# Patient Record
Sex: FEMALE | Race: BLACK OR AFRICAN AMERICAN | NOT HISPANIC OR LATINO | Employment: OTHER | URBAN - METROPOLITAN AREA
[De-identification: names, ages, dates, MRNs, and addresses within clinical notes are randomized per-mention and may not be internally consistent; named-entity substitution may affect disease eponyms.]

---

## 2017-02-24 ENCOUNTER — CLINICAL SUPPORT (OUTPATIENT)
Dept: REHABILITATION | Facility: HOSPITAL | Age: 22
End: 2017-02-24
Attending: NURSE PRACTITIONER
Payer: MEDICAID

## 2017-02-24 DIAGNOSIS — M25.631 WRIST STIFFNESS, RIGHT: ICD-10-CM

## 2017-02-24 DIAGNOSIS — M25.641 STIFFNESS OF RIGHT HAND JOINT: ICD-10-CM

## 2017-02-24 DIAGNOSIS — M25.641 JOINT STIFFNESS OF HAND, RIGHT: ICD-10-CM

## 2017-02-24 DIAGNOSIS — M25.621 ELBOW STIFFNESS, RIGHT: Primary | ICD-10-CM

## 2017-02-24 DIAGNOSIS — M25.621 STIFFNESS OF RIGHT ELBOW JOINT: ICD-10-CM

## 2017-02-24 DIAGNOSIS — M25.631 STIFFNESS OF JOINT OF RIGHT FOREARM: ICD-10-CM

## 2017-02-24 DIAGNOSIS — M25.631 STIFFNESS OF RIGHT WRIST JOINT: ICD-10-CM

## 2017-02-24 PROCEDURE — 97165 OT EVAL LOW COMPLEX 30 MIN: CPT | Mod: PN

## 2017-02-24 NOTE — PLAN OF CARE
TIME RECORD    Date: 02/24/2017    Start Time:  9  Stop Time:  930    PROCEDURES:    TIMED  Procedure Time Min.    Start:  Stop:     Start:  Stop:     Start:  Stop:     Start:  Stop:          UNTIMED  Procedure Time Min.   eval Start:9  Stop:930     Start:  Stop:      Total Timed Minutes:  0  Total Timed Units:  1  Total Untimed Units:  1  Charges Billed/# of units:  1    OUTPATIENT OCCUPATIONAL THERAPY   PATIENT EVALUATION  Onset Date: years ago  Primary Diagnosis: cp with elbow, wrist, and digital flexion deformities corrected via recent  Surgery  Treatment Diagnosis: r elbow, forearm, wrist, and digital stiffness  Past Medical History:   Diagnosis Date    Anticoagulant long-term use     Blood transfusion     Diabetes mellitus     Hydrocephalus     Has 2 shunts    PDA (patent ductus arteriosus) At birth    repaired    Seizures      Precautions: universal  Prior Therapy: pre op tried OT  Medications: Santino Pompa has a current medication list which includes the following prescription(s): adalimumab, baclofen, lactulose, and levetiracetam.  Nutrition:  Normal  History of Present Illness: progressively worsening joint contractures, tried nonop tx, recently had surgery  Prior Level of Function: no functional use r ue  Social History: no concernes  DME: thermoplast splints to reinforce benefit of surgery noted  Functional Deficits Leading to Referral/Nature of Injury: concern for hygiene, permanent contractures, etc.  Patient/caregiver Therapy Goals: prevent joint contracture return    Subjective     Santino Pompa's caregiver states understanding home stretching is what OT should focus on at this point.        Objective     Posture: healed incisions noted from surgery  Range of Motion:    Prom ext/flex 0/140, sup/pro 10/90 wrist df/pf 20/60, rd/ud 20/50, thumb wnl, digits 2 thru 5 all about 2 cm to dpc with composite flex      Edema: some mild pitting edema noted r hand  ADL: dependent with all  required tasks  Treatment: told to wear splints mainly when sleeping and if arm starts to assume pre op posture, issued scar massage, issued stretches for elbow flex/ext, sup, wrist df/pf, if thru sf composite flex    Assessment       Initial Assessment (Pertinent finding, problem list and factors affecting outcome): 1-2 more visits indicated to upgrade HEP as needed to maximize joint flexibility long term  Rehab Potiential: good    Goal: caregiver will be I with HEP    Plan     Certification Period: 2-24-17 to 4-21-17  Recommended Treatment Plan: 3 times per week for 8 weeks: per md rx  Other Recommendations: home stretches should be focus of care based on surgery type, current flexibility of r ue, and prior level of function and status of r ue      Therapist: Deangelo Sanchez, EZEQUIEL/CHT    I CERTIFY THE NEED FOR THESE SERVICES FURNISHED UNDER THIS PLAN OF TREATMENT AND WHILE UNDER MY CARE    Physician's comments: ________________________________________________________________________________________________________________________________________________      Physician's Name: ___________________________________

## 2017-03-24 ENCOUNTER — CLINICAL SUPPORT (OUTPATIENT)
Dept: REHABILITATION | Facility: HOSPITAL | Age: 22
End: 2017-03-24
Attending: NURSE PRACTITIONER
Payer: MEDICAID

## 2017-03-24 DIAGNOSIS — M25.621 ELBOW STIFFNESS, RIGHT: Primary | ICD-10-CM

## 2017-03-24 DIAGNOSIS — M25.641 JOINT STIFFNESS OF HAND, RIGHT: ICD-10-CM

## 2017-03-24 DIAGNOSIS — M25.631 STIFFNESS OF RIGHT WRIST JOINT: ICD-10-CM

## 2017-03-24 DIAGNOSIS — M25.631 WRIST STIFFNESS, RIGHT: ICD-10-CM

## 2017-03-24 DIAGNOSIS — M25.621 STIFFNESS OF RIGHT ELBOW JOINT: ICD-10-CM

## 2017-03-24 DIAGNOSIS — M25.641 STIFFNESS OF RIGHT HAND JOINT: ICD-10-CM

## 2017-03-24 DIAGNOSIS — M25.631 STIFFNESS OF JOINT OF RIGHT FOREARM: ICD-10-CM

## 2017-03-24 PROCEDURE — 97110 THERAPEUTIC EXERCISES: CPT | Mod: PN

## 2017-03-24 NOTE — PROGRESS NOTES
Time in 915  Time out 945      Timed units  2 therex                              Time:915-945        S:understand erika torres OT will continue care in future  Pain: none with stretches noted    O:  Stretches as tolerated-pain free elbow thru hand all planes    Issued xs edema glove to be worn during waking hours due to mod pitting dorsal hand edema    Told pt. Family members orthotic may be leading to supination and wrist df stiffness and that continued use to offset potential stiffness resolved by surgery may no longer be therapeutic            A:stretching, edema control, scar control should be focus of care            P:per erika torres OT

## 2017-03-28 ENCOUNTER — CLINICAL SUPPORT (OUTPATIENT)
Dept: REHABILITATION | Facility: HOSPITAL | Age: 22
End: 2017-03-28
Attending: NURSE PRACTITIONER
Payer: MEDICAID

## 2017-03-28 DIAGNOSIS — M25.621 ELBOW STIFFNESS, RIGHT: Primary | ICD-10-CM

## 2017-03-28 DIAGNOSIS — M25.631 STIFFNESS OF JOINT OF RIGHT FOREARM: ICD-10-CM

## 2017-03-28 DIAGNOSIS — M25.631 WRIST STIFFNESS, RIGHT: ICD-10-CM

## 2017-03-28 PROCEDURE — 97760 ORTHOTIC MGMT&TRAING 1ST ENC: CPT | Mod: PN

## 2017-03-28 PROCEDURE — 97140 MANUAL THERAPY 1/> REGIONS: CPT | Mod: PN

## 2017-03-28 PROCEDURE — 97110 THERAPEUTIC EXERCISES: CPT | Mod: PN

## 2017-03-28 NOTE — PROGRESS NOTES
Occupational Therapy    Date:  03/28/2017  Start Time:  0910  Stop Time:  1010    TIMED  Procedure Time Min.   Manual (1) Start:  Stop: 20   Ortho Fit/Train (2) Start:  Stop: 30   TherEx (1) Start:  Stop: 10   Total Timed Minutes:  60  Total Timed Units:  4  Total Untimed Units:  0  Charges Billed/# of units:  4    Progress/Current Status    Subjective:     Patient ID: Santino Pompa is a 22 y.o. female.  Diagnosis:   1. Elbow stiffness, right     2. Stiffness of joint of right forearm     3. Wrist stiffness, right       Pain: Pt unable to rate pain.  Mom and caregiver report that stretches and scar management seems to be going well. Pt did not give indication of pain during stretches.    Objective:     Scar mobs to both scars in stretch positions. Reviewed with mom and caregiver reasoning of scar mobs and how much pressure for them to apply, direction of force at home. They v/u.  Stretching wrist/hand/digits to extension.  Resting orthosis is cracked at the wrist. Fabricated new resting orthosis with increased wrist extension compared to the old one w/ digits in neutral.  Fit appeared good with edema glove in place.    Assessment:     Pt still w/ full elbow extension and tolerating increasing in extension of resting orthosis. Santino can continue to benefit from skilled OT to further improve her wrist ROM for increased R UE function.    Patient Education/Response:     As above.    Plans and Goals:     Consider KT for scar mgt.    JACK Castillo, CLARICE  3/28/2017

## 2017-03-30 ENCOUNTER — CLINICAL SUPPORT (OUTPATIENT)
Dept: REHABILITATION | Facility: HOSPITAL | Age: 22
End: 2017-03-30
Attending: NURSE PRACTITIONER
Payer: MEDICAID

## 2017-03-30 DIAGNOSIS — M25.631 WRIST STIFFNESS, RIGHT: ICD-10-CM

## 2017-03-30 DIAGNOSIS — M25.631 STIFFNESS OF JOINT OF RIGHT FOREARM: ICD-10-CM

## 2017-03-30 DIAGNOSIS — M25.621 ELBOW STIFFNESS, RIGHT: Primary | ICD-10-CM

## 2017-03-30 PROCEDURE — 97140 MANUAL THERAPY 1/> REGIONS: CPT | Mod: PN

## 2017-03-30 PROCEDURE — 97110 THERAPEUTIC EXERCISES: CPT | Mod: PN

## 2017-03-31 ENCOUNTER — CLINICAL SUPPORT (OUTPATIENT)
Dept: REHABILITATION | Facility: HOSPITAL | Age: 22
End: 2017-03-31
Attending: NURSE PRACTITIONER
Payer: MEDICAID

## 2017-03-31 DIAGNOSIS — M25.621 ELBOW STIFFNESS, RIGHT: Primary | ICD-10-CM

## 2017-03-31 DIAGNOSIS — M25.631 STIFFNESS OF JOINT OF RIGHT FOREARM: ICD-10-CM

## 2017-03-31 DIAGNOSIS — M25.631 WRIST STIFFNESS, RIGHT: ICD-10-CM

## 2017-03-31 PROCEDURE — 97760 ORTHOTIC MGMT&TRAING 1ST ENC: CPT | Mod: PN

## 2017-03-31 PROCEDURE — 97110 THERAPEUTIC EXERCISES: CPT | Mod: PN

## 2017-04-01 NOTE — PROGRESS NOTES
Occupational Therapy    Date: 3/31/2017  Start Time:  1310  Stop Time:  1400    TIMED  Procedure Time Min.   Ortho Fit/Train (1) Start:  Stop: 20   TherEx (2) Start:  Stop: 30   Total Timed Minutes:  50  Total Timed Units:  3  Total Untimed Units:  0  Charges Billed/# of units:  3    Progress/Current Status    Subjective:     Patient ID: Santino Pompa is a 22 y.o. female.  Diagnosis:   1. Elbow stiffness, right     2. Stiffness of joint of right forearm     3. Wrist stiffness, right       Pain: 0 /10 at rest. Pt agreeable to stretching. She denies irritation from KT over scars.    Objective:     Pt presents in edema glove, resting splint and supination strap, pulling thumb MCP into hyperextension. However, even after removal of supination strap, MCP appears hyperextended in splint.  Removed supination strap.  KT appears in good condition so left in place.  Stretching to elbow extension, supination and wrist/hand/digit extension.  Wrist extension w/ digits in neutral=30*.  Attempted to adjust splint to modify thumb position and decrease hyperextension of MCP. However, due to stretching of material at wrist and difficulty with stretching thumb, cut out thumb (which is loose and demo's no tightness, increased extension of wrist, reinforced wrist with 2nd layer and added foam to thenar region.  Did not replace supination strap.    Assessment:     KT appears to be well tolerated over scarring, will continue to monitor - and pt w/ increased ROM in wrist extension since previous visit.  Difficulty with progressive adjustments to wrist splint due to inconsistent stretching of splint material.  Santino can continue to benefit from skilled OT for management of scar tissue & stretching of elbow, forearm and wrist tightness to decrease future medical costs.    Patient Education/Response:     Continue with stretching. Caregiver v/u.    Plans and Goals:     Continue stretching and scar management.    Arlnee Mak, MOT,  LOTR  3/31/2017

## 2017-04-01 NOTE — PROGRESS NOTES
"Occupational Therapy    Date: 3/30/17  Start Time:  1020  Stop Time:  1105    TIMED  Procedure Time Min.   Manual (1) Start:  Stop: 20   TherEx (2) Start:  Stop: 25   Total Timed Minutes:  45  Total Timed Units:  3  Total Untimed Units:  0  Charges Billed/# of units:  3    Progress/Current Status    Subjective:     Patient ID: Santino Pompa is a 22 y.o. female.  Diagnosis:   1. Elbow stiffness, right     2. Stiffness of joint of right forearm     3. Wrist stiffness, right       Pain: Pt unable to rate pain. She does indicate pain w/ end-range stretching.    Objective:     Stretching modulated to keep pt below pain threshold.    Applied KT to hypertrophic elbow and wrist scars. On elbow, main strip applied in "S" pattern to follow scar w/ 25-50% tension. Five kassie-cross strips applied at pitting scar areas. On wrist, main strip applied on scar w/ ~50% tension w/ 3 cross-cross sets applied evenly spaced over scar.     Stretching wrist/hand/digits to extension. Stretching forearm to supination. Stretching elbow to extension. During stretches, also perf'd min gliding of radial head to encourage increased supination. Also attempted gliding of distal ends of radius and ulna with decreased movement noted.    Caregiver brought supination splint (neoprene supination strap) that had been previously purchased. This was placed on pt with edema glove at end of session. Slight improvement in resting position (more supinated) while wearing neoprene supination strap.    Assessment:     Wrist extension w/ digits in neutral to 20*. Pt tolerated initial application of KT for scars well. She can continue to benefit from OT to maximize ROM in the R UE.    Patient Education/Response:     Ed caregiver re: wear/care of KT and resume supination strap for "2 hours at a time"    Plans and Goals:     Adjust resting splint as necessary.    JACK Castillo, FÉLIXR  3/30/17    "

## 2017-04-04 ENCOUNTER — CLINICAL SUPPORT (OUTPATIENT)
Dept: REHABILITATION | Facility: HOSPITAL | Age: 22
End: 2017-04-04
Attending: NURSE PRACTITIONER
Payer: MEDICAID

## 2017-04-04 DIAGNOSIS — Z78.9 IMPAIRED MOBILITY AND ADLS: ICD-10-CM

## 2017-04-04 DIAGNOSIS — M25.631 STIFFNESS OF JOINT OF RIGHT FOREARM: ICD-10-CM

## 2017-04-04 DIAGNOSIS — M25.621 ELBOW STIFFNESS, RIGHT: Primary | ICD-10-CM

## 2017-04-04 DIAGNOSIS — M25.631 WRIST STIFFNESS, RIGHT: ICD-10-CM

## 2017-04-04 DIAGNOSIS — Z74.09 IMPAIRED MOBILITY AND ADLS: ICD-10-CM

## 2017-04-04 PROCEDURE — 97140 MANUAL THERAPY 1/> REGIONS: CPT | Mod: PN

## 2017-04-04 PROCEDURE — 97110 THERAPEUTIC EXERCISES: CPT | Mod: PN

## 2017-04-05 ENCOUNTER — CLINICAL SUPPORT (OUTPATIENT)
Dept: REHABILITATION | Facility: HOSPITAL | Age: 22
End: 2017-04-05
Attending: NURSE PRACTITIONER
Payer: MEDICAID

## 2017-04-05 DIAGNOSIS — M25.621 ELBOW STIFFNESS, RIGHT: Primary | ICD-10-CM

## 2017-04-05 DIAGNOSIS — Z74.09 IMPAIRED MOBILITY AND ADLS: ICD-10-CM

## 2017-04-05 DIAGNOSIS — M25.631 WRIST STIFFNESS, RIGHT: ICD-10-CM

## 2017-04-05 DIAGNOSIS — M25.631 STIFFNESS OF JOINT OF RIGHT FOREARM: ICD-10-CM

## 2017-04-05 DIAGNOSIS — Z78.9 IMPAIRED MOBILITY AND ADLS: ICD-10-CM

## 2017-04-05 PROCEDURE — 97140 MANUAL THERAPY 1/> REGIONS: CPT | Mod: PN

## 2017-04-05 PROCEDURE — 97530 THERAPEUTIC ACTIVITIES: CPT | Mod: PN

## 2017-04-05 PROCEDURE — 97110 THERAPEUTIC EXERCISES: CPT | Mod: PN

## 2017-04-05 NOTE — PROGRESS NOTES
Occupational Therapy    Date:  4/4/2017  Start Time:  1010   Stop Time:  1100    TIMED  Procedure Time Min.   Manual (1) Start:  Stop: 20   TherEx (2) Start:  Stop: 30   Total Timed Minutes:  50  Total Timed Units:  3  Total Untimed Units:  0  Charges Billed/# of units:  3    Progress/Current Status    Subjective:     Patient ID: Santino Pompa is a 22 y.o. female.  Diagnosis:   1. Elbow stiffness, right     2. Stiffness of joint of right forearm     3. Wrist stiffness, right     4. Impaired mobility and ADLs       Pain: 0/10 at rest.  Pt uncomfortable when KT removed from scars. Caregiver notes that splint is migrating. She also notes that Santino is no longer able to crawl - she used to stabilize with the R flexed wrist.    Objective:     OT encouraging pt to hug with BOTH arms, which she was able to do with max encouragement at start and end of therapy.  Stretching to elbow extension, supination, wrist/hand/digit extension.  Gentle mobilization of radial head in various forearm positions. Gliding of distal radius/ulna in various aspects of forearm pronation as tolerated.  Flipped supination strap and thumb splint inside out and repositioned, adding proximal hook fastener with improved fit and no hyperextension of thumb MCP. Demo'd to caregiver how to don and photographed with her phone.  Removed KT and perf'd scar mobs. Decreased adhesions noted compared to when KT was placed.    Assessment:     Improved fit of supination strap and thumb splint after turning inside out. Softening of scars compared to when KT was placed.    Patient Education/Response:     As above.    Plans and Goals:     Get Santino on her belly and se how she is doing with crawling.    Arlene Mak, JACK, LOTR  4/4/2017

## 2017-04-05 NOTE — PROGRESS NOTES
Occupational Therapy    Date:  04/05/2017  Start Time:  0905  Stop Time:  1005    TIMED  Procedure Time Min.   Manual (1) Start:  Stop: 15   TherEx (1)  15   TherAct (2) Start:  Stop: 25   Total Timed Minutes:  55  Total Timed Units:  4  Total Untimed Units:  0  Charges Billed/# of units:  4    Progress/Current Status    Subjective:     Patient ID: Santino Pompa is a 22 y.o. female.  Diagnosis:   1. Elbow stiffness, right     2. Stiffness of joint of right forearm     3. Wrist stiffness, right     4. Impaired mobility and ADLs       Pain: 0 /10  Caregiver continues to note that pt can not support herself with the R UE to crawl as she could before tendon release, limiting her ability to participate in play and leisure activities, which are important to both her and her family.    Objective:     Hugs with 2 arms, cues for incorporating the right.  Re-demo'd to caregiver how to don supination strap. Adjusted dorsal hand strap on resting splint and added 1 layer of foam under entire palm to increase extension of wrist - less migration noted after adjustment of strap.  T/f to mat - assisted by caregiver.  Quadruped attempting to crawl - active elbow extension, but no active movement distally to stabilize. If the R UE is supported, she is able to lean on it and reach with the L UE. Had pediatric PT observe pt in crawling, who recommends a custom orthotic specifically for crawling that will stablize the wrist, but will also have a lift to position pt as she is not able to tolerate 90* wrist extension (typical positioning for crawling).  Total A for diaper change by caregiver.  Stretching elbow to extension, forearm to supination, wrist/hand/digits to extension.  KT for scars with multiple strips of directional pull to decrease adhesions at elbow and wrist, no greater than 50% tension in any strip.    Assessment:     Pt w/ slowly improving wrist extension and tolerating increased foam added to splint to keep wrist  in slightly greater extension. Santino would benefit from a wrist support to allow her to engage in crawling so that she can participate in play and leisure activities as she did prior to her tendon release.    Patient Education/Response:     Continue stretching. Reviewed KT wear/care.    Plans and Goals:     Consider orthosis specifically for crawling to support Santino's wrist so that she can stabilize in prone or quadruped.    Arlene Mak, MOT, LOTR  4/5/2017

## 2017-04-12 ENCOUNTER — CLINICAL SUPPORT (OUTPATIENT)
Dept: REHABILITATION | Facility: HOSPITAL | Age: 22
End: 2017-04-12
Attending: NURSE PRACTITIONER
Payer: MEDICAID

## 2017-04-12 DIAGNOSIS — Z74.09 IMPAIRED MOBILITY AND ADLS: ICD-10-CM

## 2017-04-12 DIAGNOSIS — M25.621 ELBOW STIFFNESS, RIGHT: Primary | ICD-10-CM

## 2017-04-12 DIAGNOSIS — Z78.9 IMPAIRED MOBILITY AND ADLS: ICD-10-CM

## 2017-04-12 DIAGNOSIS — M25.631 WRIST STIFFNESS, RIGHT: ICD-10-CM

## 2017-04-12 DIAGNOSIS — M25.631 STIFFNESS OF JOINT OF RIGHT FOREARM: ICD-10-CM

## 2017-04-12 PROCEDURE — 97530 THERAPEUTIC ACTIVITIES: CPT | Mod: PN

## 2017-04-12 PROCEDURE — 97140 MANUAL THERAPY 1/> REGIONS: CPT | Mod: PN

## 2017-04-12 PROCEDURE — 97110 THERAPEUTIC EXERCISES: CPT | Mod: PN

## 2017-04-12 NOTE — PROGRESS NOTES
"Occupational Therapy  Date:  04/12/2017    Start Time:  1000  Stop Time:  1100    TIMED  Procedure Time Min.   TherAct (1) Start:  Stop: 20   TherEx (2) Start:  Stop: 25   Manual (1) Start:  Stop: 15   Total Timed Minutes:  60  Total Timed Units:  4  Total Untimed Units:  0  Charges Billed/# of units:  4    Progress/Current Status    Subjective:     Patient ID: Santino Pompa is a 22 y.o. female.  Diagnosis:   1. Elbow stiffness, right     2. Stiffness of joint of right forearm     3. Wrist stiffness, right     4. Impaired mobility and ADLs       Pain: 0 /10  Caregiver noted that resting splint is not so much "slipping" or migrating as much as Santino is pulling it off when she gets angry.    Objective:       Santino arrives with splint, edema glove and supination strap, clearly not in correct position. Clarified with caregiver as noted in subjective.  Hugs with max encouragement for use of both arms.  Crawling with resting splint, edema glove and supination strap; crawling with edema glove and supination strap only; crawling with no support; crawling with OTC wrist cock-up brace; crawling with OTC wrist cock-up brace and supination strap. Santino used the R UE the most, demo'd the most weight bearing during use of the resting splint. With no brace, she was unable to stabilize. With the wrist cock-up, she appeared uncomfortable, as her MCP's were hyperextended and she appeared to be avoiding weightbearing in that position.  Stretching elbow to extension, forearm to supination and wrist/hand/digits to extension.  Gentle joint manipulations and soft tissue manipulations to encouraged increased supination and wrist extension - including movement of radial head, gliding of distal ends of radius and ulna and gliding of carpal joints.  Fascial tightness noted at volar wrist. KT to volar wrist for space correction  - about 30% tension over scar and about 30% tension across distal wrist to decrease tension in tissues at " wrist to allow for increased extension. Pt placed in max possible wrist extension during application of KT.  Issued dycem for resting splint and ed caregiver to use tape to apply dycem to heel of palm when pt is crawling to prevent slippage. She v/u.    Assessment:     Pt actually most effective crawling with resting hand splint with wrist positioned in about 30* extension and digits in neutral. Recommended to caregiver that Ambry be allowed to crawl in this splint and gave them some dycem to prevent slippage, for now. Will consider fabrication of a sturdier device that pt can crawl in that will hold up better to regular wear/tear of weightbearing.    Patient Education/Response:     As above.    Plans and Goals:     Consider sturdier orthosis specifically for crawling. Continue stretching.    Arlene Mak, JACK, LOTR  4/12/2017

## 2017-04-19 ENCOUNTER — CLINICAL SUPPORT (OUTPATIENT)
Dept: REHABILITATION | Facility: HOSPITAL | Age: 22
End: 2017-04-19
Attending: NURSE PRACTITIONER
Payer: MEDICAID

## 2017-04-19 DIAGNOSIS — Z78.9 IMPAIRED MOBILITY AND ADLS: ICD-10-CM

## 2017-04-19 DIAGNOSIS — M25.631 STIFFNESS OF JOINT OF RIGHT FOREARM: ICD-10-CM

## 2017-04-19 DIAGNOSIS — M25.621 ELBOW STIFFNESS, RIGHT: Primary | ICD-10-CM

## 2017-04-19 DIAGNOSIS — Z74.09 IMPAIRED MOBILITY AND ADLS: ICD-10-CM

## 2017-04-19 DIAGNOSIS — R68.89 DIFFICULTY PARTICIPATING IN LEISURE ACTIVITIES: ICD-10-CM

## 2017-04-19 DIAGNOSIS — M25.631 WRIST STIFFNESS, RIGHT: ICD-10-CM

## 2017-04-19 PROCEDURE — 97760 ORTHOTIC MGMT&TRAING 1ST ENC: CPT | Mod: PN

## 2017-04-19 PROCEDURE — 97110 THERAPEUTIC EXERCISES: CPT | Mod: PN

## 2017-04-20 ENCOUNTER — CLINICAL SUPPORT (OUTPATIENT)
Dept: REHABILITATION | Facility: HOSPITAL | Age: 22
End: 2017-04-20
Attending: NURSE PRACTITIONER
Payer: MEDICAID

## 2017-04-20 DIAGNOSIS — R68.89 DIFFICULTY PARTICIPATING IN LEISURE ACTIVITIES: ICD-10-CM

## 2017-04-20 DIAGNOSIS — M25.631 STIFFNESS OF JOINT OF RIGHT FOREARM: ICD-10-CM

## 2017-04-20 DIAGNOSIS — Z78.9 IMPAIRED MOBILITY AND ADLS: ICD-10-CM

## 2017-04-20 DIAGNOSIS — Z74.09 IMPAIRED MOBILITY AND ADLS: ICD-10-CM

## 2017-04-20 DIAGNOSIS — M25.621 ELBOW STIFFNESS, RIGHT: Primary | ICD-10-CM

## 2017-04-20 DIAGNOSIS — M25.641 JOINT STIFFNESS OF HAND, RIGHT: ICD-10-CM

## 2017-04-20 DIAGNOSIS — M25.631 WRIST STIFFNESS, RIGHT: ICD-10-CM

## 2017-04-20 PROCEDURE — 97110 THERAPEUTIC EXERCISES: CPT | Mod: PN

## 2017-04-20 PROCEDURE — 97760 ORTHOTIC MGMT&TRAING 1ST ENC: CPT | Mod: PN

## 2017-04-20 NOTE — PROGRESS NOTES
Occupational Therapy    Date: 4/19/2017  Start Time:  1005  Stop Time:  1105    TIMED  Procedure Time Min.   Ortho Fit/Train (2) Start:  Stop: 25   TherEx (2) Start:  Stop: 35   Total Timed Minutes:  60  Total Timed Units:  4  Total Untimed Units:  0  Charges Billed/# of units:  4    OCCUPATIONAL THERAPY UPDATED PLAN OF TREATMENT    Patient name: Santino Pompa  Onset Date:  February 2017  SOC Date:  2/24/2017  Primary Diagnosis:  CP  Treatment Diagnosis:    Encounter Diagnoses   Name Primary?    Elbow stiffness, right Yes    Stiffness of joint of right forearm     Wrist stiffness, right     Impaired mobility and ADLs     Difficulty participating in leisure activities      Certification Period:  4/21/2017 to 6/16/2017  Precautions:  universal  Visits from SOC:  9  Functional Level Prior to SOC:  Santino requires max to total assist for most activities (>90% of activities). After starting OT (around visit 5), her caregiver notified OT that Santino has not been able to crawl since the tendon release because she has no control of the R arm. Prior to her tendon release, crawling on the floor was an enjoyed activity where Santino could play ball and interact with her family in a playful way.    Updated Assessment:  Santino remains max to total A for most tasks. She has active shoulder flexion, abduction, adduction, horizontal abduction, and horizontal adduction in the R UE. When cued, she will incorporate the R UE when hugging another person. She also has active elbow extension. Santino does not appear to have purposeful elbow flexion at this time. Nor does she appear to have purposeful movement in the forearm, wrist or hand.    We have tried crawling with and without various supports. With no support, she is unable to bear weight through the R UE as she is unable to stabilize the wrist and forearm. With an OTC wrist cock-up, she is slightly more independent, but resists bearing weight as her MCP's are placed in a  position of forced hyperextension which appears uncomfortable for her. With her resting hand splint, she she bears more weight, but the design of the splint is not sufficient to hold up to repeated weight bearing and repeated use of the splint in this fashion would be detrimental to it.    PROM: elbow ext=-10, flex=122   Forearm pronation >90, supination=30* from full pronation (which is an improvement, but still a significant deficit in movement)   Wrist extension=45, flexion=60   RD=24, UD=50   Thumb movement is grossly WFL   Digits 2-5 composite flexion all get to within 1 cm of dpc    There is continued mild edema in the R UE despite daily use of an appropriately sized glove.  Scars at the elbow and wrist are well healed though min adhesions remain with some keloid/ raised scarring.    Treatment: We started fabrication of a custom orthosis which Santino can use soley for crawling. It positions her at about 45* wrist extension and is designed to prevent over-pronation of the forearm, with reinforcement at the wrist to help the orthosis maintain it's angle with repeated use. It is also designed to allow contact between her finger tips/ thumb and the floor for increased sensory feedback. It will be well padded and allow for concurrent use of the supination strap and edema glove.    Previous Short Term Goals Status: caregiver will be I with HEP (MET)    New Short Term Goals (4 weeks - 4/21/2017- 5/19/2017)  1) Family/caregiver to be mod I for strengthening HEP (for triceps - and any other muscles as appropriate pending progress).  2) continued independence with HEP for ROM and scar management.    New Long Term Goal Status: (8 weeks - 4/21/2017-6/17/2017)  1) While wearing orthosis for crawling, Santino will be able to bear full weight through the R UE for crawling and reaching with the L UE in quadruped to increase engagement in leisure activities for increased quality of life and engagement in prior roles.  2) Santino's  PROM in supination will improve to at least neutral for improvement movement in the R UE.    Reasons for Recertification of Therapy:  Santino's range of motion has improved since starting OT though she continue to have scar adhesions and significant limitations in supination. Most importatly, she still can not crawl, which affects Santino's quality of life and her ability to participate in previously enjoyed leisure activities. She can benefit from continued occupational therapy for orthotic fabrication for a device that will support her wrist and forearm specifically for crawling, final adjustments of her resting orthoses, continued manual therapy and stretching to further improve her ROM and strengthening to allow Santino to participate in the leisure activities she enjoyed prior to her tendon release.     Recommended Treatment Plan: Therapeutic Exercise, Functional Activities, Patient/family Education, Home Exercise Program, orthotic fit/train     Therapist's Name: JACK Castillo LOTR        Date: 4/19/2017      I CERTIFY THE NEED FOR THESE SERVICES FURNISHED UNDER THIS PLAN OF TREATMENT AND WHILE UNDER MY CARE    Physician's comments: ________________________________________________________________________________________________________________________________________________      Physician's Name (print): ___________________________________    Physician's Signature: _______________________________________________    Date: ________________________

## 2017-04-20 NOTE — PROGRESS NOTES
Occupational Therapy    Date:  04/20/2017  Start Time:  1015  Stop Time:  1100    TIMED  Procedure Time Min.   Ortho Fit/Train (2) Start:  Stop: 35   TherEx (1) Start:  Stop: 10   Total Timed Minutes:  45  Total Timed Units:  3  Total Untimed Units:  0  Charges Billed/# of units:  3    Progress/Current Status    Subjective:     Patient ID: Santino Pompa is a 22 y.o. female.  Diagnosis:   1. Elbow stiffness, right     2. Stiffness of joint of right forearm     3. Wrist stiffness, right     4. Impaired mobility and ADLs     5. Difficulty participating in leisure activities     6. Joint stiffness of hand, right       Pain: 0 /10  Caregiver/mom report that Santino did not try crawling since previous visit.    Objective:     Completed fabrication of custom orthosis for crawling. Added padding at palm and moleskin over inside of orthosis to ensure no irritation to skin.  Added strapping to forearm with d-rings for adequate ability to tighten straps and demo'd to mom and caregiver how to tighten straps without over tightening.  Added strap over hand. Issued dycem and ed mom to [employer] glue one piece to bottom of orthosis to prevent slippage.    Stretching to wrist/hand/digits to extension.    Santino demo'd ability to crawl in orthosis, though with min weight bearing. When she was encouraged to increase WB through the R UE and reach with the L UE, she shifted her weight posteriorly to her thighs.     Assessment:     Orthosis fit appears good. Santino stays in pronation during crawling (as opposed to over pronating). She is able to bear some weight and activate her triceps in quadruped with the orthosis on her hand. She can continue to benefit from skilled OT to improve R UE ROM and function for return to prior level of function and return to leisure activities like crawling with orthotic assist.    Patient Education/Response:     Work on crawling with orthosis. Continue stretching and scar management HEP. Mom and caregiver  v/u.    Plans and Goals:     Continue strengthening triceps and working on crawling.    Arlene Mak, MOT, LOTR  4/20/2017

## 2017-05-09 ENCOUNTER — CLINICAL SUPPORT (OUTPATIENT)
Dept: REHABILITATION | Facility: HOSPITAL | Age: 22
End: 2017-05-09
Payer: MEDICAID

## 2017-05-09 DIAGNOSIS — R68.89 DIFFICULTY PARTICIPATING IN LEISURE ACTIVITIES: ICD-10-CM

## 2017-05-09 DIAGNOSIS — Z78.9 IMPAIRED MOBILITY AND ADLS: ICD-10-CM

## 2017-05-09 DIAGNOSIS — Z74.09 IMPAIRED MOBILITY AND ADLS: ICD-10-CM

## 2017-05-09 DIAGNOSIS — M25.631 STIFFNESS OF JOINT OF RIGHT FOREARM: ICD-10-CM

## 2017-05-09 DIAGNOSIS — M25.621 ELBOW STIFFNESS, RIGHT: Primary | ICD-10-CM

## 2017-05-09 DIAGNOSIS — M25.631 WRIST STIFFNESS, RIGHT: ICD-10-CM

## 2017-05-09 PROCEDURE — 97140 MANUAL THERAPY 1/> REGIONS: CPT | Mod: PN

## 2017-05-09 PROCEDURE — 97110 THERAPEUTIC EXERCISES: CPT | Mod: PN

## 2017-05-09 PROCEDURE — 97530 THERAPEUTIC ACTIVITIES: CPT | Mod: PN

## 2017-05-11 ENCOUNTER — CLINICAL SUPPORT (OUTPATIENT)
Dept: REHABILITATION | Facility: HOSPITAL | Age: 22
End: 2017-05-11
Payer: MEDICAID

## 2017-05-11 DIAGNOSIS — R68.89 DIFFICULTY PARTICIPATING IN LEISURE ACTIVITIES: ICD-10-CM

## 2017-05-11 DIAGNOSIS — Z74.09 IMPAIRED MOBILITY AND ADLS: ICD-10-CM

## 2017-05-11 DIAGNOSIS — Z78.9 IMPAIRED MOBILITY AND ADLS: ICD-10-CM

## 2017-05-11 DIAGNOSIS — M25.631 WRIST STIFFNESS, RIGHT: ICD-10-CM

## 2017-05-11 DIAGNOSIS — M25.631 STIFFNESS OF JOINT OF RIGHT FOREARM: ICD-10-CM

## 2017-05-11 DIAGNOSIS — M25.621 ELBOW STIFFNESS, RIGHT: Primary | ICD-10-CM

## 2017-05-11 PROCEDURE — 97140 MANUAL THERAPY 1/> REGIONS: CPT | Mod: PN

## 2017-05-11 PROCEDURE — 97530 THERAPEUTIC ACTIVITIES: CPT | Mod: PN

## 2017-05-11 PROCEDURE — 97110 THERAPEUTIC EXERCISES: CPT | Mod: PN

## 2017-05-11 NOTE — PROGRESS NOTES
Occupational therapy    Date:  05/11/2017  Start Time:  0910  Stop Time:  1005    TIMED  Procedure Time Min.   TherEx (2) Start:  Stop: 25   TherAct (1) Start:  Stop: 10   Manual (1) Start:  Stop: 20   Total Timed Minutes:  55  Total Timed Units:  4  Total Untimed Units:  0  Charges Billed/# of units:  4    Progress/Current Status    Subjective:     Patient ID: Santino Pompa is a 22 y.o. female.  Diagnosis:   1. Elbow stiffness, right     2. Stiffness of joint of right forearm     3. Wrist stiffness, right     4. Impaired mobility and ADLs     5. Difficulty participating in leisure activities       Pain: 0 /10  Mom expressed concern that Ronnys fingers are pulling back toward extension, especially at the tips.    Objective:     Pt arrives with mom and caregiver today. Edema glove and supination strap in place, splint migrating distally.  Mod A for SPT to the mat. Sitting at EOM, perf'd mobilization of carpals, metacarpals, and forearm to improve wrist extension, supination, and position of thumb CMC in preparation for WB. PROM following mobilization.   PROM of fingers into composite fist - as pt withdrawing from this, combined the movement of her withdrawal to make a rotational movement that allowed for repeated ROM that pt actually engaged in. Ed mom and caregiver that they should attempt complete digit ROM with this technique at home and may be more successful. They v/u.  WB through the R UE w/ carpal massage to improve positioning of wrist in WB position during L UE contralateral reaching engage in game. Pt unable to strategize during play, but did enjoy interaction with the game. Added padding to thenar eminence of splint to reduce over-pronation and pull CMC toward more correct postioning. Repeated ROM of digits. Replaced edema glove, supination strap and resting splint at end of session.    Assessment:     Improved tolerance for WB through the R UE compared to earlier this week. Improved tolerance for  ROM to digit flexion as well with rotational movements where pt is engaged in whole arm movement during digit ROM. Ambry can continue to benefit from skilled OT to increase ROM and WB tolerance throughout the R UE for increased ability to participate in desired leisure activities.    Patient Education/Response:     As noted above.    Plans and Goals:     Attempt WB in quadruped for game or floor puzzle. Continue manual and ROM. Progress splint as tolerated to improve supination and thumb CMC postioning.    Arlene Mak, MOT, LOTR  5/11/2017

## 2017-05-16 ENCOUNTER — CLINICAL SUPPORT (OUTPATIENT)
Dept: REHABILITATION | Facility: HOSPITAL | Age: 22
End: 2017-05-16
Payer: MEDICAID

## 2017-05-16 DIAGNOSIS — Z74.09 IMPAIRED MOBILITY AND ADLS: ICD-10-CM

## 2017-05-16 DIAGNOSIS — M25.641 JOINT STIFFNESS OF HAND, RIGHT: ICD-10-CM

## 2017-05-16 DIAGNOSIS — Z78.9 IMPAIRED MOBILITY AND ADLS: ICD-10-CM

## 2017-05-16 DIAGNOSIS — M25.631 STIFFNESS OF JOINT OF RIGHT FOREARM: ICD-10-CM

## 2017-05-16 DIAGNOSIS — M25.631 WRIST STIFFNESS, RIGHT: ICD-10-CM

## 2017-05-16 DIAGNOSIS — R68.89 DIFFICULTY PARTICIPATING IN LEISURE ACTIVITIES: ICD-10-CM

## 2017-05-16 DIAGNOSIS — M25.621 ELBOW STIFFNESS, RIGHT: Primary | ICD-10-CM

## 2017-05-16 PROCEDURE — 97110 THERAPEUTIC EXERCISES: CPT | Mod: PN

## 2017-05-16 PROCEDURE — 97530 THERAPEUTIC ACTIVITIES: CPT | Mod: PN

## 2017-05-16 PROCEDURE — 97140 MANUAL THERAPY 1/> REGIONS: CPT | Mod: PN

## 2017-05-16 NOTE — PROGRESS NOTES
"Occupational Therapy    Date:  05/16/2017  Start Time: 10:05  Stop Time:  11:03    TIMED  Procedure Time Min.   Manual (1) Start:  Stop: 20   Therapeutic activity (1)  Start:  Stop: 10   TherEx (2) Start:  Stop: 28   Total Timed Minutes:  58  Total Timed Units:  4  Total Untimed Units: 0  Charges Billed/# of units:  4    Progress/Current Status    Subjective:     Patient ID: Santino Pompa is a 22 y.o. female.  Diagnosis:   1. Elbow stiffness, right     2. Stiffness of joint of right forearm     3. Wrist stiffness, right     4. Impaired mobility and ADLs     5. Difficulty participating in leisure activities     6. Joint stiffness of hand, right       Pain: 0 /10  Pt unable to verbalize pain level. Based on FLACC, 0/10 at tx start/ during rest. Up to 4/10 during PROM of hand and forearm. 0/10 at end of tx.  Pt was in good spirits on arrival. Caretaker noted that she had increased the functional use of R UE w/ orthosis on and the orthosis showed signs of wear.     Objective:     Pt arrived w/ R UEresting hand orthosis, edema glove and supination strap. Caregiver brought the R UE mobility assisting orthosis as well. Mod A t/f to mat w/ max encouragement for "up tall" and progressing feet during t/f. Manual tx for mobilization of PIPs, DIPs, MPs, CMCs, thumb, carpals, radius and ulna to improve ROM and decrease edema.  Facilitated mobilizations through functional reaching activities to engage pt's active participation. PROM to digit flexion for intrinsic (+), intrinsic (-), and fist, as well as thumb composite flexion and thumb IP flexion.    R UE WB during L UE contralateral reaching w/ min support to over pronation of forearm in prep for crawling.    Mod A for sit<>quadruped. Quadruped with mobility orthosis for static WB while engaging in puzzle activity - pt required facilitation for maintaining center of gravity over arms and not resting back on hips while reaching with the L UE. Crawling backwards/forwards " w/ assist to maintain pronated position/ prevent over pronation. As above, facilitation of positioning center of gravity to promoted WB through R UE.     Added 1 layer of foam to both resting splint and mobility orthosis to thenar pad region to improve thumb CMC position/ increase supination in resting position.    R UE was returned to the edema glove, supination strap and resting hand splint at the end of the session.    Assessment:     Pt w/ slight improvement in tolerance for WB with mobility orthosis. Ambry can continue to benefit from skilled OT to increase ROM and WB tolerance throughout the R UE for increased ability to participate in desired leisure activities.    Patient Education/Response:     Ed caregiver re: PROM for thumb and fingers, handout given. She v/u.    Plans and Goals:     Have caregiver/mom return demo of PROM to thumb and fingers to ensure correct technique.   Pt needs to continue with positioning and stretching according to HEP.  Pt needs to continue to wear mobility orthosis when crawling at home.   Consider a AFO quality orthosis to support hand at time of d/c.     JACK Castillo, CLARICE  5/16/2017

## 2017-05-18 ENCOUNTER — CLINICAL SUPPORT (OUTPATIENT)
Dept: REHABILITATION | Facility: HOSPITAL | Age: 22
End: 2017-05-18
Payer: MEDICAID

## 2017-05-18 DIAGNOSIS — R68.89 DIFFICULTY PARTICIPATING IN LEISURE ACTIVITIES: ICD-10-CM

## 2017-05-18 DIAGNOSIS — Z74.09 IMPAIRED MOBILITY AND ADLS: ICD-10-CM

## 2017-05-18 DIAGNOSIS — M25.621 ELBOW STIFFNESS, RIGHT: Primary | ICD-10-CM

## 2017-05-18 DIAGNOSIS — M25.631 WRIST STIFFNESS, RIGHT: ICD-10-CM

## 2017-05-18 DIAGNOSIS — Z78.9 IMPAIRED MOBILITY AND ADLS: ICD-10-CM

## 2017-05-18 DIAGNOSIS — M25.641 JOINT STIFFNESS OF HAND, RIGHT: ICD-10-CM

## 2017-05-18 DIAGNOSIS — M25.631 STIFFNESS OF JOINT OF RIGHT FOREARM: ICD-10-CM

## 2017-05-18 PROCEDURE — 97140 MANUAL THERAPY 1/> REGIONS: CPT | Mod: PN

## 2017-05-18 PROCEDURE — 97530 THERAPEUTIC ACTIVITIES: CPT | Mod: PN

## 2017-05-18 PROCEDURE — 97110 THERAPEUTIC EXERCISES: CPT | Mod: PN

## 2017-05-18 NOTE — PROGRESS NOTES
"Occupational Therapy Treatment Note     Date:  05/18/2017    Start Time:  0905  Stop Time:  1005    PROCEDURES:    TIMED  Procedure Time Min.   TherEx (2)  25   Manual (1) Start:  Stop: 15   TherAct (1) Start:  Stop: 20     Total Timed Minutes:  60  Total Timed Units:  4  Total Untimed Units:  0  Charges Billed/# of units:  4      Progress/Current Status    Subjective:     Patient ID: Santino Pompa is a 22 y.o. female.  Diagnosis:   1. Elbow stiffness, right     2. Stiffness of joint of right forearm     3. Wrist stiffness, right     4. Impaired mobility and ADLs     5. Difficulty participating in leisure activities     6. Joint stiffness of hand, right       Pain: 0 /10  Pt seemed at good spirits on arrival.  Caregiver reported increased edema in the R hand when iRcharry woke this morning. When OT asked pt if wrist/forearm mobilization felt painful or weird, pt reported "weird". During wrist supination pt displayed a 3/10 on FLACC scale.      Objective:     Pt had resting hand splint, supination strap, and edema glove.  Caretaker brought mobility orthosis.   Santino had noticeable increase in edema in the R hand at the start of session vs the beginning of prior session. Edema did improve over course of session.  SPT w/c <> edge of mat Mod A   AROM for R sh flex x5 w/ max encouragement  Attempted elbow flex to touch head - she was able to flex shoulder, but unable to bend elbow 5 reps  PROM 2x10 for composite digit flexion, intrinsic (+), intrinsic (-), thumb IP flex, thumb composite flex/ext  Mobilization of wrist and forearm  WB through palm of R UE w/ support while seated during functional activity with L UE crossing midline. During WB with R UE when crossing midline there was a popping at the head of the radius that decreased when supported. This has been noted by family on previous visits and mentioned to the surgeon.  WB through palm of R UE  in quadruped w/ mobility orthosis, support at R forearm and " proximal to elbow, as well as support to hips during functional task. When attempting WB during L UE function where Ambry was required to lift the L UE, she tried to stabilize by leaning on the L elbow. When that was discouraged by OT, she extended hips and laid in prone over OT's thighs.  SPT edge of mat <> w/c Mod A  Pt escorted out by caretaker in w/c with resting hand splint, supination strap, and edema glove in place.     Assessment:     Pt presented with continued over pronation.  Pt reported that R wrist felt weird when mobilizing.  Pt does not appear to have any active elbow flexion in the R UE. Pt had difficulty coordinating quadruped positioning while WB through R UE and reaching with opposite side.  Therapists provided support at forearm and hip w/ max assist. Pt can continue to benefit from skilled OT services to retrain/rehabilitated R UE in order to engage with valued activities.     Patient Education/Response:     Continue HEP    Plans and Goals:     Continue to stretch and mobilize R UE to allow engagement in leisure activities.      Arlene Mak, JACK, LOTR  5/18/2017

## 2017-05-22 ENCOUNTER — CLINICAL SUPPORT (OUTPATIENT)
Dept: REHABILITATION | Facility: HOSPITAL | Age: 22
End: 2017-05-22
Payer: MEDICAID

## 2017-05-22 DIAGNOSIS — Z74.09 IMPAIRED MOBILITY AND ADLS: ICD-10-CM

## 2017-05-22 DIAGNOSIS — M25.621 ELBOW STIFFNESS, RIGHT: Primary | ICD-10-CM

## 2017-05-22 DIAGNOSIS — M25.641 JOINT STIFFNESS OF HAND, RIGHT: ICD-10-CM

## 2017-05-22 DIAGNOSIS — R68.89 DIFFICULTY PARTICIPATING IN LEISURE ACTIVITIES: ICD-10-CM

## 2017-05-22 DIAGNOSIS — Z78.9 IMPAIRED MOBILITY AND ADLS: ICD-10-CM

## 2017-05-22 DIAGNOSIS — M25.631 STIFFNESS OF JOINT OF RIGHT FOREARM: ICD-10-CM

## 2017-05-22 DIAGNOSIS — M25.631 WRIST STIFFNESS, RIGHT: ICD-10-CM

## 2017-05-22 PROCEDURE — 97530 THERAPEUTIC ACTIVITIES: CPT | Mod: PN

## 2017-05-22 PROCEDURE — 97140 MANUAL THERAPY 1/> REGIONS: CPT | Mod: PN

## 2017-05-22 PROCEDURE — 97110 THERAPEUTIC EXERCISES: CPT | Mod: PN

## 2017-05-22 NOTE — PROGRESS NOTES
Occupational Therapy    Date:  05/22/2017  Start Time:  0905  Stop Time:  1000    TIMED  Procedure Time Min.   TherEx (2) Start:  Stop: 25   Manual (1) Start:  Stop: 20   TherAct (1) Start:  Stop: 10   Total Timed Minutes:  55  Total Timed Units:  4  Total Untimed Units:  0  Charges Billed/# of units:  4    Progress/Current Status    Subjective:     Patient ID: Santino Pompa is a 22 y.o. female.  Diagnosis:   1. Elbow stiffness, right     2. Stiffness of joint of right forearm     3. Wrist stiffness, right     4. Impaired mobility and ADLs     5. Difficulty participating in leisure activities     6. Joint stiffness of hand, right       Pain: 0 /10 at rest. Caregiver commented that she thinks Santino did not have a very good weekend. Santino without complaint.    Objective:     Mod A w/c<>mat SPT.  Sitting EOM, manual tx to wrist, forearm, hand and fingers.  PROM to finger flexion, individual (joint and finger) and composite (joint and finger)  Stretching to supination. Stretching to wrist extension.  KT for scar mobilization at elbow w/ cross-directional pulling, ~35% tension. KT for scar mobilization and space correction at wrist with 2 i-strips in t-pattern, ~35% tension.  WB through the R palm for L UE contralateral reaching as tolerated. Attempted WB through R forearm w/ stretching forearm to neutral supination but pt pulling away.  Repeat PROM to digit flexion  Added foam to thenar region of resting orthosis to improve supination in resting position.    Assessment:     Pt w/o mobilization orthosis today, so crawling deferred. Was able to work on WB at EOM though pt remains resistant to WB overall.    Patient Education/Response:     Encouraged pt and caregiver to continue HEP for stretching, ROM and crawling. Caregiver v/u.    Plans and Goals:     Continue mobs, ROM, WB, crawling    JACK Castillo, LOTR  5/22/2017

## 2017-05-25 ENCOUNTER — CLINICAL SUPPORT (OUTPATIENT)
Dept: REHABILITATION | Facility: HOSPITAL | Age: 22
End: 2017-05-25
Payer: MEDICAID

## 2017-05-25 DIAGNOSIS — Z74.09 IMPAIRED MOBILITY AND ADLS: ICD-10-CM

## 2017-05-25 DIAGNOSIS — M25.631 STIFFNESS OF JOINT OF RIGHT FOREARM: ICD-10-CM

## 2017-05-25 DIAGNOSIS — R68.89 DIFFICULTY PARTICIPATING IN LEISURE ACTIVITIES: ICD-10-CM

## 2017-05-25 DIAGNOSIS — M25.641 JOINT STIFFNESS OF HAND, RIGHT: ICD-10-CM

## 2017-05-25 DIAGNOSIS — M25.621 ELBOW STIFFNESS, RIGHT: Primary | ICD-10-CM

## 2017-05-25 DIAGNOSIS — M25.631 WRIST STIFFNESS, RIGHT: ICD-10-CM

## 2017-05-25 DIAGNOSIS — Z78.9 IMPAIRED MOBILITY AND ADLS: ICD-10-CM

## 2017-05-25 PROCEDURE — 97110 THERAPEUTIC EXERCISES: CPT | Mod: PN

## 2017-05-25 PROCEDURE — 97530 THERAPEUTIC ACTIVITIES: CPT | Mod: PN

## 2017-05-25 NOTE — PROGRESS NOTES
"Occupational Therapy Note     Date:  05/25/2017    Start Time:  0912   Stop Time:  1000    PROCEDURES:    TIMED  Procedure Time Min.   TherAct (1) Start:  Stop: 17   Manual (0)  7   TherEx (2) Start:  Stop: 24   Total Timed Minutes:  48  Total Timed Units: 3  Total Untimed Units:  0  Charges Billed/# of units:  3    Progress/Current Status    Subjective:     Patient ID: Santino Pompa is a 22 y.o. female.  Diagnosis:   1. Elbow stiffness, right     2. Stiffness of joint of right forearm     3. Wrist stiffness, right     4. Impaired mobility and ADLs     5. Difficulty participating in leisure activities     6. Joint stiffness of hand, right       Pain: 0 /10  Pt. Tends to with draw with a FLACC pain score of 3/10 during wrist mobilizations, particularly w/ composite finger and wrist flexion.     Objective:     Pt was met in the lobby w/ brother ([place]) and wheeled to the OT gym.  Pt had resting hand splint, supination strap, and edema glove on at start of session.  SPT t/f Max A w/c<>mat.  Sitting EOM, manual tx to wrist, forearm, hand and fingers to decrease edema and increase mobility.  PROM to finger flexion, individual (joint and finger) and composite (joint and finger). As tolerated, combined finger flexion with wrist flexion.  Stretching to supination. Stretching to wrist extension.  Facilitation of "up tall" posture at EOM.  Supported WB through R palm during L UE contralateral reaching.   Mobility assistance splint donned, supported WB through R UE during L UE contralateral reaching to play [employer] on an adjustable platform. She required mod-max support to triceps despite active demonstrating active movement in triceps today and during prior sessions. Pt unable to apply strategy during game, but was able to reach to top of game board for dropping pieces into board with only occasional cues.  Mobility assistance splint was removed edema glove, supination strap, and resting hand splint were " replaced.     Assessment:     Max A t/f today, more difficult  than the previous sessions - pt w/ decreased initiation during t/f.  OT inquired to brother if pt had quality sleep, but he was unable to answer. Santino can continue to benefit from skilled OT services to improve passive mobility in the wrist, hand and forearm and to improve ability to bear weight through the R UE to allow engagement in crawling for return to valued roles and routines.    Patient Education/Response:     Pt's brother was educated on composite PROM wrist/finger flexion.    Plans and Goals:     Continue with stretching, ROM, mobs and crawling.     Francisco Lin, JACKS  5/25/2017    I certify that I was present in the room directing the student in service delivery and guiding them using my skilled judgment. As the co-signing therapist I have reviewed the students documentation and am responsible for the treatment, assessment, and plan.   Arlene Mak, JACK, LOTR  5/25/2017

## 2017-05-30 ENCOUNTER — CLINICAL SUPPORT (OUTPATIENT)
Dept: REHABILITATION | Facility: HOSPITAL | Age: 22
End: 2017-05-30
Payer: MEDICAID

## 2017-05-30 DIAGNOSIS — Z78.9 IMPAIRED MOBILITY AND ADLS: ICD-10-CM

## 2017-05-30 DIAGNOSIS — M25.641 JOINT STIFFNESS OF HAND, RIGHT: ICD-10-CM

## 2017-05-30 DIAGNOSIS — Z74.09 IMPAIRED MOBILITY AND ADLS: ICD-10-CM

## 2017-05-30 DIAGNOSIS — M25.621 ELBOW STIFFNESS, RIGHT: Primary | ICD-10-CM

## 2017-05-30 DIAGNOSIS — M25.631 STIFFNESS OF JOINT OF RIGHT FOREARM: ICD-10-CM

## 2017-05-30 DIAGNOSIS — R68.89 DIFFICULTY PARTICIPATING IN LEISURE ACTIVITIES: ICD-10-CM

## 2017-05-30 DIAGNOSIS — M25.631 WRIST STIFFNESS, RIGHT: ICD-10-CM

## 2017-05-30 PROCEDURE — 97140 MANUAL THERAPY 1/> REGIONS: CPT | Mod: PN

## 2017-05-30 PROCEDURE — 97530 THERAPEUTIC ACTIVITIES: CPT | Mod: PN

## 2017-05-30 PROCEDURE — 97110 THERAPEUTIC EXERCISES: CPT | Mod: PN

## 2017-05-30 NOTE — PROGRESS NOTES
Occupational Therapy    Date:  05/30/2017  Start Time:  1001  Stop Time:  1104     TIMED  Procedure Time Min.   Manual (1) Start:  Stop: 15   TherAct(1)  18   TherEx (2) Start:  Stop: 30       Total Timed Minutes:  63  Total Timed Units: 4  Total Untimed Units:  0  Charges Billed/# of units: 4    Progress/Current Status     Subjective:     Patient ID: Santino Pompa is a 22 y.o. female.  Diagnosis:   1. Elbow stiffness, right     2. Stiffness of joint of right forearm     3. Wrist stiffness, right     4. Impaired mobility and ADLs     5. Difficulty participating in leisure activities     6. Joint stiffness of hand, right       Pain: 0 /10 (FLACC)  Santino still pulls away when during R elbow flexion and composite R wrist flexion movements (FLACC 3-4/10).   Santino seemed to enjoy supine triceps WB, she laughed more than usual after.     Objective:     Mod A SPT w/c<>mat. Aerobics step placed under pt's feet for better postioning at EOM.   Removed resting orthosis, supination strap and edema glove.  PROM throughout individual joints in hand to improve mobility  Mobilization of wrist and forearm  PROM combined wrist and digit flexion  Triceps facilitation w/ rubber-band resistance reaching for target  WB w/ mobility orthosis during L UE contralateral reaching during game, mod support to posterior elbow  Facilitation of R UE triceps extension/ WB through the R UE in supine - pt actively pushing with encouragement, wearing mobility orthosis to continue encouraging positioning of the wrist at 45*.    Assessment:     Santino did a better job of paying attention to instructions during this session than in previous sessions.  She did well with the supine WB, but still had difficult engaging her triceps when WB seated.  Santino can continue to benefit from skilled OT services to retrain/rehabilitate R UE in order to engage with valued activities.    Patient Education/Response:     Continue HEP    Plans and Goals:     Continue  to stretch and mobilize R UE to allow for weight-bearing and engagement in leisure activities.       Francisco Lin, MOTS  5/30/17    I certify that I was present in the room directing the student in service delivery and guiding them using my skilled judgment. As the co-signing therapist I have reviewed the students documentation and am responsible for the treatment, assessment, and plan.   Arlene Mak, JACK, LOTR   5/30/2017

## 2017-06-01 ENCOUNTER — CLINICAL SUPPORT (OUTPATIENT)
Dept: REHABILITATION | Facility: HOSPITAL | Age: 22
End: 2017-06-01
Payer: MEDICAID

## 2017-06-01 DIAGNOSIS — R68.89 DIFFICULTY PARTICIPATING IN LEISURE ACTIVITIES: ICD-10-CM

## 2017-06-01 DIAGNOSIS — M25.631 STIFFNESS OF JOINT OF RIGHT FOREARM: ICD-10-CM

## 2017-06-01 DIAGNOSIS — Z78.9 IMPAIRED MOBILITY AND ADLS: ICD-10-CM

## 2017-06-01 DIAGNOSIS — M25.631 WRIST STIFFNESS, RIGHT: ICD-10-CM

## 2017-06-01 DIAGNOSIS — M25.641 JOINT STIFFNESS OF HAND, RIGHT: ICD-10-CM

## 2017-06-01 DIAGNOSIS — Z74.09 IMPAIRED MOBILITY AND ADLS: ICD-10-CM

## 2017-06-01 DIAGNOSIS — M25.621 ELBOW STIFFNESS, RIGHT: Primary | ICD-10-CM

## 2017-06-01 PROCEDURE — 97530 THERAPEUTIC ACTIVITIES: CPT | Mod: PN

## 2017-06-01 PROCEDURE — 97140 MANUAL THERAPY 1/> REGIONS: CPT | Mod: PN

## 2017-06-01 PROCEDURE — 97110 THERAPEUTIC EXERCISES: CPT | Mod: PN

## 2017-06-01 NOTE — PROGRESS NOTES
"Occupational Therapy    Date:  06/01/2017  Start Time:  0915  Stop Time:  1010    TIMED  Procedure Time Min.   TherEx (2) Start:  Stop: 30   Manual (1) Start:  Stop: 10   TherAct (1) Start:  Stop: 15   Total Timed Minutes:  55  Total Timed Units:  4  Total Untimed Units:  0  Charges Billed/# of units:  4    Progress/Current Status    Subjective:     Patient ID: Santino Pompa is a 22 y.o. female.  Diagnosis:   1. Elbow stiffness, right     2. Stiffness of joint of right forearm     3. Wrist stiffness, right     4. Impaired mobility and ADLs     5. Difficulty participating in leisure activities     6. Joint stiffness of hand, right       Pain: 0 /10  Pt in a good mood this morning, laughing. When asked if she had pain this morning, Santino replied "yes" to all forced choices and laughed.  Caregiver & mom report that Santino is putting "a little" more weight through the R UE when crawling and she has even started crawling up the stairs at home.   Mom and caregiver report continued compliance with initial ROM/stretching HEP given by evaluating OT from February.    Objective:     Mod A SPT w/c<>mat. Aerobics step placed under pt's feet for better postioning at EOM.   Removed resting orthosis, supination strap and edema glove.  PROM throughout individual joints in hand to improve mobility, progressed to combined wrist and digit flexion  Mobilization of wrist and forearm (towards supination) w/ stabilization of radial head, stretch to elbow flexion w/ forearm pronated and positioned towards supination as tolerated.    Triceps facilitation seated EOM with pt pushing against OT's hand w/ max cues, multiple trials, tapping to triceps and assist to maintain neutral pelvis.    WB w/ mobility orthosis during L UE contralateral reaching to "play ball" with mom, mod support to posterior elbow    Added padding to thenar prominence area of mobility orthosis to continue slow progression toward the RD, supination, wrist extension " necessary for comfortable weightbearing through palm    Assessment:     Family reporting increased WB and function at home in crawling with use of mobility orthosis. Improving tolerance for ROM to digit and wrist flexion. Pt would benefit from update to resting splint as current one has her in some ulnar deviation and she would benefit from more neutral positioning as well as readdition of thumb support.    Patient Education/Response:     Ed caregiver and mom re: PROM (flexion) to fingers, fingers and MCPs, fingers, MCPs and wrist to improve ROM/stretch to finger extensors. They v/u.    Plans and Goals:     Continue manual, ROM, WB. Update resting splint next week.    Arlene Mak, JACK, LOTR  6/1/2017

## 2017-06-06 ENCOUNTER — CLINICAL SUPPORT (OUTPATIENT)
Dept: REHABILITATION | Facility: HOSPITAL | Age: 22
End: 2017-06-06
Payer: MEDICAID

## 2017-06-06 DIAGNOSIS — M25.631 STIFFNESS OF JOINT OF RIGHT FOREARM: ICD-10-CM

## 2017-06-06 DIAGNOSIS — M25.631 WRIST STIFFNESS, RIGHT: ICD-10-CM

## 2017-06-06 DIAGNOSIS — Z78.9 IMPAIRED MOBILITY AND ADLS: ICD-10-CM

## 2017-06-06 DIAGNOSIS — R68.89 DIFFICULTY PARTICIPATING IN LEISURE ACTIVITIES: ICD-10-CM

## 2017-06-06 DIAGNOSIS — M25.621 ELBOW STIFFNESS, RIGHT: Primary | ICD-10-CM

## 2017-06-06 DIAGNOSIS — M25.641 JOINT STIFFNESS OF HAND, RIGHT: ICD-10-CM

## 2017-06-06 DIAGNOSIS — Z74.09 IMPAIRED MOBILITY AND ADLS: ICD-10-CM

## 2017-06-06 PROCEDURE — 97140 MANUAL THERAPY 1/> REGIONS: CPT | Mod: PN

## 2017-06-06 PROCEDURE — 97110 THERAPEUTIC EXERCISES: CPT | Mod: PN

## 2017-06-06 NOTE — PROGRESS NOTES
Occupational Therapy    Date:  06/06/2017  Start Time:  1005  Stop Time:  1050    TIMED  Procedure Time Min.   TherEx (2) Start:  Stop: 35   Manual (1) Start:  Stop: 10   Total Timed Minutes:  45  Total Timed Units:  3  Total Untimed Units:  0  Charges Billed/# of units:  3    Progress/Current Status    Subjective:     Patient ID: Santino Pompa is a 22 y.o. female.  Diagnosis:   1. Elbow stiffness, right     2. Stiffness of joint of right forearm     3. Wrist stiffness, right     4. Impaired mobility and ADLs     5. Difficulty participating in leisure activities     6. Joint stiffness of hand, right       Pain: 0 /10  Pt's caregiver reports that pt has been very sleepy this morning, not her usual self. Caregiver does not know how pt slept last night.  Pt unclear about whether she was having pain. She did indicate when directly asked that she was not feeling well.    Objective:     Pt w/ difficulty keeping eyes open throughout treatment. Max A w/c<>mat t/f w/ max cues that she needed to actively participate in transfer.  Sitting EOM w/ SBA-CGA and cues for remaining upright.  OT removed resting splint, edema glove and supination strap.  PROM for individual and composite joint flexion in fingers, hand and wrist.  Stretch to composite wrist/hand/finger extension.  Mobilization to promote supination and elbow flexion.  iniitated fabrication of a new resting hand splint with thumb support. Did not complete this session.  Terminated session early as pt resting on OT's shoulder and falling asleep.    Assessment:     Pt clearly not herself today, not even enjoying music. She tried to snuggle up to OT and stay there as if not feeling well, falling asleep on OT's shoulder. Discussed concerns with caregiver, who was present throughout session. Crawling/ WB deferred d/t pt's status. Santino can continue to benefit from skilled OT to improve ROM and ability to bear weight through the R UE for increased participation in  leisure activities at home.    Patient Education/Response:     Continue HEP.    Plans and Goals:     Complete new resting splint. Continue manual therapy and ROM as well as crawling/ WB with mobility orthosis.    JACK Castillo LOTR  6/6/2017

## 2017-06-08 ENCOUNTER — CLINICAL SUPPORT (OUTPATIENT)
Dept: REHABILITATION | Facility: HOSPITAL | Age: 22
End: 2017-06-08
Payer: MEDICAID

## 2017-06-08 DIAGNOSIS — Z74.09 IMPAIRED MOBILITY AND ADLS: ICD-10-CM

## 2017-06-08 DIAGNOSIS — Z78.9 IMPAIRED MOBILITY AND ADLS: ICD-10-CM

## 2017-06-08 DIAGNOSIS — M25.631 STIFFNESS OF JOINT OF RIGHT FOREARM: Primary | ICD-10-CM

## 2017-06-08 DIAGNOSIS — R68.89 DIFFICULTY PARTICIPATING IN LEISURE ACTIVITIES: ICD-10-CM

## 2017-06-08 DIAGNOSIS — M25.631 WRIST STIFFNESS, RIGHT: ICD-10-CM

## 2017-06-08 PROCEDURE — L3808 WHFO, RIGID W/O JOINTS: HCPCS | Mod: PN

## 2017-06-08 PROCEDURE — 97140 MANUAL THERAPY 1/> REGIONS: CPT | Mod: PN

## 2017-06-08 PROCEDURE — 97110 THERAPEUTIC EXERCISES: CPT | Mod: PN

## 2017-06-08 NOTE — PROGRESS NOTES
"OCCUPATIONAL THERAPY PROGRESS NOTE    Date:  06/08/2017    Start Time:  0900   Stop Time:  1000    TIMED  Procedure Time Min.   TherEx (1)  15   Manual (1) Start:  Stop: 15     UNTIMED  Supplies Time Min.     Start:  Stop: 30     Total Timed Minutes:  60  Total Timed Units:  3   Total Untimed Units:  1  Charges Billed/# of units:  4    Progress/Current Status    Subjective:     Patient ID: Santino Pompa is a 22 y.o. female.  Diagnosis:   1. Stiffness of joint of right forearm     2. Impaired mobility and ADLs     3. Wrist stiffness, right     4. Difficulty participating in leisure activities       Pain: 0 /10  Pt caretaker reported that Santino was up all night with her cousin watching TV prior to last session and that's why she's was tired.  Santino was in great spirits today repeatedly singing and dancing during session. Pt did not wince or pull away during elbow/wrist/finger stretch and mobilization.     Objective:     Pt completed SPT mat < > w/c with Min A  Sitting EOM w/ SBA-CGA and cues for remaining upright.  OT removed resting splint, edema glove and supination strap.  PROM for individual and composite joint flexion in fingers, hand and wrist.  Stretch to composite wrist/hand/finger extension.  WB though R palm w/ support to posterior elbow during L UE contralateral reaching for "high five-ing"   Composite ER and rotator cuff stretch  Gentle mobilization to promote supination and elbow flexion.  Completed fabrication of a new resting hand orthosis with thumb support, neutral deviation, wrist in ~20* extension, foam lining entire orthosis, which can accommodate edema glove and supination strap. Fit appeared good, no areas of pressure or concern. Ed mom re: wear and care.  OT replaced edema glove, supination strap, and splint.    Assessment:     Improved tolerance for ROM to composite flexion and forearm mobs compared to previous session. Santino can continue to benefit in skilled OT to address " functional mobility and participation in valued roles and routines. Anticipate she is nearing her prior level of function for crawling with the assist of the mobility orthosis. Expect 2 more visits.    Patient Education/Response:     Ed re: wear/care of resting orthosis. Handout given. Mom v/u.    Plans and Goals:     Inspect splint for wear issues; continue to stretching/mobilization of R UE and mobility.    Arlene Mak, JACK, LOTR  6/8/2017

## 2017-06-13 ENCOUNTER — CLINICAL SUPPORT (OUTPATIENT)
Dept: REHABILITATION | Facility: HOSPITAL | Age: 22
End: 2017-06-13
Payer: MEDICAID

## 2017-06-13 DIAGNOSIS — M25.621 ELBOW STIFFNESS, RIGHT: ICD-10-CM

## 2017-06-13 DIAGNOSIS — Z78.9 IMPAIRED MOBILITY AND ADLS: ICD-10-CM

## 2017-06-13 DIAGNOSIS — M25.631 STIFFNESS OF JOINT OF RIGHT FOREARM: Primary | ICD-10-CM

## 2017-06-13 DIAGNOSIS — M25.631 WRIST STIFFNESS, RIGHT: ICD-10-CM

## 2017-06-13 DIAGNOSIS — R68.89 DIFFICULTY PARTICIPATING IN LEISURE ACTIVITIES: ICD-10-CM

## 2017-06-13 DIAGNOSIS — M25.641 JOINT STIFFNESS OF HAND, RIGHT: ICD-10-CM

## 2017-06-13 DIAGNOSIS — Z74.09 IMPAIRED MOBILITY AND ADLS: ICD-10-CM

## 2017-06-13 PROCEDURE — 97140 MANUAL THERAPY 1/> REGIONS: CPT | Mod: PN

## 2017-06-13 PROCEDURE — 97530 THERAPEUTIC ACTIVITIES: CPT | Mod: PN

## 2017-06-13 PROCEDURE — 97110 THERAPEUTIC EXERCISES: CPT | Mod: PN

## 2017-06-15 ENCOUNTER — CLINICAL SUPPORT (OUTPATIENT)
Dept: REHABILITATION | Facility: HOSPITAL | Age: 22
End: 2017-06-15
Payer: MEDICAID

## 2017-06-15 DIAGNOSIS — Z74.09 IMPAIRED MOBILITY AND ADLS: ICD-10-CM

## 2017-06-15 DIAGNOSIS — M25.641 STIFFNESS OF RIGHT HAND JOINT: ICD-10-CM

## 2017-06-15 DIAGNOSIS — Z78.9 IMPAIRED MOBILITY AND ADLS: ICD-10-CM

## 2017-06-15 DIAGNOSIS — R68.89 DIFFICULTY PARTICIPATING IN LEISURE ACTIVITIES: ICD-10-CM

## 2017-06-15 DIAGNOSIS — M25.631 STIFFNESS OF JOINT OF RIGHT FOREARM: Primary | ICD-10-CM

## 2017-06-15 DIAGNOSIS — M25.621 ELBOW STIFFNESS, RIGHT: ICD-10-CM

## 2017-06-15 DIAGNOSIS — M25.631 WRIST STIFFNESS, RIGHT: ICD-10-CM

## 2017-06-15 PROCEDURE — 97110 THERAPEUTIC EXERCISES: CPT | Mod: PN

## 2017-06-15 PROCEDURE — 97140 MANUAL THERAPY 1/> REGIONS: CPT | Mod: PN

## 2017-06-30 NOTE — PROGRESS NOTES
Occupational therapy - Treatment and Discharge Summary    Date:  6/15/2017  Start Time:  0915  Stop Time:  1000    TIMED  Procedure Time Min.   TherEx (2) Start:  Stop: 30   Manual (1) Start:  Stop: 15   Total Timed Minutes:  45  Total Timed Units:  3  Total Untimed Units:  0  Charges Billed/# of units:  3    Progress/Current Status    Subjective:     Patient ID: Santino Pompa is a 22 y.o. female.  Diagnosis:   1. Stiffness of joint of right forearm     2. Impaired mobility and ADLs     3. Wrist stiffness, right     4. Difficulty participating in leisure activities     5. Elbow stiffness, right     6. Stiffness of right hand joint       Pain: no indications of pain at rest. Pt does express discomfort with end-range stretching. Caregiver indicates that her cousins slept in her room again last night, so she didn't sleep well.    Objective:     Santino arrived with caregiver, wearing edema glove, supination strap and resting orthosis. They did not bring the mobility orthosis.  SPT w/ mod A w/c<>mat.  Removed resting orthosis, edema glove and supination strap. No areas of pressure or redness noted.   PROM to IP flexion, MP/IP flexion, MP/IP/wrist flexion. Mobilization to improve supination.  Stretch to composite finger/hand/wrist extension. WB through the R palm w/ facilitation.   Reviewed HEP stretches with caregiver and encouraged her to continue having Santino maximize functional participation in all ADLs. Caregiver v/u.    Assessment:     Santino appears to be functioning near her baseline. She is appropriate for d/c from skilled OT at this point with the expectation that her family and caregiver will follow through with her stretching HEP, make use of her resting orthosis, encourage her active participation in ADLs and facilitate her active engagement in crawling with the use of her mobility orthosis. This was reviewed with the caregiver and she v/u.    New Short Term Goals (4 weeks - 4/21/2017- 5/19/2017)  1)  Family/caregiver to be mod I for strengthening HEP (for triceps - and any other muscles as appropriate pending progress). (NOT MET, ability to participate in formal strengthening HEP limited by cognition)  2) continued independence with HEP for ROM and scar management. (MET)     New Long Term Goal Status: (8 weeks - 4/21/2017-6/17/2017)  1) While wearing orthosis for crawling, Santino will be able to bear full weight through the R UE for crawling and reaching with the L UE in quadruped to increase engagement in leisure activities for increased quality of life and engagement in prior roles. (NOT MET - Santino is not bearing full weight though the R UE, but she is putting enough weight through the R UE to allow for repositioning the L UE in order to advance her crawl).  2) Santino's PROM in supination will improve to at least neutral for improvement movement in the R UE. (NOT MET)    Patient Education/Response:     As noted above.    Plans and Goals:     D/C. Please see attached d/c summary.    JACK Castillo, FÉLIXR  6/15/2017

## 2017-06-30 NOTE — PLAN OF CARE
REHAB SERVICES OUTPATIENT DISCHARGE SUMMARY  Occupational Therapy      Name:  Santino Pompa  Date:  6/15/2017  Date of Evaluation:  2/24/2017  Physician:  Lance Tyson MD  Total # Of Visits:  21  Cancelled:  0  No Shows:  0  Diagnosis:    1. Stiffness of joint of right forearm     2. Impaired mobility and ADLs     3. Wrist stiffness, right     4. Difficulty participating in leisure activities     5. Elbow stiffness, right     6. Stiffness of right hand joint         Physical/Functional Status:  Please see attached treatment note.    The patient is to be discharged from our Therapy service for the following reason(s):  Pt has partially met goals and appears to be functioning close to baseline. She is appropriate for d/c from skilled OT at this point with the expectation that her family and caregiver will follow through with her stretching HEP, make use of her resting orthosis, encourage her active participation in ADLs and facilitate her active engagement in crawling with the use of her mobility orthosis    Degree of Goal Achievement:  Patient has partially met goals. See attached treatment note.    Patient Education:  As above. Caregiver v/u.    Discharge Plan:  As above.    JACK Castillo LOTR  6/15/2017

## 2018-10-16 ENCOUNTER — HOSPITAL ENCOUNTER (EMERGENCY)
Facility: HOSPITAL | Age: 23
Discharge: HOME | End: 2018-10-17
Attending: EMERGENCY MEDICINE
Payer: MEDICAID

## 2018-10-16 VITALS
BODY MASS INDEX: 30.27 KG/M2 | OXYGEN SATURATION: 97 % | TEMPERATURE: 99 F | HEART RATE: 103 BPM | DIASTOLIC BLOOD PRESSURE: 74 MMHG | SYSTOLIC BLOOD PRESSURE: 111 MMHG | WEIGHT: 155 LBS | RESPIRATION RATE: 16 BRPM

## 2018-10-16 DIAGNOSIS — R21 GROIN RASH: Primary | ICD-10-CM

## 2018-10-16 PROCEDURE — 99284 EMERGENCY DEPT VISIT MOD MDM: CPT

## 2018-10-17 RX ORDER — MUPIROCIN CALCIUM 20 MG/G
CREAM TOPICAL 3 TIMES DAILY
Qty: 30 G | Refills: 1 | Status: SHIPPED | OUTPATIENT
Start: 2018-10-17 | End: 2018-10-27

## 2018-10-17 RX ORDER — NYSTATIN 100000 [USP'U]/G
POWDER TOPICAL 2 TIMES DAILY
Qty: 60 G | Refills: 0 | Status: SHIPPED | OUTPATIENT
Start: 2018-10-17 | End: 2018-10-17 | Stop reason: SDUPTHER

## 2018-10-17 RX ORDER — NYSTATIN 100000 [USP'U]/G
POWDER TOPICAL 2 TIMES DAILY
Qty: 60 G | Refills: 0 | Status: SHIPPED | OUTPATIENT
Start: 2018-10-17 | End: 2019-10-06 | Stop reason: ALTCHOICE

## 2018-10-17 RX ORDER — MUPIROCIN CALCIUM 20 MG/G
CREAM TOPICAL 3 TIMES DAILY
Qty: 30 G | Refills: 1 | Status: SHIPPED | OUTPATIENT
Start: 2018-10-17 | End: 2018-10-17 | Stop reason: SDUPTHER

## 2018-10-17 NOTE — DISCHARGE INSTRUCTIONS
Continue to change diaper frequently.  Use powder and cream as prescribed.  Call and schedule a follow up appointment with her primary care provider.  Return to the ER for any new or worsening symptoms.

## 2018-10-17 NOTE — ED PROVIDER NOTES
Encounter Date: 10/16/2018       History     Chief Complaint   Patient presents with    Diaper Rash     Patient is a 23 year old female who presents with rash to the groin for one day. Patient has PMH significant for DM, seizures, hydrocephalus and PDA s/p repair. Patient is incontinent and in diapers. Mother reports she changes her frequently. She reports she has been putting nystatin cream and Desitin cream to the area. She reports associated redness and swelling to the area. Mother denied any change in her urinary symptoms. She denied any fever or chills. Mother denied recurrent rashes to the groin.           Review of patient's allergies indicates:   Allergen Reactions    Latex, natural rubber Swelling    Demerol [meperidine]      Past Medical History:   Diagnosis Date    Anticoagulant long-term use     Blood transfusion     Diabetes mellitus     Hydrocephalus     Has 2 shunts    PDA (patent ductus arteriosus) At birth    repaired    Seizures      Past Surgical History:   Procedure Laterality Date    BRAIN SURGERY      CARDIAC SURGERY      EGD (ESOPHAGOGASTRODUODENOSCOPY) N/A 9/26/2012    [facility]formed by [facility] at [facility]    EYE SURGERY       Family History   Problem Relation Age of Onset    Hypertension Maternal Grandmother     COPD Maternal Grandfather     Hypertension Maternal Grandfather     Hypertension Paternal Grandmother     Kidney disease Paternal Grandfather     Hypertension Father      Social History     Tobacco Use    Smoking status: Never Smoker    Smokeless tobacco: Never Used   Substance Use Topics    Alcohol use: No    Drug use: No     Review of Systems   Constitutional: Negative for activity change, appetite change, chills and fever.   HENT: Negative for congestion, rhinorrhea and sore throat.    Eyes: Negative for redness and visual disturbance.   Respiratory: Negative for cough, chest tightness and shortness of breath.    Cardiovascular: Negative for chest  pain.   Gastrointestinal: Negative for abdominal pain, diarrhea, nausea and vomiting.   Genitourinary: Negative for dysuria and frequency.   Musculoskeletal: Negative for back pain, neck pain and neck stiffness.   Skin: Positive for rash.   Neurological: Negative for dizziness, syncope, numbness and headaches.       Physical Exam     Initial Vitals [10/16/18 2305]   BP Pulse Resp Temp SpO2   111/74 103 16 98.8 °F (37.1 °C) 97 %      MAP       --         Physical Exam    Constitutional: She is cooperative.  Non-toxic appearance. She does not have a sickly appearance.   HENT:   Head: Atraumatic.   Right Ear: External ear normal.   Left Ear: External ear normal.   Nose: Nose normal.   Mouth/Throat: Oropharynx is clear and moist.   Eyes: Conjunctivae and lids are normal. Pupils are equal, round, and reactive to light.   Neck: Normal range of motion and full passive range of motion without pain. Neck supple.   Cardiovascular: Normal rate, regular rhythm and normal heart sounds. Exam reveals no gallop and no friction rub.    No murmur heard.  Pulmonary/Chest: Breath sounds normal. She has no wheezes. She has no rhonchi. She has no rales.   Abdominal: Soft. Normal appearance. There is no tenderness. There is no rigidity, no rebound and no guarding.   Skin: Skin is warm, dry and intact. Rash noted.   Erythema and irritation noted to the groin. No satellite lesions. No petechia or purpura. No induration or fluctuance.          ED Course   Procedures  Labs Reviewed - No data to display       Imaging Results    None          Medical Decision Making:   History:   I obtained history from: someone other than patient.  Old Medical Records: I decided to obtain old medical records.       APC / Resident Notes:   Urgent evaluation of a 23 year old female that is chronically ill who presents with rash to groin. Patient is incontinent but changed frequently by mother. Patient is smiling. Afebrile and with stable vital signs. She has  erythema noted to the groin with no skin breakdown. Will do a nystatin powder to help keep the area dry and Bactroban ointment. Explained to mom the importance of keeping her dry. Return precautions given. Based on my clinical evaluation, I do not appreciate any immediate, emergent, or life threatening condition or etiology that warrants additional workup today and feel that the patient can be discharged with close follow up care.  Patient is to follow up with their primary care provider. Case was discussed with Dr. Andrews who is in agreement with the plan of care. All questions answered.                    Clinical Impression:   The encounter diagnosis was Groin rash.                             Brunilda Barnes PA-C  10/17/18 0053

## 2018-10-17 NOTE — ED NOTES
Pt presents with redness in perineal area surrounding vagina with edema to labia. Mom states symptoms began today.

## 2019-05-06 ENCOUNTER — HOSPITAL ENCOUNTER (EMERGENCY)
Facility: HOSPITAL | Age: 24
Discharge: HOME | End: 2019-05-06
Attending: EMERGENCY MEDICINE
Payer: MEDICAID

## 2019-05-06 VITALS
RESPIRATION RATE: 18 BRPM | OXYGEN SATURATION: 98 % | BODY MASS INDEX: 30.43 KG/M2 | WEIGHT: 155 LBS | HEIGHT: 60 IN | DIASTOLIC BLOOD PRESSURE: 78 MMHG | HEART RATE: 93 BPM | SYSTOLIC BLOOD PRESSURE: 139 MMHG | TEMPERATURE: 99 F

## 2019-05-06 DIAGNOSIS — R11.10 NON-INTRACTABLE VOMITING, PRESENCE OF NAUSEA NOT SPECIFIED, UNSPECIFIED VOMITING TYPE: Primary | ICD-10-CM

## 2019-05-06 DIAGNOSIS — R10.84 DIFFUSE ABDOMINAL PAIN: ICD-10-CM

## 2019-05-06 LAB
ALBUMIN SERPL BCP-MCNC: 3.5 G/DL (ref 3.5–5.2)
ALP SERPL-CCNC: 127 U/L (ref 55–135)
ALT SERPL W/O P-5'-P-CCNC: 17 U/L (ref 10–44)
ANION GAP SERPL CALC-SCNC: 8 MMOL/L (ref 8–16)
AST SERPL-CCNC: 21 U/L (ref 10–40)
BACTERIA #/AREA URNS HPF: ABNORMAL /HPF
BASOPHILS # BLD AUTO: 0 K/UL (ref 0–0.2)
BASOPHILS NFR BLD: 0.2 % (ref 0–1.9)
BILIRUB SERPL-MCNC: 0.2 MG/DL (ref 0.1–1)
BILIRUB UR QL STRIP: ABNORMAL
BUN SERPL-MCNC: 7 MG/DL (ref 6–20)
CALCIUM SERPL-MCNC: 9.6 MG/DL (ref 8.7–10.5)
CHLORIDE SERPL-SCNC: 107 MMOL/L (ref 95–110)
CLARITY UR: ABNORMAL
CO2 SERPL-SCNC: 25 MMOL/L (ref 23–29)
COLOR UR: ABNORMAL
CREAT SERPL-MCNC: 0.7 MG/DL (ref 0.5–1.4)
DIFFERENTIAL METHOD: ABNORMAL
EOSINOPHIL # BLD AUTO: 0.1 K/UL (ref 0–0.5)
EOSINOPHIL NFR BLD: 2 % (ref 0–8)
ERYTHROCYTE [DISTWIDTH] IN BLOOD BY AUTOMATED COUNT: 18.5 % (ref 11.5–14.5)
EST. GFR  (AFRICAN AMERICAN): >60 ML/MIN/1.73 M^2
EST. GFR  (NON AFRICAN AMERICAN): >60 ML/MIN/1.73 M^2
GLUCOSE SERPL-MCNC: 92 MG/DL (ref 70–110)
GLUCOSE UR QL STRIP: ABNORMAL
HCT VFR BLD AUTO: 33.8 % (ref 37–48.5)
HGB BLD-MCNC: 10.3 G/DL (ref 12–16)
HGB UR QL STRIP: ABNORMAL
KETONES UR QL STRIP: ABNORMAL
LEUKOCYTE ESTERASE UR QL STRIP: ABNORMAL
LYMPHOCYTES # BLD AUTO: 1.7 K/UL (ref 1–4.8)
LYMPHOCYTES NFR BLD: 22.9 % (ref 18–48)
MCH RBC QN AUTO: 21.1 PG (ref 27–31)
MCHC RBC AUTO-ENTMCNC: 30.4 G/DL (ref 32–36)
MCV RBC AUTO: 69 FL (ref 82–98)
MICROSCOPIC COMMENT: ABNORMAL
MONOCYTES # BLD AUTO: 0.7 K/UL (ref 0.3–1)
MONOCYTES NFR BLD: 9 % (ref 4–15)
NEUTROPHILS # BLD AUTO: 4.8 K/UL (ref 1.8–7.7)
NEUTROPHILS NFR BLD: 65.9 % (ref 38–73)
NITRITE UR QL STRIP: ABNORMAL
PH UR STRIP: ABNORMAL [PH] (ref 5–8)
PLATELET # BLD AUTO: 300 K/UL (ref 150–350)
PMV BLD AUTO: 9 FL (ref 9.2–12.9)
POTASSIUM SERPL-SCNC: 4.2 MMOL/L (ref 3.5–5.1)
PROT SERPL-MCNC: 7.7 G/DL (ref 6–8.4)
PROT UR QL STRIP: ABNORMAL
RBC # BLD AUTO: 4.88 M/UL (ref 4–5.4)
RBC #/AREA URNS HPF: 8 /HPF (ref 0–4)
SODIUM SERPL-SCNC: 140 MMOL/L (ref 136–145)
SP GR UR STRIP: ABNORMAL (ref 1–1.03)
SQUAMOUS #/AREA URNS HPF: 10 /HPF
URN SPEC COLLECT METH UR: ABNORMAL
UROBILINOGEN UR STRIP-ACNC: ABNORMAL EU/DL
WBC # BLD AUTO: 7.3 K/UL (ref 3.9–12.7)
WBC #/AREA URNS HPF: 12 /HPF (ref 0–5)

## 2019-05-06 PROCEDURE — 87088 URINE BACTERIA CULTURE: CPT

## 2019-05-06 PROCEDURE — 87086 URINE CULTURE/COLONY COUNT: CPT

## 2019-05-06 PROCEDURE — 87186 SC STD MICRODIL/AGAR DIL: CPT

## 2019-05-06 PROCEDURE — 85025 COMPLETE CBC W/AUTO DIFF WBC: CPT

## 2019-05-06 PROCEDURE — 63600175 PHARM REV CODE 636 W HCPCS: Performed by: EMERGENCY MEDICINE

## 2019-05-06 PROCEDURE — 87077 CULTURE AEROBIC IDENTIFY: CPT | Mod: 59

## 2019-05-06 PROCEDURE — 99284 EMERGENCY DEPT VISIT MOD MDM: CPT

## 2019-05-06 PROCEDURE — 36415 COLL VENOUS BLD VENIPUNCTURE: CPT

## 2019-05-06 PROCEDURE — 80053 COMPREHEN METABOLIC PANEL: CPT

## 2019-05-06 PROCEDURE — 25000003 PHARM REV CODE 250: Performed by: EMERGENCY MEDICINE

## 2019-05-06 PROCEDURE — 81000 URINALYSIS NONAUTO W/SCOPE: CPT

## 2019-05-06 RX ORDER — CEFTRIAXONE 1 G/1
1 INJECTION, POWDER, FOR SOLUTION INTRAMUSCULAR; INTRAVENOUS
Status: COMPLETED | OUTPATIENT
Start: 2019-05-06 | End: 2019-05-06

## 2019-05-06 RX ORDER — ONDANSETRON 4 MG/1
8 TABLET, ORALLY DISINTEGRATING ORAL
Status: COMPLETED | OUTPATIENT
Start: 2019-05-06 | End: 2019-05-06

## 2019-05-06 RX ORDER — CLOTRIMAZOLE 1 %
CREAM (GRAM) TOPICAL
Qty: 15 G | Refills: 0 | Status: SHIPPED | OUTPATIENT
Start: 2019-05-06 | End: 2019-10-06 | Stop reason: ALTCHOICE

## 2019-05-06 RX ORDER — ONDANSETRON HYDROCHLORIDE 8 MG/1
8 TABLET, FILM COATED ORAL EVERY 12 HOURS PRN
Qty: 6 TABLET | Refills: 2 | Status: SHIPPED | OUTPATIENT
Start: 2019-05-06 | End: 2019-10-06 | Stop reason: ALTCHOICE

## 2019-05-06 RX ADMIN — CEFTRIAXONE SODIUM 1 G: 1 INJECTION, POWDER, FOR SOLUTION INTRAMUSCULAR; INTRAVENOUS at 12:05

## 2019-05-06 RX ADMIN — ONDANSETRON 8 MG: 4 TABLET, ORALLY DISINTEGRATING ORAL at 11:05

## 2019-05-06 NOTE — ED PROVIDER NOTES
"Encounter Date: 5/6/2019    SCRIBE #1 NOTE: Joselyn LIM, pat scribing for, and in the presence of, Dr. Alistair Heath.       History     Chief Complaint   Patient presents with    Vomiting     x 3 days        Time seen by provider: 9:36 AM on 05/06/2019    Santino Pompa is a 24 y.o. female who presents to the ED for evaluation of N/V for the past x3 days. Per mother, the patient is still eating and drinking normally. She is having normal BM's. The patient has a "history of impaction" and UTI's. She also has a surgical history of  shunts. The patient denies recent fevers. The patient has no other medical concerns or complaints at this moment. PMHx includes PDA, DM, seizure, hydrocephalus, and anticoagulant long-term use. SHx includes brain surgery and cardiac surgery. Demerol allergy noted.     The history is provided by a parent.     Review of patient's allergies indicates:   Allergen Reactions    Latex, natural rubber Swelling    Demerol [meperidine]      Past Medical History:   Diagnosis Date    Anticoagulant long-term use     Blood transfusion     Diabetes mellitus     Hydrocephalus     Has 2 shunts    PDA (patent ductus arteriosus) At birth    repaired    Seizures      Past Surgical History:   Procedure Laterality Date    BRAIN SURGERY      CARDIAC SURGERY      EGD (ESOPHAGOGASTRODUODENOSCOPY) N/A 9/26/2012    [facility]formed [facility] at [facility]    EYE SURGERY       Family History   Problem Relation Age of Onset    Hypertension Maternal Grandmother     COPD Maternal Grandfather     Hypertension Maternal Grandfather     Hypertension Paternal Grandmother     Kidney disease Paternal Grandfather     Hypertension Father      Social History     Tobacco Use    Smoking status: Never Smoker    Smokeless tobacco: Never Used   Substance Use Topics    Alcohol use: No    Drug use: No     Review of Systems   Constitutional: Negative for chills and fever.   Respiratory: Negative " for cough.    Cardiovascular: Negative for leg swelling.   Gastrointestinal: Positive for nausea and vomiting. Negative for blood in stool, constipation and diarrhea.   Genitourinary: Negative for difficulty urinating and hematuria.   Musculoskeletal: Negative for joint swelling.   Skin: Negative for rash.   Neurological: Negative for seizures.   Hematological: Negative for adenopathy. Bruises/bleeds easily.   Psychiatric/Behavioral: The patient is not nervous/anxious.        Physical Exam     Initial Vitals [05/06/19 0908]   BP Pulse Resp Temp SpO2   126/70 102 18 98.8 °F (37.1 °C) 99 %      MAP       --         Physical Exam    Nursing note and vitals reviewed.  Constitutional: She appears well-developed and well-nourished.   HENT:   Head: Normocephalic and atraumatic.   Eyes: Conjunctivae are normal.   Neck: Normal range of motion. Neck supple.   Cardiovascular: Normal rate, regular rhythm and normal heart sounds. Exam reveals no gallop and no friction rub.    No murmur heard.  Pulmonary/Chest: Effort normal and breath sounds normal. No respiratory distress. She has no wheezes. She has no rhonchi. She has no rales.   Equal, bilateral breath sounds noted without wheezing.    Abdominal: Soft. She exhibits no distension. There is no tenderness. There is no rebound and no guarding.   No palpable abdominal tenderness noted.     Musculoskeletal: Normal range of motion.   Neurological: She is alert and oriented to person, place, and time.   Skin: Skin is warm and dry. No erythema.   Psychiatric: She has a normal mood and affect.         ED Course   Procedures  Labs Reviewed   CBC W/ AUTO DIFFERENTIAL - Abnormal; Notable for the following components:       Result Value    Hemoglobin 10.3 (*)     Hematocrit 33.8 (*)     Mean Corpuscular Volume 69 (*)     Mean Corpuscular Hemoglobin 21.1 (*)     Mean Corpuscular Hemoglobin Conc 30.4 (*)     RDW 18.5 (*)     MPV 9.0 (*)     All other components within normal limits    URINALYSIS, REFLEX TO URINE CULTURE - Abnormal; Notable for the following components:    Color, UA Red (*)     Appearance, UA Hazy (*)     All other components within normal limits    Narrative:     Preferred Collection Type->Urine, Clean Catch   URINALYSIS MICROSCOPIC - Abnormal; Notable for the following components:    RBC, UA 8 (*)     WBC, UA 12 (*)     Bacteria Many (*)     All other components within normal limits    Narrative:     Preferred Collection Type->Urine, Clean Catch   CULTURE, URINE   COMPREHENSIVE METABOLIC PANEL          Imaging Results          CT Abdomen Pelvis  Without Contrast (Final result)  Result time 05/06/19 10:29:07    Final result by Issac Souza MD (05/06/19 10:29:07)                 Impression:      No acute abdominopelvic process.    Discontinuous left ventricular peritoneal shunt catheter, likely abandoned.    Large rectal stool ball.      Electronically signed by: Issac Souza MD  Date:    05/06/2019  Time:    10:29             Narrative:    EXAMINATION:  CT ABDOMEN PELVIS WITHOUT CONTRAST    CLINICAL HISTORY:  Bowel obstruction, low-grade; Generalized abdominal pain    TECHNIQUE:  Low dose axial images, sagittal and coronal reformations were obtained from the lung bases to the pubic symphysis.  Oral contrast was not administered.    COMPARISON:  None    FINDINGS:  A left-sided ventricular peritoneal shunt catheter is present and is likely abandoned, with an 8 cm discontiguous segment of the left anterior chest, and only a short intra-abdominal segment.    The small bowel is normal in caliber.  The appendix is normal.  The colon is unremarkable.  A large rectal stool ball is present.    The liver, spleen, pancreas, adrenal glands, and kidneys are unremarkable.    There is no free air or free fluid.  The aorta is normal caliber.  No acute bony abnormality.                                 Medical Decision Making:   History:   Old Medical Records: I decided to obtain  old medical records.  ED Management:  24-year-old female presents with nausea and vomiting.  She has a history of a  shunt but has had no evidence of shunt occlusion with no lethargy or confusion.  CT of the abdomen and pelvis is normal with no evidence of bowel obstruction.  She is discharged with oral Zofran.       APC / Resident Notes:   I, Dr. Alistair Heath III, personally performed the services described in this documentation. All medical record entries made by the scribe were at my direction and in my presence.  I have reviewed the chart and agree that the record reflects my personal performance and is accurate and complete       Scribe Attestation:   Scribe #1: I performed the above scribed service and the documentation accurately describes the services I performed. I attest to the accuracy of the note.               Clinical Impression:       ICD-10-CM ICD-9-CM   1. Non-intractable vomiting, presence of nausea not specified, unspecified vomiting type R11.10 787.03   2. Diffuse abdominal pain R10.84 789.00         Disposition:   Disposition: Discharged  Condition: Stable                        Alistair Heath III, MD  05/06/19 [id number]

## 2019-05-08 LAB
BACTERIA UR CULT: NORMAL
BACTERIA UR CULT: NORMAL

## 2019-10-06 ENCOUNTER — HOSPITAL ENCOUNTER (EMERGENCY)
Facility: HOSPITAL | Age: 24
Discharge: HOME | End: 2019-10-06
Attending: EMERGENCY MEDICINE
Payer: MEDICAID

## 2019-10-06 VITALS
HEART RATE: 74 BPM | BODY MASS INDEX: 28.47 KG/M2 | DIASTOLIC BLOOD PRESSURE: 81 MMHG | TEMPERATURE: 99 F | RESPIRATION RATE: 20 BRPM | OXYGEN SATURATION: 99 % | SYSTOLIC BLOOD PRESSURE: 131 MMHG | HEIGHT: 60 IN | WEIGHT: 145 LBS

## 2019-10-06 DIAGNOSIS — G40.909 RECURRENT SEIZURES: Primary | ICD-10-CM

## 2019-10-06 LAB
ALBUMIN SERPL BCP-MCNC: 3.5 G/DL (ref 3.5–5.2)
ALP SERPL-CCNC: 115 U/L (ref 55–135)
ALT SERPL W/O P-5'-P-CCNC: 21 U/L (ref 10–44)
ANION GAP SERPL CALC-SCNC: 9 MMOL/L (ref 8–16)
AST SERPL-CCNC: 18 U/L (ref 10–40)
B-HCG UR QL: NEGATIVE
BASOPHILS # BLD AUTO: 0.01 K/UL (ref 0–0.2)
BASOPHILS NFR BLD: 0.1 % (ref 0–1.9)
BILIRUB SERPL-MCNC: 0.3 MG/DL (ref 0.1–1)
BILIRUB UR QL STRIP: NEGATIVE
BUN SERPL-MCNC: 9 MG/DL (ref 6–20)
CALCIUM SERPL-MCNC: 9.6 MG/DL (ref 8.7–10.5)
CHLORIDE SERPL-SCNC: 103 MMOL/L (ref 95–110)
CLARITY UR: CLEAR
CO2 SERPL-SCNC: 26 MMOL/L (ref 23–29)
COLOR UR: YELLOW
CREAT SERPL-MCNC: 0.7 MG/DL (ref 0.5–1.4)
CTP QC/QA: YES
DIFFERENTIAL METHOD: ABNORMAL
EOSINOPHIL # BLD AUTO: 0.1 K/UL (ref 0–0.5)
EOSINOPHIL NFR BLD: 0.7 % (ref 0–8)
ERYTHROCYTE [DISTWIDTH] IN BLOOD BY AUTOMATED COUNT: 17.8 % (ref 11.5–14.5)
EST. GFR  (AFRICAN AMERICAN): >60 ML/MIN/1.73 M^2
EST. GFR  (NON AFRICAN AMERICAN): >60 ML/MIN/1.73 M^2
GLUCOSE SERPL-MCNC: 102 MG/DL (ref 70–110)
GLUCOSE UR QL STRIP: NEGATIVE
HCT VFR BLD AUTO: 37.1 % (ref 37–48.5)
HGB BLD-MCNC: 10.9 G/DL (ref 12–16)
HGB UR QL STRIP: NEGATIVE
IMM GRANULOCYTES # BLD AUTO: 0.04 K/UL (ref 0–0.04)
KETONES UR QL STRIP: NEGATIVE
LEUKOCYTE ESTERASE UR QL STRIP: NEGATIVE
LYMPHOCYTES # BLD AUTO: 1.6 K/UL (ref 1–4.8)
LYMPHOCYTES NFR BLD: 18.9 % (ref 18–48)
MAGNESIUM SERPL-MCNC: 1.9 MG/DL (ref 1.6–2.6)
MCH RBC QN AUTO: 22.4 PG (ref 27–31)
MCHC RBC AUTO-ENTMCNC: 29.4 G/DL (ref 32–36)
MCV RBC AUTO: 76 FL (ref 82–98)
MONOCYTES # BLD AUTO: 0.4 K/UL (ref 0.3–1)
MONOCYTES NFR BLD: 4.9 % (ref 4–15)
NEUTROPHILS # BLD AUTO: 6.2 K/UL (ref 1.8–7.7)
NEUTROPHILS NFR BLD: 74.9 % (ref 38–73)
NITRITE UR QL STRIP: NEGATIVE
NRBC BLD-RTO: 0 /100 WBC
PH UR STRIP: 8 [PH] (ref 5–8)
PHOSPHATE SERPL-MCNC: 3.2 MG/DL (ref 2.7–4.5)
PLATELET # BLD AUTO: 331 K/UL (ref 150–350)
PMV BLD AUTO: 9.6 FL (ref 9.2–12.9)
POTASSIUM SERPL-SCNC: 4 MMOL/L (ref 3.5–5.1)
PROT SERPL-MCNC: 7.7 G/DL (ref 6–8.4)
PROT UR QL STRIP: NEGATIVE
RBC # BLD AUTO: 4.86 M/UL (ref 4–5.4)
SODIUM SERPL-SCNC: 138 MMOL/L (ref 136–145)
SP GR UR STRIP: 1.02 (ref 1–1.03)
URN SPEC COLLECT METH UR: NORMAL
UROBILINOGEN UR STRIP-ACNC: NEGATIVE EU/DL
WBC # BLD AUTO: 8.32 K/UL (ref 3.9–12.7)

## 2019-10-06 PROCEDURE — 63600175 PHARM REV CODE 636 W HCPCS: Performed by: EMERGENCY MEDICINE

## 2019-10-06 PROCEDURE — 99284 EMERGENCY DEPT VISIT MOD MDM: CPT | Mod: 25

## 2019-10-06 PROCEDURE — 83735 ASSAY OF MAGNESIUM: CPT

## 2019-10-06 PROCEDURE — 81003 URINALYSIS AUTO W/O SCOPE: CPT

## 2019-10-06 PROCEDURE — 85025 COMPLETE CBC W/AUTO DIFF WBC: CPT

## 2019-10-06 PROCEDURE — 80053 COMPREHEN METABOLIC PANEL: CPT

## 2019-10-06 PROCEDURE — 84100 ASSAY OF PHOSPHORUS: CPT

## 2019-10-06 PROCEDURE — 81025 URINE PREGNANCY TEST: CPT | Performed by: EMERGENCY MEDICINE

## 2019-10-06 PROCEDURE — 63600175 PHARM REV CODE 636 W HCPCS

## 2019-10-06 PROCEDURE — 96375 TX/PRO/DX INJ NEW DRUG ADDON: CPT

## 2019-10-06 PROCEDURE — 36415 COLL VENOUS BLD VENIPUNCTURE: CPT

## 2019-10-06 PROCEDURE — 96365 THER/PROPH/DIAG IV INF INIT: CPT

## 2019-10-06 PROCEDURE — 80177 DRUG SCRN QUAN LEVETIRACETAM: CPT

## 2019-10-06 RX ORDER — LEVETIRACETAM 100 MG/ML
1000 SOLUTION ORAL 2 TIMES DAILY
Qty: 600 ML | Refills: 11 | Status: ON HOLD | OUTPATIENT
Start: 2019-10-06 | End: 2022-05-19 | Stop reason: HOSPADM

## 2019-10-06 RX ORDER — ONDANSETRON 4 MG/1
4 TABLET, ORALLY DISINTEGRATING ORAL EVERY 6 HOURS PRN
Qty: 12 TABLET | Refills: 0 | Status: SHIPPED | OUTPATIENT
Start: 2019-10-06

## 2019-10-06 RX ORDER — LORAZEPAM 2 MG/ML
INJECTION INTRAMUSCULAR
Status: COMPLETED
Start: 2019-10-06 | End: 2019-10-06

## 2019-10-06 RX ORDER — LEVETIRACETAM 100 MG/ML
1000 SOLUTION ORAL 2 TIMES DAILY
Qty: 600 ML | Refills: 11 | Status: SHIPPED | OUTPATIENT
Start: 2019-10-06 | End: 2019-10-06 | Stop reason: SDUPTHER

## 2019-10-06 RX ADMIN — LORAZEPAM: 2 INJECTION, SOLUTION INTRAMUSCULAR; INTRAVENOUS at 12:10

## 2019-10-06 RX ADMIN — LEVETIRACETAM 1000 MG: 10 INJECTION INTRAVENOUS at 04:10

## 2019-10-06 NOTE — ED NOTES
Pt laying in bed in no distress. VSS. Pts family at bedside for support awaiting test results and updated plan of care.

## 2019-10-06 NOTE — ED NOTES
Pt moved from WC to stretcher. Pt actively seizing. Ativan pulled. IV attempted x 2. Second nurse at bedside for IV attempt.

## 2019-10-08 LAB — LEVETIRACETAM SERPL-MCNC: 15.6 UG/ML (ref 3–60)

## 2019-11-21 DIAGNOSIS — M62.838 MUSCLE SPASTICITY: Primary | ICD-10-CM

## 2019-12-11 ENCOUNTER — CLINICAL SUPPORT (OUTPATIENT)
Dept: REHABILITATION | Facility: HOSPITAL | Age: 24
End: 2019-12-11
Attending: PSYCHIATRY & NEUROLOGY
Payer: MEDICAID

## 2019-12-11 DIAGNOSIS — M25.631 STIFFNESS OF RIGHT WRIST JOINT: Primary | ICD-10-CM

## 2019-12-11 DIAGNOSIS — R26.89 DECREASED FUNCTIONAL MOBILITY: ICD-10-CM

## 2019-12-11 DIAGNOSIS — M62.838 MUSCLE SPASTICITY: ICD-10-CM

## 2019-12-11 DIAGNOSIS — R26.89 DECREASED FUNCTIONAL MOBILITY: Primary | ICD-10-CM

## 2019-12-11 PROCEDURE — 97165 OT EVAL LOW COMPLEX 30 MIN: CPT | Mod: PN

## 2019-12-11 PROCEDURE — 97161 PT EVAL LOW COMPLEX 20 MIN: CPT | Mod: PN

## 2019-12-11 NOTE — PLAN OF CARE
[facility]  Physical Therapy Neurological Rehabilitation Initial Evaluation    Name: Santino Pompa  Clinic Number: [id number]    Therapy Diagnosis:   Encounter Diagnoses   Name Primary?    Muscle spasticity     Decreased functional mobility Yes     Physician: Dieudonne Jones MD    Physician Orders: PT Eval and Treat   Medical Diagnosis from Referral: Muscle spasticity   Evaluation Date: 12/11/2019  Authorization Period Expiration: 12/31/2019  Plan of Care Expiration: 2/14/2020  Visit # / Visits authorized: 1/ 1    Time In: 0918  Time Out: 1000  Total Billable Time: 40 minutes    Precautions: Fall and skin (pressure and friction)    Subjective   Date of onset: 2 weeks ago.    History of current condition - Santino mom reports: that patient has been in her usual state of health with impaired mobility but received botox injections to B hamstrings about 2 weeks ago and MD wanted pt to receive PT following those injections.  She also received injections in her R arm at that time and she has orders for OT (scheduled for OT evaluation immediately following PT today).  According to medical record, patient had 2 seizures in October which were the first ones she had had in over a year.  Resulted in adjustment in medication.  Has been doing well since that time.       Medical History:   Past Medical History:   Diagnosis Date    Anticoagulant long-term use     Blood transfusion     Hydrocephalus     Has 2 shunts    PDA (patent ductus arteriosus) At birth    repaired    Seizures        Surgical History:   Santino Pompa  has a past surgical history that includes Brain surgery; Eye surgery; and Cardiac surgery. Tendon release R wrist.  Prior orthopedic surgeries several years ago.      Medications:   Santino has a current medication list which includes the following prescription(s): adalimumab, baclofen, lactulose, levetiracetam, and ondansetron.    Allergies:   Review of patient's  "allergies indicates:   Allergen Reactions    Latex, natural rubber Swelling    Demerol [meperidine]         Imaging, none:     Prior Therapy: Yes.  Last visit 8/12/2019 at [facility] ([place])    Social History:  lives with their family, has help at home to care for her.    Falls: No   DME: Manual wheelchair, Hand splint, and stander   Home Environment: Accessible   Exercise Routine / History: N/A   Family Present at time of Eval: Mom and assistant   Occupation: N/A  Prior Level of Function: Requires assistance for self care, transfers, nonambulatory.  Has been using stander at home but "not much due to crazy hours"  Current Level of Function: Dependent in self care activities.  Assists somewhat with stand pivot transfers.  Wheelchair dependent for mobility.      Pain:  Unable to verbalize.  Mom reports that patient is unable to "tell us" about that.  "We can tell if she flinches"    Pts/family goals: To prevent further deformity, to work with therapy to get appropriate AFOs (current pair is at least 8 years old)     Objective     Posture: Sitting: Pelvic obliquity noted with R side of pelvis higher than L.  R shoulder  Protracted, Flaring of L mid-lower ribs, weight shifted toward L.  Standing: requires assistance, weight shifted posteriorly.    Range of Motion/Strength:   Hip  Right   Left  Pain/Dysfunction with Movement    AROM PROM MMT AROM PROM MMT    Flexion  from 20*-50*  100*  2-/5  0* to 70*  90*  2+/5     Extension  N/T  -5*  2/5  N/T  0*  2/5  based on functional movement   Abduction  N/T  10*  1/5  N/T  25*  2-/5    Adduction  N/T  N/T  N/T  N/T  N/T  N/T    Internal rotation  N/T  50*  1/5  N/T  50*  1/5    External rotation  N/T  40*  2-/5  N/T  55*   2-/5       Knee  Right   Left  Pain/Dysfunction with Movement    AROM PROM MMT AROM PROM MMT    Flexion  N/T  130*  2-/5  N/T  135*  2-/5    Extension  N/T  -40*  1/5  N/T  -35*  1/5      Ankle  Right   Left  " Pain/Dysfunction with Movement    AROM PROM MMT AROM PROM MMT    Plantarflexion  N/T  25*  0/5  N/T  15*  0/5    Dorsiflexion  N/T  -5*  0/5  N/T  0*  0/5    Inversion          Eversion          Unable to fully assess active ROM due to patient's limited availability to follow instructions and perform AROM    Flexibility: Significant limitations in hip flexors, hip rotators, hamstrings, calves.    Gait: Nonambulatory  Bed Mobility: Able to roll on plinth to right and to left with minimal to moderate assistance.  Mod to max assistance for rolling to prone and returning to supine.  Max assistance for scooting   Transfers: Assistance - max assistance required for sit <> stand and stand pivot transfers.  Patients assists with forward weight sihift, extending hips and knees to achieve erect postition, and move feet with assistance for weight shift and verbal cues to advance each foot.    Special Tests: Balance:  Static sitting: able to maintain sitting balance on level surface without balance challenges.  Dynamic: unable to maintain balance without external support.  Standing:  Requires assistance to achieve upright standing position but is unable to achieve position without assistance and is unable to maintain upright position without external support.  Unable to assess dynamic standing balance.    Other: N/A      CMS Impairment/Limitation/Restriction for FOTO (N/A) Survey    Patient unable to complete due to cognitive deficits         TREATMENT   No treatment provided this date as authorization was for evaluation only.      Home Exercises and Patient Education Provided    Education provided:   - N/A this date.    Written Home Exercises Provided: Evaluation only this date.  Santino 's mom demonstrated good  understanding of the education provided.     See EMR under N/A for exercises provided N/A.    Assessment   Santino is a 24 y.o. female referred to outpatient Physical Therapy with a medical diagnosis of muscle spasticity.  Pt presents with impaired posture, impaired balance, impaired ROM of trunk and LE, impaired strength, decreased LE flexibility    Pt prognosis is limited.    Pt may benefit from skilled outpatient Physical Therapy to address the deficits stated above and in the chart below, provide pt/family education, and to minimize pt's level of dependence.     Plan of care discussed with caregiver: Yes  Pt's spiritual, cultural and educational needs considered and patient is agreeable to the plan of care and goals as stated below:     Anticipated Barriers for therapy: limited cognition for active participation    Medical Necessity is demonstrated by the following  History  Co-morbidities and personal factors that may impact the plan of care Co-morbidities:   education level, level of undertstanding of current condition and hydrocephalus, seizures    Personal Factors:   education level     high   Examination  Body Structures and Functions, activity limitations and participation restrictions that may impact the plan of care Body Regions:   lower extremities  trunk    Body Systems:    gross symmetry  ROM  strength  balance  transfers    Participation Restrictions:   Limited cognition for active participation    Activity limitations:   Learning and applying knowledge  watching  listening  learning to read  learning to write  learning to calculate  solving problems    General Tasks and Commands  undertaking a single task  undertaking multiple tasks    Communication  communicating with/receiving spoken language  communicating with/receiving non-verbal language    Mobility  lifting and carrying objects  fine hand use (grasping/picking up)  walking  moving around using equipment (WC)  using transportation (bus, train, plane, car)  driving (bike, car, motorcycle)    Self care  washing oneself (bathing, drying, washing hands)  caring for body parts (brushing teeth, shaving, grooming)  toileting  dressing  drinking  looking after one's  health    Domestic Life  shopping  cooking  doing house work (cleaning house, washing dishes, laundry)  assisting others    Interactions/Relationships  basic interpersonal interactions  complex interpersonal interactions    Life Areas  informal education  school education  basic economic transactions    Community and Social Life  community life  recreation and leisure         high   Clinical Presentation evolving clinical presentation with changing clinical characteristics moderate   Decision Making/ Complexity Score: low     Goals:  Short Term Goals: 4 weeks    1) Facilitate improved sitting posture with improved leveling of pelvis   2) Facilitate improved trunk ROM   3) Facilitate improved LE flexibility    Long Term Goals: 8 weeks    1) Patient will sit with level pelvis   2) Improve hip ROM to facilitate decreased difficulty for caregiver to assist with cleaning   3) Facilitate improved LE flexibility to assist with transfers   4) Family/caregiver will be independent in stretching activities to facilitate carryover.      Plan   Plan of care Certification: 12/11/2019 to 2/14/2020.    Outpatient Physical Therapy 2 times weekly for 8 weeks to include the following interventions: Manual Therapy, Neuromuscular Re-ed, Patient Education, Therapeutic Activites and Therapeutic Exercise.     Chelsea Beltran, PT

## 2019-12-16 NOTE — PROGRESS NOTES
Occupational Therapy   Evaluation    Santino Pompa   MRN: [id number]     OT eval complete. Please see attached POC for details. Thank you for consult!    JACK Castillo LOTR  12/11/2019

## 2019-12-16 NOTE — PLAN OF CARE
"  [facility] Occupational Therapy  Initial Neurological Evaluation     Date: 12/11/2019  Patient: Santino Pompa  Chart Number: [id number]    Therapy Diagnosis:   Encounter Diagnoses   Name Primary?    Muscle spasticity     Stiffness of right wrist joint Yes    Decreased functional mobility      Physician: Dieudonne Jones MD    Physician Orders: Eval and Treat  Medical Diagnosis: muscle spasticity (due to CP)  Evaluation Date: 12/11/2019  Plan of Care Expiration Period: 2/5/2020  Insurance Authorization period Expiration: 1/31/2019  Date of Return to MD: TBA  Visit # / Visits Authorized: 1 / 20    Time In: 1010  Time Out: 1055  Total Billable (one on one) Time: 45 minutes    Precautions: Standard and Fall; decreased verbal communication    Subjective     History of Current Condition: Santino has cerebral palsy with severe functional deficits. She was seen in 2017 following a right wrist flexor tendon release. At that time, we were able to improve her wrist extension PROM to about 45* and with a custom orthosis, she was able to crawl, which is her primary way of mobilization in the home with maximum independence. She returns to therapy today because of increasing tightness in the right wrist. She recently received botox to her R leg and thumb.    Involved Side: right  Dominant Side: Left  Date of Onset: 11/7/2019  Previous Therapy: Most recently, pt was seen in [place] [facility] this summer. Pt was last seen in this clinic in 2017 as noted above.     Patient's Goals for Therapy: Pt unable to state goals. Mom reports goals as "get proper splints; get her to maneuver."    Pain:  Pain Related Behaviors Observed: no     Functional Limitations/Social History:    Prior Level of Function: Santino requiring max to total A for BADLs and mod-max A for stand-pivot transfers. With the use of the custom wrist orthosis that was fabricated by this clinic in 2017, she was able to crawl in her home, allowing " "her to play ball and playfully interact with her family. As of April 2017, she had active R UE shoulder flexion, abduction/ adduction, horizontal abduction/adduction. She had active elbow extension, but no purposeful elbow flexion. She also did not have purposeful movement in the forearm, wrist or hand.    Current Level of Function: Mom continues to report similar function in BADLs and t/f's. She is still crawling, but has less incorporation of the R UE and is slipping out of the crawling orthosis fabricated by this OT in 2017. Her thumb is adducting and it's difficult to open her hand. Her crawling wrist orthosis is wearing out and "she is not bending the R arm anymore."    Home/Living environment: No concerns. Santino lives with her parents.  DME: manual w/c, hand-splints, stander     Leisure: spending time with family, music (especially Delfino Miner), crawling and playing ball    Past Medical History/Physical Systems Review:     Past Medical History:  Santino Pompa  has a past medical history of Anticoagulant long-term use, Blood transfusion, Hydrocephalus, PDA (patent ductus arteriosus), and Seizures; cerebral palsy    Past Surgical History:  Santino Pompa  has a past surgical history that includes Brain surgery; Eye surgery; and Cardiac surgery.    Current Medications:  Santino has a current medication list which includes the following prescription(s): adalimumab, baclofen, lactulose, levetiracetam, and ondansetron.    Allergies:  Review of patient's allergies indicates:   Allergen Reactions    Latex, natural rubber Swelling    Demerol [meperidine]       Objective     Cognitive Exam:  Santino was alert and actively participated in her eval today. She followed single step instructions. Communication is impaired.     Physical Exam:    Santino presented in an old edema glove, thumb splint and supination strap.    R sh flexion=75a, 105p  R elbow flex=0a, 100p  R elbow ext=WFL a  She rests in pronation. " "And passive supination is ~30* shy of neutral.  R wrist ext=30*p  R wrist flex=60*p  Her hand is cupped and thumb rests under the index finger.  Digits have full PROM except for min stiffness in the middle finger.    Tone:  R biceps appears flaccid with no active movement noted  Min increase in tone in R triceps  Increased tone in wrist flexors, thumb adduction  - muscle length of wrist flexors is decreased  Modified Genia: 2 More marked increase in muscle tone through most of the ROM, but affected part(s) easily moved    Sensation:   Diminished light touch. Deep pressure appears intact.     Balance:   Supervision seated at EOM for static sitting balance.  Impaired dynamic sitting balance and standing balance.    Endurance Deficit: moderate                    Functional Status      Functional Mobility:  Mod A SPT EOM to w/c.  OT doffed orthoses to give Ambry the chance to crawl. She was able to crawl on the mat, but did not incorporate the R UE. Family did not bring crawling orthosis. They say she is slipping out of it (it was fabricated to position her at 45* wrist ext.)     ADL's:  Total A to don zip-up sweatshirt, though she was able to follow directions to thread the raise the R UE so mom could thread that arm and then was also able to thread L UE into the sweatshirt with min A.    UE Orthoses:  As noted, they did not bring crawling orthosis, but report that it is "wearing out" and she is slipping out of it.  Per mom, she is not wearing resting hand splint at home.  She has a neoprene/fiberglass hand-based thumb spica splint to keep the thumb in radial abduction but mom doesn't think it's doing the job.  She has an old and worn out edema glove.  She has a supination strap that appears in quite good condition considering that mom says they use it consistently and it's ~3 years old.    Treatment       Home Exercises and Patient Education Provided    Education provided:   -role of OT, goals for OT, " scheduling/cancellations, insurance limitations with patient.  -OT issued new edema glove for the R UE, x-tra small.  -OT reviewed with mom the stretches they need to be doing, including thumb and wrist. She declined handouts.  -OT requested mom to bring all orthoses to next visit.  -OT assessed thumb orthosis and will look for alternatives, though this one is soft, yet structured. She may benefit from a hand-based custom thumb spica.    Written Home Exercises Provided: no- mom declined. They have handouts from previous therapy sessions.    Assessment     Santino Pompa is a 24 y.o. female referred to outpatient occupational therapy and presents with a medical diagnosis of muscle spasticity resulting from cerebral palsy, resulting in decreased flexibility and decreased range of motion, limiting her ability to crawl in her home and increasing risk of future contracture and skin concerns. She demonstrates limitations as described in the chart below. Following medical record review it is determined that pt will benefit from occupational therapy services in order to maximize pain free and/or functional use of R UE.     Pt prognosis is Fair due to chronic nature of underlying condition (CP) and long-standing nature of ROM deficits, though they are currently exacerbated.    Pt will benefit from skilled outpatient Occupational Therapy to address the deficits stated above and in the chart below, provide pt/family education, and to maximize pt's level of independence.     Plan of care discussed with patient: Yes (and mom)  Pt's spiritual, cultural and educational needs considered and patient is agreeable to the plan of care and goals as stated below:     Anticipated Barriers for therapy: communication; history of inconsistent follow-through with HEP    Medical Necessity is demonstrated by the following  Profile and History Assessment of Occupational Performance Level of Clinical Decision Making Complexity Score    Occupational Profile:   Santino Pompa is a 24 y.o. female who lives with her parents.   Satnino Pompa has difficulty with all activities, but especially with crawling and using the R UE for supporting herself in quadruped,   affecting her daily functional abilities. Her mom's main goal for therapy is to improve her splints and increase her ability to crawl.     Comorbidities:   CP, history of hydrocephalus and brain sx, decreased communication    Medical and Therapy History Review:   Brief               Performance Deficits    Physical:  Joint Mobility  Joint Stability  Muscle Power/Strength  Muscle Endurance  Edema  Control of Voluntary Movement  Gross Motor Coordination  Fine Motor Coordination  Proprioception Functions  Tactile Functions  Muscle Tone  Postural Control    Cognitive:  Attention  Initiation  Communication    Psychosocial:    Social Interaction  Routines     Clinical Decision Making:  low    Assessment Process:  Problem-Focused Assessments    Modification/Need for Assistance:  Minimal-Moderate Modifications/Assistance    Intervention Selection:  Limited Treatment Options       low  Based on PMHX, co morbidities , data from assessments and functional level of assistance required with task and clinical presentation directly impacting function.       The following goals were discussed with the patient and patient is in agreement with them as to be addressed in the treatment plan.     Goals:  Short Term Goals: 4 weeks  (12/11/2019-1/8/2020)  1) Santino's family will be mod I w/ initial HEP.  2) Santino will tolerate progressive adjustments to new/modified orthoses (as appropriate) to improve thumb positioning, as well as to improve wrist support for crawling.  3) Wrist extension PROM will improve to 40* or better.    Long Term Goals: 8 weeks (12/11/2019-2/5/2020)  1) Santino's family will be mod I w/ d/c HEP.  2) Santino's family will be mod I with orthotic wear and care.  3) Santino will be able  to crawl the length of the mat x3 with crawling orthosis remaining in place and not slipping off.  4) Santino will demo 45-50* passive wrist extension to increase indep w/ crawling.    Plan   Certification Period/Plan of care expiration: 12/11/2019 to 2/5/2020.    Outpatient Occupational Therapy 2 times weekly for 8 weeks to include the following interventions: Manual Therapy, Moist Heat/ Ice, Neuromuscular Re-ed, Orthotic Management and Training, Therapeutic Taping, Patient Education, Self Care, Therapeutic Activites and Therapeutic Exercise.    **OT anticipates that the crawling orthosis may need to be modified. However, for long-term use, Santino would benefit from one made from an orthotist as their plastics are stronger and will last longer. OT encouraged mom to call her insurance company to find the preferred providers for orthotics so we can begin the process of having an orthotist make an orthosis that Santino will be able to use for crawling for the forseeable future.    Arlene Mak, OT      I certify the need for these services furnished under this plan of treatment and while under my care.  ____________________________________ Physician/Referring Practitioner   Date of Signature

## 2020-01-23 ENCOUNTER — CLINICAL SUPPORT (OUTPATIENT)
Dept: REHABILITATION | Facility: HOSPITAL | Age: 25
End: 2020-01-23
Payer: MEDICAID

## 2020-01-23 DIAGNOSIS — M62.838 MUSCLE SPASTICITY: Primary | ICD-10-CM

## 2020-01-23 DIAGNOSIS — M25.631 STIFFNESS OF RIGHT WRIST JOINT: ICD-10-CM

## 2020-01-23 PROCEDURE — 97530 THERAPEUTIC ACTIVITIES: CPT | Mod: PN

## 2020-01-23 NOTE — PROGRESS NOTES
"  Occupational Therapy Daily Treatment Note & Status Update     Date: 1/23/2020  Name: Santino Pompa  Clinic Number: [id number]    Therapy Diagnosis:   Encounter Diagnoses   Name Primary?    Muscle spasticity Yes    Stiffness of right wrist joint      Physician: Dieudonne Jones MD    Physician Orders: Eval and Treat  Medical Diagnosis: muscle spasticity (due to CP)  Evaluation Date: 12/11/2019  Plan of Care Expiration Period: 2/5/2020  Insurance Authorization period Expiration: 1/31/2019  Date of Return to MD: TBA    Visit # / Visits Authorized: 2 / 20    Time In:0905  Time Out: 1000  Total Billable Time: 55 minutes    Precautions:  Standard and Fall      Subjective     Caregiver reports she has been stretching [place] on Tuesdays and Fridays when she is at the house. She can not speak to whether family is stretching [place] on the other days.      Response to previous treatment:no concerns  Functional change: Nothing noted by caregiver    Pain: 0/10 at rest, based on FLACC, as well as Santino's denial of pain. She did express min discomfort during stretching of R wrist (~3/10 on FLACC) and stretch was modified to reduce discomfort.    Objective     Santino participated in dynamic functional therapeutic activities to improve functional performance for 55 minutes, including:  -doffing winter jacket w/ mod A  -t/f w/c<>mat w/ mod A, difficulty keeping R LE activated for progression of L LE  -Gentle mobilization of metacarpals, carpals, radius and ulna with stretching of the surrounding soft tissues to decrease edema and improve PROM, potentially allowing for increases in active movement.   -gentle stretching to digits for IP flexion, MCP flexion, composite flexion  -gentle stretching of wrist to flexion for about 30s.  -gentle stretching of elbow to flexion, 5x10s w/ [place] encouraged to "lean into" OT's hand, encouraging active engagement in the stretching.  -the remainder of the tx was focused on stretching the " wrist to extension, WB through the R wrist to stretch to extension and active movement of the R shoulder.  -attempted R sh flexion, but Santino automatically moved into abudction  -R UE horizontal add/abd to reach for w/c contralaterally and for OT's knee ipsilaterally, 2x3 sets with max encouragement for full participation to increase control of R shoulder, which should improve control during crawling.  -during R UE WB, Santino reached forward and contralaterally with the L UE to move small objects, 2x6. She completed the 1st set with good engagement, but required max cues on the 2nd set.    Home Exercises and Education Provided     Education provided:   - encourage Santino to lean into stretches, allowing increased active participation on her part for stretching. Caregiver v/u.  - Progress towards goals     Written Home Exercises Provided: Patient instructed to cont prior HEP.  Exercises were reviewed and Santino was able to demonstrate them prior to the end of the session.  Santino's caregiver demonstrated good  understanding of the HEP provided.      Assessment     Pt would continue to benefit from skilled OT. She actively engaged in therapy today, including doffing/donning her jacket. However, in blocked practice of AROM, she did not demonstrate as much movement in the R shoulder as she did during dressing. Wrist extension remains around 30*, limiting potential for incorporation of the R UE during crawling. No goals met as this is Santino's first visit since initial eval.    Santino is progressing well towards her goals and there are no updates to goals at this time. Pt prognosis is Fair.     Pt will continue to benefit from skilled outpatient occupational therapy to address the deficits listed in the problem list on initial evaluation provide pt/family education and to maximize pt's level of independence in the home and community environment.     Anticipated barriers to occupational therapy: communication; history of  inconsistent follow-through with HEP    Pt's spiritual, cultural and educational needs considered and pt agreeable to plan of care and goals.    Goals:  Short Term Goals: 4 weeks  (12/11/2019-1/8/2020)  1) Santino's family will be mod I w/ initial HEP. (progressing 1/23/2020)  2) Santino will tolerate progressive adjustments to new/modified orthoses (as appropriate) to improve thumb positioning, as well as to improve wrist support for crawling. (progressing 1/23/2020)  3) Wrist extension PROM will improve to 40* or better. (progressing 1/23/2020)     Long Term Goals: 8 weeks (12/11/2019-2/5/2020)  1) Santino's family will be mod I w/ d/c HEP. (progressing 1/23/2020)  2) Santino's family will be mod I with orthotic wear and care. (progressing 1/23/2020)  3) Santino will be able to crawl the length of the mat x3 with crawling orthosis remaining in place and not slipping off. (progressing 1/23/2020)  4) Santino will demo 45-50* passive wrist extension to increase indep w/ crawling. (progressing 1/23/2020)    Plan     Continue stretching and WB.     Arlene Mak, OT

## 2020-01-29 ENCOUNTER — CLINICAL SUPPORT (OUTPATIENT)
Dept: REHABILITATION | Facility: HOSPITAL | Age: 25
End: 2020-01-29
Payer: MEDICAID

## 2020-01-29 DIAGNOSIS — R26.89 DECREASED FUNCTIONAL MOBILITY: ICD-10-CM

## 2020-01-29 DIAGNOSIS — M62.838 MUSCLE SPASTICITY: Primary | ICD-10-CM

## 2020-01-29 DIAGNOSIS — M25.631 STIFFNESS OF RIGHT WRIST JOINT: ICD-10-CM

## 2020-01-29 DIAGNOSIS — M62.838 MUSCLE SPASTICITY: ICD-10-CM

## 2020-01-29 PROCEDURE — 97530 THERAPEUTIC ACTIVITIES: CPT | Mod: PN

## 2020-01-29 PROCEDURE — 97110 THERAPEUTIC EXERCISES: CPT | Mod: PN

## 2020-01-29 NOTE — PROGRESS NOTES
"  Occupational Therapy Daily Treatment Note     Date: 1/29/2020  Name: Santino Pompa  Clinic Number: [id number]    Therapy Diagnosis:   Encounter Diagnoses   Name Primary?    Muscle spasticity Yes    Stiffness of right wrist joint      Physician: Dieudonne Jones MD    Physician Orders: Eval and Treat  Medical Diagnosis: muscle spasticity (due to CP)  Evaluation Date: 12/11/2019  Plan of Care Expiration Period: 2/5/2020  Insurance Authorization period Expiration: 12/31/2020  Date of Return to MD: Back to MD in early February; mom states they will get recommendations for orthotist at next MD appt.    Visit # / Visits Authorized: 3/ 20    Time In:0905  Time Out: 1000  Total Billable Time: 55 minutes    Precautions:  Standard and Fall      Subjective     Mom is present with Santino today. No concerns or functional change to report.       Response to previous treatment: no concerns  Functional change: Nothing noted by mom     Pain: 0/10 at rest, based on FLACC, as well as Santino's denial of pain. She did no c/o pain during wrist stretching or WB today.     Objective     Santino participated in dynamic functional therapeutic activities to improve functional performance for 55 minutes, including:    -Stand-pivot t/f w/c<>mat w/ max A, difficulty keeping R LE activated for progression of L LE. 2nd person assist provided for t/f at end of session.  -Gentle mobilization of metacarpals, carpals, radius and ulna with stretching of the surrounding soft tissues to decrease edema and improve PROM, potentially allowing for increases in active movement.   -gentle stretching to digits for IP flexion, MCP flexion, composite flexion  -gentle stretching of elbow to flexion, 5x10s w/ Santino encouraged to "lean into" OT's hand, encouraging active engagement in the stretching.  -stretching wrist to extension; WB through R wrist to stretch to extension with L UE contralateral reaching. Santino also "danced" to 2 songs during WB, allowing " "for increased passive movement through the wrist without c/o pain.  -AAROM R UE ipsilateral and contralateral reaching, 2x3 for each w/ max cues for encouraging active movement.  -Santino initiated hug w/ OT and required cues to incorporate the R UE.  -kneeling x~30s w/ mod A for balance  -prone w/ max assist for weight-shift to the R to increase WB through R forearm, allowing for offloading the L UE to engage in ball rolling. However, she seemed to prefer increased WB through the L elbow.  -quadruped w/ mod A for weight-shift to the R, hand on down-angle wedge to increase comfort, max A to support R elbow as she was not engaging triceps for WB through the R UE. L UE again engaging in ball roll. However, rather than relying on the R UE w/ support, she tended to shift her weight back and left, where she is used to being comfortable.  -prone on 20* wedge allowing for AROM of ashley UE from the shoulders. However, she required max A for movement of the R UE. L UE was able to engage in ball rolling and tossing. PROM in the R shoulder with OT supporting her arm in ~120* sh flexion while she was "dancing" in prone on wedge.  -min-max A for mobility on mat to move from sitting EOM->prone->kneeling->prone on wedge->sitting EOM w/ increased time.  After wrist stretching PROM wrist ext=50    Home Exercises and Education Provided     Education provided:   - please bring orthosis for crawling to next session. Mom v/u.   -continue stretches at home. Mom v/u.  - Progress towards goals     Written Home Exercises Provided: Patient instructed to cont prior HEP.  Exercises were reviewed and Santino was able to demonstrate them prior to the end of the session.  Santino's caregiver demonstrated good  understanding of the HEP provided.      Assessment     Pt would continue to benefit from skilled OT. Great participation throughout our therapy session today. Santino also demo'd increased PROM toward wrist extension today, which is required for " active engagement of the R UE during crawling. Having a 2nd person assist today allowed for increased interaction and engagement with Santino as OT was able to support pt in WB while assistant provided engagement during reaching activities. Santino appeared to respond well to this.     Santino is progressing well towards her goals and there are no updates to goals at this time. Pt prognosis is Fair.     Pt will continue to benefit from skilled outpatient occupational therapy to address the deficits listed in the problem list on initial evaluation provide pt/family education and to maximize pt's level of independence in the home and community environment.     Anticipated barriers to occupational therapy: communication; history of inconsistent follow-through with HEP    Pt's spiritual, cultural and educational needs considered and pt agreeable to plan of care and goals.    Goals:  Short Term Goals: 4 weeks  (12/11/2019-1/8/2020)  1) Santino's family will be mod I w/ initial HEP. (progressing 1/29/2020)  2) Santino will tolerate progressive adjustments to new/modified orthoses (as appropriate) to improve thumb positioning, as well as to improve wrist support for crawling. (progressing 1/29/2020)  3) Wrist extension PROM will improve to 40* or better. (MET 1/29/2019)      Long Term Goals: 8 weeks (12/11/2019-2/5/2020)  1) Santino's family will be mod I w/ d/c HEP. (progressing 1/29/2020)  2) Santino's family will be mod I with orthotic wear and care. (progressing 1/29/2020)  3) Santino will be able to crawl the length of the mat x3 with crawling orthosis remaining in place and not slipping off. (progressing 1/29/2020)  4) Santino will demo 45-50* passive wrist extension to increase indep w/ crawling. (progressing 1/29/2020)    Plan     Continue stretching and WB.     Arlene Mak, OT

## 2020-01-31 ENCOUNTER — CLINICAL SUPPORT (OUTPATIENT)
Dept: REHABILITATION | Facility: HOSPITAL | Age: 25
End: 2020-01-31
Payer: MEDICAID

## 2020-01-31 DIAGNOSIS — M62.838 MUSCLE SPASTICITY: Primary | ICD-10-CM

## 2020-01-31 DIAGNOSIS — M62.838 MUSCLE SPASTICITY: ICD-10-CM

## 2020-01-31 DIAGNOSIS — M25.631 STIFFNESS OF RIGHT WRIST JOINT: ICD-10-CM

## 2020-01-31 DIAGNOSIS — R26.89 DECREASED FUNCTIONAL MOBILITY: ICD-10-CM

## 2020-01-31 PROCEDURE — 97110 THERAPEUTIC EXERCISES: CPT | Mod: PN,CQ

## 2020-01-31 PROCEDURE — 97530 THERAPEUTIC ACTIVITIES: CPT | Mod: PN

## 2020-01-31 NOTE — PROGRESS NOTES
"  Occupational Therapy Daily Treatment Note     Date: 1/31/2020  Name: Santino Pompa  Clinic Number: [id number]    Therapy Diagnosis:   Encounter Diagnoses   Name Primary?    Muscle spasticity Yes    Stiffness of right wrist joint      Physician: Dieudonne Jones MD    Physician Orders: Eval and Treat  Medical Diagnosis: muscle spasticity (due to CP)  Evaluation Date: 12/11/2019  Plan of Care Expiration Period: 2/5/2020  Insurance Authorization period Expiration: 12/31/2020  Date of Return to MD: Back to MD in early February; mom states they will get recommendations for orthotist at next MD appt.    Visit # / Visits Authorized: 4/20    Time In: 0902  Time Out: 1000  Total Billable Time: 55 minutes    Precautions:  Standard and Fall      Subjective     Brother (Alberto) is present with Santino today. No concerns or functional change to report.       Response to previous treatment: no concerns  Functional change: Nothing noted by brother     Pain: 0/10 at rest, based on FLACC, as well as Santino's denial of pain. She did no c/o pain during wrist stretching or WB today.     Objective     Santino participated in dynamic functional therapeutic activities to improve functional performance for 55 minutes, including:    -Stand-pivot t/f w/c->mat w/ max A, difficulty keeping R LE activated for progression of L LE.   -Gentle mobilization of metacarpals, carpals, radius and ulna with stretching of the surrounding soft tissues to decrease edema and improve PROM, potentially allowing for increases in active movement.   -gentle stretching to digits for IP flexion, MCP flexion, composite flexion  -gentle stretching of elbow to flexion, 5x10s w/ Santino encouraged to "lean into" OT's hand, encouraging active engagement in the stretching.  -stretching wrist to extension; WB through R wrist to stretch to extension with L UE contralateral reaching. Santino also "danced" to 2 songs during WB, allowing for increased passive movement through " "the wrist without c/o pain.  -AAROM R UE ipsilateral and forward reaching, 2x3 for each w/ max cues and min A for encouraging active movement.  -attempted overhead AROM ashley UE to "dance" to another song at EOM, but she required max A lift arms  -sit->prone w/ min A. In prone w/ air-cast to R UE, we completed stretching of shoulder to increased external rotation, but were only able to achieve just past neutral.  -prone->quadruped w/ max A, air-cast to R UE to assist w/ ability to bear weight in quadruped. Min A to keep shifted to the right to encourage increased WB through the R UE. Positioned R hand on down-sloping wedge to increase comfort due to decreased PROM in wrist extension. While in quadruped, Santino rolled the ball back and forth with a second person, requiring increased time to see and respond to the movement of the ball and assist as already noted to shift weight to the right during L UE reaching, allowing increased WB through the R UE.  -quadruped->sit w/ CGA-min A; scooting from middle to EOM w/ CGA-min A  -pt at EOM at end of session, awaiting PT w/ therapy tech and brother in room.    Home Exercises and Education Provided     Education provided:   - please bring orthosis for crawling to next session. Mom v/u.   -continue stretches at home. Mom v/u.  - Progress towards goals     Written Home Exercises Provided: Patient/ family instructed to cont prior HEP.    Assessment     Pt would continue to benefit from skilled OT.     Santino demo'd increased ability to bear weight through the R UE with air cast keeping the arm relatively straight. She is also tolerating WB through the R UE well with wedge under palm to accommodate for decreased PROM in wrist extension. She tolerates stretching and WB better when distracted by music that she enjoys.     Santino is progressing well towards her goals and there are no updates to goals at this time. Pt prognosis is Fair.     Pt will continue to benefit from skilled " outpatient occupational therapy to address the deficits listed in the problem list on initial evaluation provide pt/family education and to maximize pt's level of independence in the home and community environment.     Anticipated barriers to occupational therapy: communication; history of inconsistent follow-through with HEP    Pt's spiritual, cultural and educational needs considered and pt agreeable to plan of care and goals.    Goals:  Short Term Goals: 4 weeks  (12/11/2019-1/8/2020)  1) Santino's family will be mod I w/ initial HEP. (progressing 1/31/2020)  2) Santino will tolerate progressive adjustments to new/modified orthoses (as appropriate) to improve thumb positioning, as well as to improve wrist support for crawling. (progressing 1/31/2020)  3) Wrist extension PROM will improve to 40* or better. (MET 1/29/2019)      Long Term Goals: 8 weeks (12/11/2019-2/5/2020)  1) Santino's family will be mod I w/ d/c HEP. (progressing 1/31/2020)  2) Santino's family will be mod I with orthotic wear and care. (progressing 1/31/2020)  3) Santino will be able to crawl the length of the mat x3 with crawling orthosis remaining in place and not slipping off. (progressing 1/31/2020)  4) Santino will demo 45-50* passive wrist extension to increase indep w/ crawling. (progressing 1/31/2020)    Plan     Continue stretching, WB., quadruped.    Arlene Mak, OT

## 2020-01-31 NOTE — PROGRESS NOTES
Physical Therapy Daily Treatment Note     Name: Santino Coxtcher  Clinic Number: [id number]    Therapy Diagnosis:   Encounter Diagnoses   Name Primary?    Muscle spasticity     Decreased functional mobility      Physician: Dieudonne Jones MD    Visit Date: 1/31/2020    Physician Orders: PT Eval and Treat   Medical Diagnosis from Referral: Muscle spasticity             Evaluation Date: 12/11/2019  Authorization Period Expiration: 12/31/2020  Plan of Care Expiration: 2/14/2020  Visit # / Visits authorized: 3/20    Time In: 1000  Time Out: 1055  Total Billable Time: 55 minutes    Precautions: Fall    Subjective     Pt reports: awake and alert post tx session with OT  She N/A compliant with home exercise program.  Response to previous treatment: No change  Functional change: none     Pain: 0/10  Location: Unable to determine due to communication challenges.      Objective     Santino received therapeutic exercises to develop strength and flexibility for 55 minutes including:   *Patient is already sitting EOM post OT tx session awaiting PTA for PT visit*  Passive hip flexor stretch @ EOM 3x30s R/L  Passive hip internal rotator stretch 3x30s R/L  Passive hamstring stretch 3x30s R/L  Bridge with bolster 3x10 (VC's to slow down)  Short arc quad 2x10 ea with max A  Lower trunk rotation 2x10 ea with D assist  Sit to stand x 5 max A for transfer, as well as for maintaining stand position ~ 1 min each time VC's to straighten hips/knees     Stand pivot transfer w/c >mat with max assist (patient does not achieve/maintain hip/knee extension during transfer).  Supine <> sit transfer max A for trunk control and LE management.      Home Exercises Provided and Patient Education Provided     Education provided:   - Demonstrated passive stretching to young male care giver that was present.      Written Home Exercises Provided: Not this date..  Exercises were reviewed with  Santino's mom.  Santino's mom verbalized fair  understanding  of the education provided.     See EMR under N/A for exercises provided N/A.    Assessment   Patient in a good mood with standing activities. Prior to and after stretching patient remains with hip and knee flexion in standing.  In supine while stretching right hamstring patients eyes both shifted her gaze to the right, she became silent (previously she was babbling and laughing) for short periods of time during the stretch, and it was JUST during R HS stretch.    Santino is not progressing well towards her goals.   Pt prognosis is Fair.     Pt will continue to benefit from skilled outpatient physical therapy to address the deficits listed in the problem list box on initial evaluation, provide pt/family education and to maximize pt's level of independence in the home and community environment.     Pt's spiritual, cultural and educational needs considered and pt agreeable to plan of care and goals.     Anticipated barriers to physical therapy: difficulty following instructions.      Goals:   Short Term Goals: Not met (2nd visit following evaluation)               1) Facilitate improved sitting posture with improved leveling of pelvis              2) Facilitate improved trunk ROM              3) Facilitate improved LE flexibility     Long Term Goals: Not met (2nd visit following evaluation)               1) Patient will sit with level pelvis              2) Improve hip ROM to facilitate decreased difficulty for caregiver to assist with cleaning              3) Facilitate improved LE flexibility to assist with transfers              4) Family/caregiver will be independent in stretching activities to facilitate carryover.      Plan     Progress stretching, strengthening as patient tolerates.  Progress to functional mobility/balance activities as patient flexibility allows.     Myah Meyer, PTA

## 2020-01-31 NOTE — PROGRESS NOTES
Physical Therapy Daily Treatment Note     Name: Santino Pompa  Clinic Number: [id number]    Therapy Diagnosis:   Encounter Diagnoses   Name Primary?    Muscle spasticity     Decreased functional mobility      Physician: Dieudonne Jones MD    Visit Date: 1/29/2020    Physician Orders: PT Eval and Treat   Medical Diagnosis from Referral: Muscle spasticity             Evaluation Date: 12/11/2019  Authorization Period Expiration: 12/31/2020  Plan of Care Expiration: 2/14/2020  Visit # / Visits authorized: 2/20    Time In: 0830  Time Out: 0900  Total Billable Time: 26 minutes    Precautions: Fall    Subjective     Pt reports: crying upon entering treatment area  She N/A compliant with home exercise program.  Response to previous treatment: eval only last visit  Functional change: none     Pain: 0/10  Location: Unable to determine due to communication challenges.      Objective     Santino received therapeutic exercises to develop strength and flexibility for 26 minutes including:   Passive hip flexor stretch 3x30s ea   Passive hip internal rotator stretch 3x30s B   Passive hamstring stretch 3x30s ea   Bridge with bolster 3x10   Short arc quad 2x10 ea  Stand pivot transfer w/c<>mat with max assist (patient does not achieve/maintain hip/knee extension during transfer).  Supine <> sit transfer max A for trunk control and LE management.      Home Exercises Provided and Patient Education Provided     Education provided:   - Demonstrated passive stretching to mom.      Written Home Exercises Provided: Not this date..  Exercises were reviewed with  Santino's mom.  Santino's mom verbalized fair  understanding of the education provided.     See EMR under N/A for exercises provided N/A.    Assessment     Fair tolerance for therapy activities.  Required encouragement to achieve participation in therapy session.  Significant flexibility deficits present this date.    Santino is not progressing well towards her goals.   Pt  prognosis is Fair.     Pt will continue to benefit from skilled outpatient physical therapy to address the deficits listed in the problem list box on initial evaluation, provide pt/family education and to maximize pt's level of independence in the home and community environment.     Pt's spiritual, cultural and educational needs considered and pt agreeable to plan of care and goals.     Anticipated barriers to physical therapy: difficulty following instructions.      Goals:   Short Term Goals: Not met (initial session following evaluation)               1) Facilitate improved sitting posture with improved leveling of pelvis              2) Facilitate improved trunk ROM              3) Facilitate improved LE flexibility     Long Term Goals: Not met (initial visit following evaluation)               1) Patient will sit with level pelvis              2) Improve hip ROM to facilitate decreased difficulty for caregiver to assist with cleaning              3) Facilitate improved LE flexibility to assist with transfers              4) Family/caregiver will be independent in stretching activities to facilitate carryover.      Plan     Progress stretching, strengthening as patient tolerates.  Progress to functional mobility/balance activities as patient flexibility allows.     Chelsea Beltran, PT

## 2020-02-03 ENCOUNTER — CLINICAL SUPPORT (OUTPATIENT)
Dept: REHABILITATION | Facility: HOSPITAL | Age: 25
End: 2020-02-03
Attending: PSYCHIATRY & NEUROLOGY
Payer: MEDICAID

## 2020-02-03 DIAGNOSIS — M62.838 MUSCLE SPASTICITY: Primary | ICD-10-CM

## 2020-02-03 DIAGNOSIS — M62.838 MUSCLE SPASTICITY: ICD-10-CM

## 2020-02-03 DIAGNOSIS — R26.89 DECREASED FUNCTIONAL MOBILITY: ICD-10-CM

## 2020-02-03 DIAGNOSIS — M25.631 STIFFNESS OF RIGHT WRIST JOINT: ICD-10-CM

## 2020-02-03 PROCEDURE — 97110 THERAPEUTIC EXERCISES: CPT | Mod: PN,CQ

## 2020-02-03 PROCEDURE — 97530 THERAPEUTIC ACTIVITIES: CPT | Mod: PN

## 2020-02-03 NOTE — PROGRESS NOTES
Physical Therapy Daily Treatment Note     Name: Santino Coxtcher  Clinic Number: [id number]    Therapy Diagnosis:   Encounter Diagnoses   Name Primary?    Muscle spasticity     Decreased functional mobility      Physician: Dieudonne Jones MD    Visit Date: 2/3/2020    Physician Orders: PT Eval and Treat   Medical Diagnosis from Referral: Muscle spasticity             Evaluation Date: 12/11/2019  Authorization Period Expiration: 12/31/2020  Plan of Care Expiration: 2/14/2020  Visit # / Visits authorized: 3/20    Time In: 1000  Time Out: 1055  Total Billable Time: 55 minutes    Precautions: Fall    Subjective     Pt reports: patient didn't report anything other than demonstrating thru actions her excitement over [employer] during therapy, which highly motivated her.  She N/A compliant with home exercise program.  Response to previous treatment: No change  Functional change: none     Pain: 0/10  Location: Unable to determine due to communication challenges.      Objective     Santino received therapeutic exercises to develop strength and flexibility for 55 minutes including:   *Patient is already sitting EOM post OT tx session awaiting PTA for PT visit with LE's on step stool*  Passive hip flexor stretch @ EOM 3x30s R/L  Passive hip internal rotator stretch in supine 3x30s R/L  Passive hamstring stretch 3x30s R/L  Bridge x 20 (VC's to slow down)  Lower trunk rotation long stretch, 3/30s R/L  Sit to stand x 5 max A for transfer, as well as for maintaining stand position ~ 1 min each time VC's to straighten hips/knees.  Wedge placed behind patient to encourage upright pelvic tilt with proper posture and A to hold shoulders back and VC's to hold head up. Having patient reach from L<>R at different heights, patient challenged and required mod/max A of additional person     Stand pivot transfer w/c >mat with max assist of 2  (patient does not achieve/maintain hip/knee extension during transfer).  Supine <>  sit transfer max A of 2 for trunk control and LE management.      Home Exercises Provided and Patient Education Provided     Education provided:   - Demonstrated passive stretching to young male care giver that was present.      Written Home Exercises Provided: Not this date..  Exercises were reviewed with  Santino's mom.  Santino's mom verbalized fair  understanding of the education provided.     See EMR under N/A for exercises provided N/A.    Assessment   Patient remained staring L/R when stretching hamstrings. At EOM working on WB on B LE/UE's with brace on L UE. Patient very happy today, dancing while standing and watching self in mirror, hugging therapists.  Difficulty maintaining upright pelvic posture while performing EOM activites    Santino is progressing fairlry towards her goals.   Pt prognosis is Fair.     Pt will continue to benefit from skilled outpatient physical therapy to address the deficits listed in the problem list box on initial evaluation, provide pt/family education and to maximize pt's level of independence in the home and community environment.     Pt's spiritual, cultural and educational needs considered and pt agreeable to plan of care and goals.     Anticipated barriers to physical therapy: difficulty following instructions.      Goals:   Short Term Goals: Not met (2nd visit following evaluation)               1) Facilitate improved sitting posture with improved leveling of pelvis (Cont progressing)              2) Facilitate improved trunk ROM (Cont progressing)              3) Facilitate improved LE flexibility( Cont progressing)     Long Term Goals: Not met (2nd visit following evaluation)               1) Patient will sit with level pelvis (Cont progressing)              2) Improve hip ROM to facilitate decreased difficulty for caregiver to assist with cleaning (Cont progressing)              3) Facilitate improved LE flexibility to assist with transfers (Cont progressing)              4)  Family/caregiver will be independent in stretching activities to facilitate carryover.   (Cont progressing)    Plan     Progress stretching, strengthening as patient tolerates.  Progress to functional mobility/balance activities as patient flexibility allows.     Myah Meyer, PTA

## 2020-02-03 NOTE — PROGRESS NOTES
Occupational Therapy Daily Treatment Note     Date: 2/3/2020  Name: Santino Pompa  Clinic Number: [id number]    Therapy Diagnosis:   Encounter Diagnoses   Name Primary?    Muscle spasticity Yes    Stiffness of right wrist joint      Physician: Dieudonne Jones MD    Physician Orders: Eval and Treat  Medical Diagnosis: muscle spasticity (due to CP)  Evaluation Date: 12/11/2019  Plan of Care Expiration Period: 2/5/2020  Insurance Authorization period Expiration: 12/31/2020  Date of Return to MD: Back to MD in early February; mom states they will get recommendations for orthotist at next MD appt.    Visit # / Visits Authorized: 5/20    Time In: 0915  Time Out: 1000  Total Billable Time: 45 minutes    Precautions:  Standard and Fall      Subjective     Brother (Alberto) is present again with Santino today. No concerns or functional change to report. Santino is able to say that the last time she used her crawling orthosis was last night.    Response to previous treatment: no concerns  Functional change: Nothing noted by brother     Pain: 0/10 at rest, based on FLACC, as well as Santino's denial of pain. She did no c/o pain during wrist stretching or WB today.     Objective     Santino participated in dynamic functional therapeutic activities to improve functional performance for 45 minutes, including:    -Santino had her crawling orthosis with her. It has cracks (at least 4) in the radial side of the palm  -Stand-pivot t/f w/c->mat w/ mod A x2   -OT removed Santino's edema glove and supination strap.  -Gentle mobilization of metacarpals, carpals, radius and ulna with stretching of the surrounding soft tissues to decrease edema and improve PROM, potentially allowing for increases in active movement.   -gentle stretching to digits for IP flexion, MCP flexion, composite flexion  -stretching wrist to extension; WB through R wrist to stretch to extension with L UE contralateral reaching while playing ball with a 2nd person.  "  -airsplint placed on R UE to help control elbow, keeping it in extension during crawling.  -sit->quadruped mod A. With air-cast and crawling orthosis, Santino crawled forward and backward w/ min A for positioning the R UE. She tends to internally rotate and pronate her arm, increasing the amount of pressure placed through the radial edge of the orthosis. She also participated in rolling the ball w/ the L UE while WB through the R UE.  -Kneeling up tall w/ assist of 2 x15s for quad stretching.  -quadruped->sit EOM w/ CGA  -at EOM, attempted to have Santino hold both arms in the air to "dance" to music. However, she had significant difficulty with controlling her core and was unable to remain upright without assist if both hands were in the air.   -Hugging (Santino likes to hug) with OT and with therapy tech throughout session with max encouragement and min A to use 2 arms, increasing active functional use of the R UE/ active movement in the shoulder.  -pt at EOM at end of session, awaiting PT w/ therapy tech and brother in room.    Home Exercises and Education Provided     Education provided:   - need to keep orthosis to provide patch. I don't recommend a new one at this time, as she needs one fabricated by an orthotist, with a heavier grade plastic than what I can provide. Alberto indicated that mom was OK with this.  -continue stretches at home.   - Progress towards goals     Written Home Exercises Provided: Patient/ family instructed to cont prior HEP.    Assessment     Pt would continue to benefit from skilled OT. Santino tolerated the air-splint with the crawling orthosis well and was able to increase her WB through the R UE with this set-up. However, she does require assist for repositioning the R UE during crawling and it tends to land in a pronated position, increasing the stress on the radial edge of the orthosis and leading to the observed cracks.    Santino is progressing well towards her goals and there are no " updates to goals at this time. Pt prognosis is Fair.     Pt will continue to benefit from skilled outpatient occupational therapy to address the deficits listed in the problem list on initial evaluation provide pt/family education and to maximize pt's level of independence in the home and community environment.     Anticipated barriers to occupational therapy: communication; history of inconsistent follow-through with HEP    Pt's spiritual, cultural and educational needs considered and pt agreeable to plan of care and goals.    Goals:  Short Term Goals: 4 weeks  (12/11/2019-1/8/2020)  1) Santino's family will be mod I w/ initial HEP. (progressing 2/3/2020)  2) Santino will tolerate progressive adjustments to new/modified orthoses (as appropriate) to improve thumb positioning, as well as to improve wrist support for crawling. (progressing 2/3/2020)  3) Wrist extension PROM will improve to 40* or better. (MET 1/29/2019)      Long Term Goals: 8 weeks (12/11/2019-2/5/2020)  1) Santino's family will be mod I w/ d/c HEP. (progressing 2/3/2020)  2) Santino's family will be mod I with orthotic wear and care. (progressing 2/3/2020)  3) Santino will be able to crawl the length of the mat x3 with crawling orthosis remaining in place and not slipping off. (progressing 2/3/2020)  4) Richarry will demo 45-50* passive wrist extension to increase indep w/ crawling. (progressing 2/3/2020)    Plan     Continue stretching, WB., quadruped. Patch to orthosis.    Arlene Mak, OT

## 2020-02-05 ENCOUNTER — TELEPHONE (OUTPATIENT)
Dept: ORTHOPEDICS | Facility: CLINIC | Age: 25
End: 2020-02-05

## 2020-02-05 ENCOUNTER — CLINICAL SUPPORT (OUTPATIENT)
Dept: REHABILITATION | Facility: HOSPITAL | Age: 25
End: 2020-02-05
Attending: PSYCHIATRY & NEUROLOGY
Payer: MEDICAID

## 2020-02-05 DIAGNOSIS — R26.89 DECREASED FUNCTIONAL MOBILITY: ICD-10-CM

## 2020-02-05 DIAGNOSIS — M62.838 MUSCLE SPASTICITY: ICD-10-CM

## 2020-02-05 DIAGNOSIS — M25.631 STIFFNESS OF RIGHT WRIST JOINT: ICD-10-CM

## 2020-02-05 DIAGNOSIS — M62.838 MUSCLE SPASTICITY: Primary | ICD-10-CM

## 2020-02-05 PROCEDURE — 97112 NEUROMUSCULAR REEDUCATION: CPT | Mod: PN

## 2020-02-05 PROCEDURE — 97110 THERAPEUTIC EXERCISES: CPT | Mod: PN

## 2020-02-05 PROCEDURE — 97530 THERAPEUTIC ACTIVITIES: CPT | Mod: PN

## 2020-02-05 NOTE — TELEPHONE ENCOUNTER
"----- Message from Chioma Gutierrez MA sent at 2/5/2020  3:34 PM CST -----  Contact: mother, sandra dietrich   Mother looking for new Dr to follow pt.   Needs new "AFO" and hand braces, thumb everardo   Call    "

## 2020-02-05 NOTE — TELEPHONE ENCOUNTER
Returned call to patient's mother at requested call back phone number  with no answer and left voicemail for patient's mother to call back office at phone number [phone] to schedule an appointment with Dr. Estrella.

## 2020-02-06 ENCOUNTER — TELEPHONE (OUTPATIENT)
Dept: ORTHOPEDICS | Facility: CLINIC | Age: 25
End: 2020-02-06

## 2020-02-06 NOTE — TELEPHONE ENCOUNTER
Returned call to patient's mother at phone number [phone] and left voicemail for patient's mother to call back office at phone number [phone].

## 2020-02-06 NOTE — TELEPHONE ENCOUNTER
----- Message from Romy Lema sent at 2/5/2020  5:02 PM CST -----  Contact: pt  Pt care giver called to see if possible pt could be seen sooner than April     Pt can be reached [phone]

## 2020-02-06 NOTE — PLAN OF CARE
"  Outpatient Therapy Updated Plan of Care     Visit Date: 2/5/2020    Name: Santino Pompa  Clinic Number: [id number]    Therapy Diagnosis:   Encounter Diagnoses   Name Primary?    Muscle spasticity Yes    Stiffness of right wrist joint      Physician: Dieudonne Jones MD    Physician Orders: Eval and Treat  Medical Diagnosis: muscle spasticity (due to CP)  Evaluation Date: 12/11/2019  Plan of Care Expiration Period: 2/5/2020  Insurance Authorization period Expiration: 12/31/2020  Date of Return to MD: Back to MD in early February; mom stated they will get recommendations for orthotist at next MD appt.     Visit # / Visits Authorized: 6/20  Precautions: Standard, Fall, seizure  Functional Level Prior to Evaluation:  At start of care, mom reported decreased incorporation of the R UE during crawling. Santino was still using the crawling orthosis fabricated by this OT in 2017, but that Santino's hand was slipping out of the crawling orthosis and there were cracks in it. Mom reported increased thumb adduction and difficulty in opening Santino's R hand. She also noted that Santino "is not bending the R arm anymore."     Subjective     Update: Neither mom, caregiver, nor brother report any functional change at home. They (and Santino) do report consistent use of the crawling orthosis at home.    Objective     Update:   AROM R sh abd=75 (almost all attempts at raising the R UE result in abduction)  AROM R sh flex=83 (when she is instructed to reach "across", she also laterally flexes the trunk to the left to achieve shoulder flexion)  PROM R sh flex=107  PROM R elbow tirgjme=852  AROM R elbow flexion=0  PROM R wrist ext=35  PROM R wrist flex=65    Crawling orthosis has been repaired and sent home with Santino. A good alternate to her current thumb orthosis does not appear readily available over the counter and a full thermoplastic orthosis would be too limiting and would likely cause skin integrity concerns. However, at next " session, we will measure the thumb and hand and see if a smaller size of her current orthosis would fit better and therefore provide improved support.     Santino is able to crawl with the crawling orthosis and air-splint to R elbow w/ min A for repositioning the R arm. She is also able to crawl with only the crawling orthosis by shifting her weight back and left and stabilizing through the R UE, though she does put increased weight through the radial border of the palm due to over-pronation.    Assessment     Update: At this point, Santino has demonstrated progress toward all goals. She had met STG #3 on 1/29/20, but today demo's decreased PROM in wrist extension. Regardless, she demonstrates improved AROM and PROM in the R shoulder, as well as improved PROM in the R wrist. She has demonstrated the ability to crawl the length of the mat x1 with the crawling orthosis remaining in place (after repairs). The family is using the orthosis at home and it is felt that Santino can continue to benfeit from skilled OT to increase PROM wrist extension and active R shoulder control to increase her independence with crawling at home, increasing her active interaction with her environment.    Previous Goals Status:    Short Term Goals: 4 weeks  (12/11/2019-1/8/2020)  1) Santino's family will be mod I w/ initial HEP. (progressing 2/5/2020)  2) Santino will tolerate progressive adjustments to new/modified orthoses (as appropriate) to improve thumb positioning, as well as to improve wrist support for crawling. (progressing 2/5/2020)  3) Wrist extension PROM will improve to 40* or better. (progressing 2/5/2020)      Long Term Goals: 8 weeks (12/11/2019-2/5/2020)  1) Santino's family will be mod I w/ d/c HEP. (progressing 2/5/2020)  2) Santino's family will be mod I with orthotic wear and care. (progressing 2/5/2020)  3) Santino will be able to crawl the length of the mat x3 with crawling orthosis remaining in place and not slipping off.  (progressing 2/5/2020)  4) Santino will demo 45-50* passive wrist extension to increase indep w/ crawling. (progressing 2/5/2020)    Long Term Goal Status:   continue per initial plan of care.    Reasons for Recertification of Therapy:   Santino demonstrates progress toward all goals although she has only had 6 therapy appointments due to delay in starting treatment as we were awaiting approval from her insurance company. Recommend continued therapy so we can work toward goals, ensure good fit of crawling orthosis, find a suitable alternative to her current thumb orthosis and increase her ability to bear weight through the R UE for increased indep w/ crawling, allowing for increased interaction with her home environment.    Plan     Updated Certification Period: 2/5/2020 to 4/1/2020  Recommended Treatment Plan: 2 times per week for 8 weeks: Manual Therapy, Moist Heat/ Ice, Neuromuscular Re-ed, Orthotic Management and Training, Patient Education, Self Care, Therapeutic Activites and Therapeutic Exercise    Arlene Mak, OT  2/5/2020      I CERTIFY THE NEED FOR THESE SERVICES FURNISHED UNDER THIS PLAN OF TREATMENT AND WHILE UNDER MY CARE    Physician's comments:        Physician's Signature: ___________________________________________________

## 2020-02-07 NOTE — PROGRESS NOTES
"  Physical Therapy Daily Treatment Note     Name: Santino Pompa  Clinic Number: [id number]    Therapy Diagnosis:   Encounter Diagnoses   Name Primary?    Muscle spasticity     Decreased functional mobility      Physician: Dieudonne Jones MD    Visit Date: 2/5/2020    Physician Orders: PT Eval and Treat   Medical Diagnosis from Referral: Muscle spasticity             Evaluation Date: 12/11/2019  Authorization Period Expiration: 12/31/2020  Plan of Care Expiration: 2/14/2020  Visit # / Visits authorized: 5/20    Time In: 1010  Time Out: 1050  Total Billable Time: 38 minutes    Precautions: Fall    Subjective     Pt reports: "Hey".  Patient laughing and giggling throughout session.    She was not compliant with home exercise program.  Response to previous treatment: No reported difficulties  Functional change: N/A this date.     Pain:  Facial grimacing noted during stretching but patient unable to verbalize pain level due to communication difficulties.      Objective     Santino received therapeutic exercises to develop strength and flexibility for 28 minutes including:   Supine hip flexor stretch 3x30s ea with manual assistance for positioning and stretch    Manual hamstring stretch 3x30s ea    Manual stretch into hip external rotation 3x30s ea    Manual butterfly stretch 3x30s B   Bridging over bolster 2x10 with cues for controlled movement   Short arc quads 2x10 with tactile cues to distal quads to facilitate appropriate movement.      Santino participated in neuromuscular re-education activities to improve: Balance and Posture for 10 minutes. The following activities were included:   Sitting balance on edge of mat x 3 min   Rolling supine <> prone x 2 with mod - max assist   Quadruped x 1' with mod - max assist   Tall kneeling x 4 min with mod to max assist to assume position and mod assist to maintain     Home Exercises Provided and Patient Education Provided     Education provided:   - Demonstrated to " brother positioning for stretching activities.      Written Home Exercises Provided: Not this date. .  Exercises were reviewed and Santino was not able to demonstrate them prior to the end of the session.  Santino demonstrated poor understanding of the education provided.     See EMR under N/A for exercises provided N/A.    Assessment     Patient demonstrated better tolerance for stretching activities today.  She assisted with bed mobility and transfers.    Santino is progressing fairly well towards her goals.   Pt prognosis is Fair.     Pt will continue to benefit from skilled outpatient physical therapy to address the deficits listed in the problem list box on initial evaluation, provide pt/family education and to maximize pt's level of independence in the home and community environment.     Pt's spiritual, cultural and educational needs considered and pt agreeable to plan of care and goals.     Anticipated barriers to physical therapy: difficulty following instructions    Goals: 2/5/2020  Short Term Goals:              1) Facilitate improved sitting posture with improved leveling of pelvis  Initiated              2) Facilitate improved trunk ROM  Not met              3) Facilitate improved LE flexibility  Progressing     Long Term Goals:               1) Patient will sit with level pelvis  Initiated              2) Improve hip ROM to facilitate decreased difficulty for caregiver to assist with cleaning  Progressing              3) Facilitate improved LE flexibility to assist with transfers  Progressing              4) Family/caregiver will be independent in stretching activities to facilitate carryover.  Not met    Plan     Progress stretching and strengthening as patient tolerates.  Progress to increased balance and transfer training as endurance improves    Chelsea Beltran, PT

## 2020-02-11 ENCOUNTER — CLINICAL SUPPORT (OUTPATIENT)
Dept: REHABILITATION | Facility: HOSPITAL | Age: 25
End: 2020-02-11
Attending: PSYCHIATRY & NEUROLOGY
Payer: MEDICAID

## 2020-02-11 DIAGNOSIS — M62.838 MUSCLE SPASTICITY: ICD-10-CM

## 2020-02-11 DIAGNOSIS — R26.89 DECREASED FUNCTIONAL MOBILITY: ICD-10-CM

## 2020-02-11 PROCEDURE — 97110 THERAPEUTIC EXERCISES: CPT | Mod: PN,CQ

## 2020-02-11 NOTE — PROGRESS NOTES
Physical Therapy Daily Treatment Note     Name: Santino Coxtcher  Clinic Number: [id number]    Therapy Diagnosis:   Encounter Diagnoses   Name Primary?    Muscle spasticity     Decreased functional mobility      Physician: Dieudonne Jones MD    Visit Date: 2/11/2020    Physician Orders: PT Eval and Treat   Medical Diagnosis from Referral: Muscle spasticity             Evaluation Date: 12/11/2019  Authorization Period Expiration: 12/31/2020  Plan of Care Expiration: 2/14/2020  Visit # / Visits authorized: 6/20    Time In: 0900  Time Out: 0958  Total Billable Time: 58 minutes    Precautions: Fall    Subjective     Pt reports: patient didn't report anything other than demonstrating thru actions her excitement over [employer] during therapy, which highly motivated her.  She N/A compliant with home exercise program.  Response to previous treatment: No change  Functional change: none     Pain: 0/10  Location: Unable to determine due to communication challenges.      Objective     Santino received therapeutic exercises to develop strength and flexibility for 58 minutes including:    Passive hip flexor stretch in supine 3x30s R/L  Passive hip internal rotator stretch in supine 3x30s R/L  Passive hamstring stretch in supine 3x30s R/L  Passive single knee to chest 3 x 30s R/L  Bridge x 20 (VC's to slow down)  Lower trunk rotation long stretch, 3/30s R/L  Sit to stand x 5 max A for transfer, as well as for maintaining stand position ~ 1 min each time VC's to straighten hips/knees.  Wedge placed behind patient to encourage upright pelvic tilt x 5' with proper posture and max A to hold shoulders back and VC's to hold head up. Having patient reach from L<>R at different heights, patient challenged and required mod/max A of additional person     Stand pivot transfer w/c >mat with max assist of 2  (patient does not achieve/maintain hip/knee extension during transfer).  Supine <> sit transfer max A of 2 for trunk  control and LE management.      Home Exercises Provided and Patient Education Provided     Education provided:   - Demonstrated passive stretching to young male care giver that was present.      Written Home Exercises Provided: Not this date..  Exercises were reviewed with  Santino's mom.  Santino's mom verbalized fair  understanding of the education provided.     See EMR under N/A for exercises provided N/A.    Assessment   Patient remained with difficulty sitting in anterior pelvic tilt with wedges behind her, as she tends to sacral sit. When stretched patient tends to veer off looking to the R and becoming quiet.  2 episodes of extensor tone with SKTC stretch, but after given time to relax, patient was able to tolerate cont'd stretch with decreased tone reaction    Santino is progressing fairlry towards her goals.   Pt prognosis is Fair.     Pt will continue to benefit from skilled outpatient physical therapy to address the deficits listed in the problem list box on initial evaluation, provide pt/family education and to maximize pt's level of independence in the home and community environment.     Pt's spiritual, cultural and educational needs considered and pt agreeable to plan of care and goals.     Anticipated barriers to physical therapy: difficulty following instructions.      Goals:   Short Term Goals: Not met (2nd visit following evaluation)               1) Facilitate improved sitting posture with improved leveling of pelvis (Cont progressing)              2) Facilitate improved trunk ROM (Cont progressing)              3) Facilitate improved LE flexibility( Cont progressing)     Long Term Goals: Not met (2nd visit following evaluation)               1) Patient will sit with level pelvis (Cont progressing)              2) Improve hip ROM to facilitate decreased difficulty for caregiver to assist with cleaning (Cont progressing)              3) Facilitate improved LE flexibility to assist with transfers (Cont  progressing)              4) Family/caregiver will be independent in stretching activities to facilitate carryover.   (Cont progressing)    Plan     Progress stretching, strengthening as patient tolerates.  Progress to functional mobility/balance activities as patient flexibility allows.     Myah Meyer, PTA

## 2020-02-13 DIAGNOSIS — M79.641 RIGHT HAND PAIN: Primary | ICD-10-CM

## 2020-02-14 ENCOUNTER — OFFICE VISIT (OUTPATIENT)
Dept: ORTHOPEDICS | Facility: CLINIC | Age: 25
End: 2020-02-14
Payer: MEDICAID

## 2020-02-14 ENCOUNTER — CLINICAL SUPPORT (OUTPATIENT)
Dept: REHABILITATION | Facility: HOSPITAL | Age: 25
End: 2020-02-14
Attending: PSYCHIATRY & NEUROLOGY
Payer: MEDICAID

## 2020-02-14 ENCOUNTER — HOSPITAL ENCOUNTER (OUTPATIENT)
Dept: RADIOLOGY | Facility: HOSPITAL | Age: 25
Discharge: HOME | End: 2020-02-14
Attending: ORTHOPAEDIC SURGERY
Payer: MEDICAID

## 2020-02-14 ENCOUNTER — TELEPHONE (OUTPATIENT)
Dept: ORTHOPEDICS | Facility: CLINIC | Age: 25
End: 2020-02-14

## 2020-02-14 VITALS — HEART RATE: 76 BPM | HEIGHT: 60 IN | BODY MASS INDEX: 28.48 KG/M2 | WEIGHT: 145.06 LBS | RESPIRATION RATE: 16 BRPM

## 2020-02-14 DIAGNOSIS — R26.89 DECREASED FUNCTIONAL MOBILITY: ICD-10-CM

## 2020-02-14 DIAGNOSIS — M25.631 STIFF WRIST, RIGHT: Primary | ICD-10-CM

## 2020-02-14 DIAGNOSIS — G80.2 SPASTIC HEMIPLEGIC CEREBRAL PALSY: ICD-10-CM

## 2020-02-14 DIAGNOSIS — M62.838 MUSCLE SPASTICITY: ICD-10-CM

## 2020-02-14 DIAGNOSIS — M25.621 STIFF ELBOW, RIGHT: ICD-10-CM

## 2020-02-14 DIAGNOSIS — M79.641 RIGHT HAND PAIN: ICD-10-CM

## 2020-02-14 PROBLEM — M25.632: Status: ACTIVE | Noted: 2020-02-14

## 2020-02-14 PROCEDURE — 99203 OFFICE O/P NEW LOW 30 MIN: CPT | Mod: S$PBB,,, | Performed by: ORTHOPAEDIC SURGERY

## 2020-02-14 PROCEDURE — 99213 OFFICE O/P EST LOW 20 MIN: CPT | Mod: PBBFAC,25,PN | Performed by: ORTHOPAEDIC SURGERY

## 2020-02-14 PROCEDURE — 73120 X-RAY EXAM OF HAND: CPT | Mod: TC,PN,RT

## 2020-02-14 PROCEDURE — 73120 X-RAY EXAM OF HAND: CPT | Mod: 26,RT,, | Performed by: RADIOLOGY

## 2020-02-14 PROCEDURE — 97110 THERAPEUTIC EXERCISES: CPT | Mod: PN,CQ

## 2020-02-14 PROCEDURE — 73120 XR HAND 2 VIEW RIGHT: ICD-10-PCS | Mod: 26,RT,, | Performed by: RADIOLOGY

## 2020-02-14 PROCEDURE — 99999 PR PBB SHADOW E&M-EST. PATIENT-LVL III: CPT | Mod: PBBFAC,,, | Performed by: ORTHOPAEDIC SURGERY

## 2020-02-14 PROCEDURE — 99203 PR OFFICE/OUTPT VISIT, NEW, LEVL III, 30-44 MIN: ICD-10-PCS | Mod: S$PBB,,, | Performed by: ORTHOPAEDIC SURGERY

## 2020-02-14 PROCEDURE — 99999 PR PBB SHADOW E&M-EST. PATIENT-LVL III: ICD-10-PCS | Mod: PBBFAC,,, | Performed by: ORTHOPAEDIC SURGERY

## 2020-02-14 NOTE — PROGRESS NOTES
Physical Therapy Daily Treatment Note     Name: Santino Coxtcher  Clinic Number: [id number]    Therapy Diagnosis:   Encounter Diagnoses   Name Primary?    Muscle spasticity     Decreased functional mobility      Physician: Dieudonne Jones MD    Visit Date: 2/14/2020    Physician Orders: PT Eval and Treat   Medical Diagnosis from Referral: Muscle spasticity             Evaluation Date: 12/11/2019  Authorization Period Expiration: 12/31/2020  Plan of Care Expiration: 2/14/2020  Visit # / Visits authorized: 7/20    Time In: 0910  Time Out: 0945  Total Billable Time: 35 minutes    Precautions: Fall    Subjective     Pt reports: No complaints, pleasant upon arrival, smiling and dancing to [employer]  She N/A compliant with home exercise program.  Response to previous treatment: No change  Functional change: none     Pain: 0/10  Location: Unable to determine due to communication challenges.      Objective     Santino received therapeutic exercises to develop strength and flexibility for 58 minutes including:    Passive hip flexor stretch in supine 3x30s R/L  Passive hip internal rotator stretch in supine 3x30s R/L  Passive hamstring stretch in supine 3x30s R/L  Passive single knee to chest 3 x 30s R/L  Bridge x 10 (VC's to slow down)  Lower trunk rotation long stretch, 3/30s R/L  Sit to stand x 5 max A for transfer, as well as for maintaining stand position ~ 1 min each time VC's to straighten hips/knees.  Wedge placed behind patient to encourage upright pelvic tilt x 5' with proper posture and max A to hold shoulders back and VC's to hold head up. Having patient reach from L<>R at different heights, patient challenged and required mod/max A of additional person     Stand pivot transfer w/c >mat with max assist of 2  (patient does not achieve/maintain hip/knee extension during transfer).  Supine <> sit transfer max A of 2 for trunk control and LE management.      Home Exercises Provided and Patient  Education Provided     Education provided:   - Demonstrated passive stretching to young male care giver that was present.      Written Home Exercises Provided: Not this date..      Assessment   Patient remained with difficulty sitting in anterior pelvic tilt with wedges behind her, as she tends to sacral sit. Patient required max A to maintain upright sitting.  Reaching for objects and crossing midline with upper body support and place without A.    Ambry is progressing fairlry towards her goals.   Pt prognosis is Fair.     Pt will continue to benefit from skilled outpatient physical therapy to address the deficits listed in the problem list box on initial evaluation, provide pt/family education and to maximize pt's level of independence in the home and community environment.     Pt's spiritual, cultural and educational needs considered and pt agreeable to plan of care and goals.     Anticipated barriers to physical therapy: difficulty following instructions.      Goals:   Short Term Goals: Not met (2nd visit following evaluation)               1) Facilitate improved sitting posture with improved leveling of pelvis (Cont progressing)              2) Facilitate improved trunk ROM (Cont progressing)              3) Facilitate improved LE flexibility( Cont progressing)     Long Term Goals: Not met (2nd visit following evaluation)               1) Patient will sit with level pelvis (Cont progressing)              2) Improve hip ROM to facilitate decreased difficulty for caregiver to assist with cleaning (Cont progressing)              3) Facilitate improved LE flexibility to assist with transfers (Cont progressing)              4) Family/caregiver will be independent in stretching activities to facilitate carryover.   (Cont progressing)    Plan     Patients treatment transferred to Chelsea Beltran for POC update.     Myah Meyer, LISSETT

## 2020-02-14 NOTE — TELEPHONE ENCOUNTER
Verbal referral completed with Naya with Dr. Jackson's office at [facility]. Faxed Naya office note from today, xray report from today, referral, and demographics. Naya will contact patient to schedule an appointment with patient's mother.         [facility] Phone: [phone]  [facility] Fax: [phone]

## 2020-02-14 NOTE — PROGRESS NOTES
Subjective:      Patient ID: Santino Pompa is a 25 y.o. female.    Chief Complaint: Pain of the Right Hand    Referring Provider: Aaareferral Self  No address on file    HPI:  Ms. Pompa is a 25-year-old left-hand dominant lady who presented today for evaluation of right upper extremity contractures/stiffness.  The patient has profound cerebral palsy and also hydrocephalus of which she has a right-sided hemiplegia.  She had tendon transfers completed on her right upper extremity in 2016 and has noticed that over the last 2 years is been getting progressive stiffness and contractures of her right upper extremity again.  She has been doing physical therapy with help.  She gets Botox injections approximately every 3 months through her neurologist which improves her symptoms until the Botox wears off.  She has been wearing braces but the braces are worn out. At this point she is having issues with positioning her arms and space due to the progressive stiffness.    Past Medical History:   Diagnosis Date    Anticoagulant long-term use     Blood transfusion     Hydrocephalus shunted twice    * Hemiplegia cerebral palsy right side    PDA (patent ductus arteriosus) At birth   * Visually impaired     *  * Seizures  Crohn's  Developmental delay      Past Surgical History:   Procedure Laterality Date    BRAIN SURGERY shunt placement twice      CARDIAC SURGERY patent ductus closure       *  *  *  * EYE SURGERY I realignment  COLONOSCOPY  TENDON LENGTHENING BILATERAL HEELS  TENDON LENGTHENING RIGHT UPPER EXTREMITY  HAMSTRING RECESS BILATERAL LOWER EXTREMITY         Review of patient's allergies indicates:   Allergen Reactions    Latex, natural rubber Swelling    Demerol [meperidine]        Social History     Occupational History    Disabled   Tobacco Use    Smoking status: Never Smoker    Smokeless tobacco: Never Used   Substance and Sexual Activity    Alcohol use: No    Drug use: No    Sexual  activity: Never      Family History   Problem Relation Age of Onset    Hypertension Maternal Grandmother     COPD Maternal Grandfather     Hypertension Maternal Grandfather     Hypertension Paternal Grandmother     Kidney disease Paternal Grandfather     Hypertension Father        Previous Hospitalizations:  Shunts, tendon lengthening    ROS:   Review of Systems   Constitution: Negative for chills and fever.   HENT: Negative for congestion.    Eyes: Positive for visual disturbance.   Cardiovascular: Negative for chest pain.   Respiratory: Negative for cough.    Endocrine: Negative for polydipsia.   Hematologic/Lymphatic: Negative for adenopathy.   Skin: Negative for flushing and itching.   Musculoskeletal: Positive for muscle cramps and myalgias. Negative for gout.   Gastrointestinal: Negative for constipation, diarrhea and heartburn.   Genitourinary: Negative for nocturia.   Neurological: Positive for seizures. Negative for headaches.   Psychiatric/Behavioral: Negative for depression and substance abuse. The patient is not nervous/anxious.    Allergic/Immunologic: Negative for environmental allergies.           Objective:      Physical Exam:   General:  Unable to answer about person place or time..  No acute distress  HEENT: Normocephalic, PEARLA EOMI.  Poor dentition  Neck: Supple, No JVD  Chest: Symetric, equal excursion on inspiration  Abdomen: Soft NTND  Vascular:  Pulses intact and equal bilaterally.  Capillary refill less than 3 seconds and equal bilaterally  Neurologic:  Pinprick and soft touch intact and equal bilaterally  Integment:  No ecchymosis, no errythema  Extremity:  Wrist:  Pronation/supination left wrist 75/75 degrees, right wrist 45/45 degrees. Dorsiflexion/volar flexion left wrist 55/55 degrees, right wrist 15/15 degrees. Nontender with palpation.  Relatively no swelling. Relatively nontender with motion.  Finger stiff right hand.                      Elbow:  Extension/flexion right elbow  0/5 degrees. Extension/flexion left elbow 0/90 degrees. Radial/ulna stressing equal bilaterally with endpoint.  Nontender with palpation both elbows.  Nontender with attempts at motion both elbows.  Radiography:  Personally reviewed x-rays of the right hand completed on 02/14/2020 showed no fracture dislocation.      Assessment:       Impression:      1. Arthrofibrosis right elbow.   2. Arthrofibrosis right wrist.   3. Spastic hemiplegic cerebral palsy          Plan:       1.  Discussed physical examination and radiographic findings with the patient. Santino understands that she has spastic hemiplegic cerebral palsy and is developing arthrofibrosis of her elbows and wrists.  Discussed with the patient's mother that this patient's issues exceed the capabilities of this office and she will need to be seen by a more specialized surgeon.  2.  Refer to upper extremity surgery.  3.  Any pain can be treated with over-the-counter medications dosed per box instructions.  4.  Continue with physical therapy.  5.  Continue with current braces.  6.  [facility] portal was discussed with the patient and information given.  The patient was encouraged to use the portal for future encounters.  7.  Follow up p.r.n..

## 2020-02-19 NOTE — PLAN OF CARE
Outpatient Therapy Updated Plan of Care     Visit Date: 2/14/2020    Name: Santino Pompa  Clinic Number: [id number]    Therapy Diagnosis:   Encounter Diagnoses   Name Primary?    Muscle spasticity     Decreased functional mobility      Physician: Dieudonne Jones MD    Physician Orders: PT Eval and Treat   Medical Diagnosis from Referral: Muscle spasticity             Evaluation Date: 12/11/2019  Current Certification Period:  12/11/2019 to 2/14/2020.  Authorization Period Expiration: 12/31/2020  Plan of Care Expiration: 2/14/2020  Visit # / Visits authorized: 7/20    Precautions: Fall and skin (pressure and friction)  Functional Level Prior to Evaluation: Dependent in self care activities.  Assists somewhat with stand pivot transfers.  Wheelchair dependent for mobility.      Subjective     Update: Patient intermittently indicates discomfort with stretching/positioning utilizing facial expressions, groans but does not verbalize specific pain location, intensity, etc.     Objective     Update:   Posture: Sitting: Pelvic obliquity persists with R side of pelvis higher than L.  R shoulder  somewhat protracted, flaring of L mid-lower ribs, weight shifted toward L.  Standing: requires assistance, flexion at hips and knees.     Range of Motion/Strength:   Hip   Right     Left   Pain/Dysfunction with Movement     AROM PROM MMT AROM PROM MMT     Flexion  from   5*-50*  100*  2/5  0* to 70*  90*  2+/5      Extension  N/T  --2*  2/5  N/T  5*  2/5  based on functional movement   Abduction  N/T  20*  1/5  N/T  25*  2-/5     Adduction  N/T  N/T  N/T  N/T  N/T  N/T     Internal rotation  N/T  50*  1/5  N/T  50*  1/5     External rotation  N/T  40*  2-/5  N/T  55*   2-/5        Knee   Right     Left   Pain/Dysfunction with Movement     AROM PROM MMT AROM PROM MMT     Flexion  N/T  130*  2-/5  N/T  135*  2-/5     Extension  N/T  -36*  1/5  N/T  -31*  1/5        Ankle   Right     Left   Pain/Dysfunction with Movement      AROM PROM MMT AROM PROM MMT     Plantarflexion  N/T  25*  0/5  N/T  15*  0/5     Dorsiflexion  N/T  -5*  0/5  N/T  0*  0/5     Inversion                 Eversion                 Unable to fully assess active ROM due to patient's limited ability to follow instructions and perform AROM     Flexibility: Hamstring length R 128*, L 130*.  Significant limitations persist in hip flexors, hip rotators, and calves.    Gait: Nonambulatory:  Patient is able to take 2 steps forward with assistance to maintain upright position and cues to shift weight.   Bed Mobility: Able to roll on plinth to right and to left with minimal to moderate assistance.  Mod assistance for rolling to prone and moderate to max assistance to returni to supine.  Moderate assistance for scooting laterally. Moderate to maximal assistance for scooting up.    Transfers: Assistance - max assistance required for sit <> stand and stand pivot transfers.  Patients assists with forward weight sihift, extending hips and knees to achieve erect postition, and move feet.  Verbal cues and minimal assistance for weight shift needed.  Verbal cues  to advance each foot.    Special Tests: Balance:  Static sitting: able to maintain sitting balance on level surface without balance challenges.  Dynamic: able to maintain balance with minimal to moderate external support while reaching laterally.  Standing:  Requires assistance to achieve upright standing position but is unable to achieve position without assistance. Is unable to maintain upright position without external support.  Unable to maintain dynamic standing balance without external support.      Assessment     Update: Patient is making slow progress in PT with improved LE flexibility, improved balance in sitting and improved bed mobility; however, she continues to demonstrate significant limitation in LE ROM, LE strength, and functional mobility.  She should benefit from continued skilled PT services to address  remaining deficits.      Previous Short Term Goals Status:      1) Facilitate improved sitting posture with improved leveling of pelvis  Minimal progress              2) Facilitate improved trunk ROM  Not met              3) Facilitate improved LE flexibility Progressing      New Short Term Goals Status:  2/14/2020   1) Facilitate improved sitting posture with improved leveling of pelvis  Continue              2) Facilitate improved trunk ROM  Continue              3) Facilitate improved LE flexibility  Continue    Long Term Goal Status:   continue per initial plan of care.              1) Patient will sit with level pelvis  Minimal progress              2) Improve hip ROM to facilitate decreased difficulty for caregiver to assist with cleaning  Progressing              3) Facilitate improved LE flexibility to assist with transfers  Progressing              4) Family/caregiver will be independent in stretching activities to facilitate carryover.  Not yet addressed    Reasons for Recertification of Therapy:   Patient is making fair progress in PT with improvement in LE ROM LE flexibility, LE strength and bed mobility.  However, she continues to demonstrate limitations in each area which contributes to impairment in functional mobility and the need for caregiver support.  She should benefit from continues skilled PT services to address deficits and thus improve function.      Plan     Updated Certification Period: 2/14/2020 to 4/24/2020  Recommended Treatment Plan: 2 times per week for 8 weeks: Manual Therapy, Neuromuscular Re-ed, Patient Education, Therapeutic Activites and Therapeutic Exercise Incorporate trunk ROM/stretching activities      Chelsea Beltran, PT  2/14/2020      I CERTIFY THE NEED FOR THESE SERVICES FURNISHED UNDER THIS PLAN OF TREATMENT AND WHILE UNDER MY CARE    Physician's comments:        Physician's Signature: ___________________________________________________

## 2020-03-05 ENCOUNTER — CLINICAL SUPPORT (OUTPATIENT)
Dept: REHABILITATION | Facility: HOSPITAL | Age: 25
End: 2020-03-05
Attending: PSYCHIATRY & NEUROLOGY
Payer: MEDICAID

## 2020-03-05 DIAGNOSIS — M62.838 MUSCLE SPASTICITY: ICD-10-CM

## 2020-03-05 DIAGNOSIS — M25.631 STIFFNESS OF RIGHT WRIST JOINT: ICD-10-CM

## 2020-03-05 DIAGNOSIS — R26.89 DECREASED FUNCTIONAL MOBILITY: ICD-10-CM

## 2020-03-05 DIAGNOSIS — R26.89 DECREASED FUNCTIONAL MOBILITY: Primary | ICD-10-CM

## 2020-03-05 PROCEDURE — 97530 THERAPEUTIC ACTIVITIES: CPT | Mod: PN

## 2020-03-05 PROCEDURE — 97110 THERAPEUTIC EXERCISES: CPT | Mod: PN,CQ

## 2020-03-05 NOTE — PROGRESS NOTES
Occupational Therapy Daily Treatment Note     Date: 3/5/2020  Name: Santino Pompa  Clinic Number: [id number]    Therapy Diagnosis:   Encounter Diagnoses   Name Primary?    Decreased functional mobility Yes    Stiffness of right wrist joint      Physician: Dieudonne Jones MD    Physician Orders: Eval and Treat  Medical Diagnosis: muscle spasticity (due to CP)  Evaluation Date: 12/11/2019  Plan of Care Expiration Period: 2/5/2020  Insurance Authorization period Expiration: 12/31/2020  Date of Return to MD: Back to MD in early February; mom stated they will get recommendations for orthotist at next MD appt.    Visit # / Visits Authorized: 7/20    Time In: 0915  Time Out: 1000  Total Billable Time: 45 minutes    Precautions:  Standard and Fall      Subjective     Brother (Alberto) is present again with Santino today. No concerns or functional change to report.   Response to previous treatment: no concerns. Alberto can not say whether new patches in crawling orthosis have held up.  Functional change: Nothing noted by brother     Pain: 0/10 at rest, based on FLACC, as well as Santino's denial of pain.     Objective     Santino participated in dynamic functional therapeutic activities to improve functional performance for 45 minutes, including:    -Stand-pivot t/f w/c->mat w/ max A x1   -OT removed Santino's edema glove and supination strap.  -Gentle mobilization of metacarpals, carpals, radius and ulna with stretching of the surrounding soft tissues to decrease edema and improve PROM, potentially allowing for increases in active movement.   -gentle stretching to digits for IP flexion, MCP flexion, composite flexion  -stretching wrist to extension; WB through R wrist to stretch to extension with L UE contralateral reaching to move items.  -gentle stretching to supination, elbow flexion.    -sit->quadruped mod A. In quadruped, she rolled the ball w/ assist for R UE WB to keep elbow in extension/ give support to prevent it  collapsing into flexion and to increase wrist extension.  -quadruped->sit at EOM w/ CGA and encouragement.  -OT replaced supination strap, edema glove and thumb orthosis. Pt at EOM w/ PTA at end of session.    Home Exercises and Education Provided     Education provided:   -continue stretches at home.   - Progress towards goals     Written Home Exercises Provided: Patient/ family instructed to cont prior HEP.    Assessment     Pt would continue to benefit from skilled OT. Santino tolerated treatment well today, including crawling although she did not have the air-splint on her elbow. Nonetheless, she did require assist for stabilization of the R UE during WB.     Santino is progressing slowly towards her goals and there are no updates to goals at this time. Pt prognosis is Fair.     Pt will continue to benefit from skilled outpatient occupational therapy to address the deficits listed in the problem list on initial evaluation provide pt/family education and to maximize pt's level of independence in the home and community environment.     Anticipated barriers to occupational therapy: communication; history of inconsistent follow-through with HEP    Pt's spiritual, cultural and educational needs considered and pt agreeable to plan of care and goals.    Goals:  Short Term Goals: 4 weeks  (12/11/2019-1/8/2020)  1) Santino's family will be mod I w/ initial HEP. (progressing 3/5/2020)  2) Santino will tolerate progressive adjustments to new/modified orthoses (as appropriate) to improve thumb positioning, as well as to improve wrist support for crawling. (progressing 3/5/2020)  3) Wrist extension PROM will improve to 40* or better. (MET 1/29/2019)      Long Term Goals: 8 weeks (12/11/2019-2/5/2020)  1) Santino's family will be mod I w/ d/c HEP. (progressing 3/5/2020)  2) Santino's family will be mod I with orthotic wear and care. (progressing 3/5/2020)  3) Santino will be able to crawl the length of the mat x3 with crawling orthosis  remaining in place and not slipping off. (progressing 3/5/2020)  4) Ambry will demo 45-50* passive wrist extension to increase indep w/ crawling. (progressing 3/5/2020)    Plan     Continue stretching, WB., quadruped.     Arlene Mak, OT

## 2020-03-05 NOTE — PROGRESS NOTES
Physical Therapy Daily Treatment Note     Name: Santino Coxtcher  Clinic Number: [id number]    Therapy Diagnosis:   No diagnosis found.  Physician: Dieudonne Jones MD    Visit Date: 3/5/2020    Physician Orders: PT Eval and Treat   Medical Diagnosis from Referral: Muscle spasticity             Evaluation Date: 12/11/2019  Authorization Period Expiration: 12/31/2020  Plan of Care Expiration: 2/14/2020  Visit # / Visits authorized: 7/20    Time In: 1000  Time Out: 1100  Total Billable Time: 60 minutes    Precautions: Fall    Subjective     Pt reports: No complaints, pleasant upon arrival, smiling and dancing to [employer] N/A compliant with home exercise program.  Response to previous treatment: No change  Functional change: none     Pain: 0/10  Location: Unable to determine due to communication challenges.      Objective     Santino received therapeutic exercises to develop strength and flexibility for 60 minutes including:    Manual hamstring stretch in supine 3x30s R/L  Passive hip flexor stretch in prone 2x1' R/L (small wedge placed under anterior thigh)  Passive hip internal rotator stretch in supine 3x30s R/L  Manual hamstring stretch in supine 3x30s R/L  Bridge x 10 (Bolster under LE's; TC's for proper mm activation)  Prone x5' (To promote weightbearing through UE's and to help with extension in LE's)  Qped x5' ( Weight shifts to promote weightbearing in UE's and to work on dynamic balance. Pt mod A to obtain position. Later pt was able to assume qped position with CGA)  Tall Kneeling x5' (With chair in front of pt for UE placement to promote core activation to improve static tall kneeling balance and weightbearing through LE's. Mod A to assume position;Min A to maintain)  Sustaining static seated position x2' (VC and TC for upright posture)  Crawling: Multiple small steps with TC's for UE advancement and Min A for advancement of R LE  Seated Scooting x5 L<>R with step underneath LE's to  position to LE's @90 degrees with therapist advancing LE's  SAQ x20 B  Stand pivot transfer w/c >mat with max/dependent of 1  (patient does not achieve/maintain hip/knee extension during transfer).  Supine <> sit transfer max A of 2 for trunk control and LE management.      Home Exercises Provided and Patient Education Provided     Education provided:   - Demonstrated passive stretching to pt's caregiver/brother that was present.      Written Home Exercises Provided: Not this date..      Assessment   Pt with stronger tone in R HS and eases with a sustained stretch. Pt continues to sacral sit with rounded shoulder posture but corrects posture when therapist uses VC's. Patient required mod A to maintain upright sitting. Pt able to perform side lying to sit with min A.     Santino is progressing fairlry towards her goals.   Pt prognosis is Fair.     Pt will continue to benefit from skilled outpatient physical therapy to address the deficits listed in the problem list box on initial evaluation, provide pt/family education and to maximize pt's level of independence in the home and community environment.     Pt's spiritual, cultural and educational needs considered and pt agreeable to plan of care and goals.     Anticipated barriers to physical therapy: difficulty following instructions.      Goals:   Short Term Goals: Not met (2nd visit following evaluation)               1) Facilitate improved sitting posture with improved leveling of pelvis (Cont progressing)              2) Facilitate improved trunk ROM (Cont progressing)              3) Facilitate improved LE flexibility( Cont progressing)     Long Term Goals: Not met (2nd visit following evaluation)               1) Patient will sit with level pelvis (Cont progressing)              2) Improve hip ROM to facilitate decreased difficulty for caregiver to assist with cleaning (Cont progressing)              3) Facilitate improved LE flexibility to assist with transfers  (Cont progressing)              4) Family/caregiver will be independent in stretching activities to facilitate carryover.   (Cont progressing)    Plan     Continue to treat and progress per POC and pt tolerance to exercise.     AIDEE Nguyen     I certify that I was present in the room directing the student in service delivery and guiding them using my skilled judgment. As the co-signing therapist I have reviewed the students documentation and am responsible for the treatment, assessment, and plan.     Emma Villalba, PTA

## 2020-03-10 ENCOUNTER — CLINICAL SUPPORT (OUTPATIENT)
Dept: REHABILITATION | Facility: HOSPITAL | Age: 25
End: 2020-03-10
Attending: PSYCHIATRY & NEUROLOGY
Payer: MEDICAID

## 2020-03-10 DIAGNOSIS — R26.89 DECREASED FUNCTIONAL MOBILITY: ICD-10-CM

## 2020-03-10 DIAGNOSIS — M62.838 MUSCLE SPASTICITY: ICD-10-CM

## 2020-03-10 DIAGNOSIS — M62.838 MUSCLE SPASTICITY: Primary | ICD-10-CM

## 2020-03-10 DIAGNOSIS — M25.631 STIFFNESS OF RIGHT WRIST JOINT: ICD-10-CM

## 2020-03-10 PROCEDURE — 97110 THERAPEUTIC EXERCISES: CPT | Mod: PN

## 2020-03-10 PROCEDURE — 97530 THERAPEUTIC ACTIVITIES: CPT | Mod: PN

## 2020-03-10 NOTE — PROGRESS NOTES
Physical Therapy Daily Treatment Note     Name: Santino Coxtcher  Clinic Number: [id number]    Therapy Diagnosis:   Encounter Diagnoses   Name Primary?    Muscle spasticity     Decreased functional mobility      Physician: Dieudonne Jones MD    Visit Date: 3/10/2020    Physician Orders: PT Eval and Treat   Medical Diagnosis from Referral: Muscle spasticity             Evaluation Date: 12/11/2019  Authorization Period Expiration: 12/31/2020  Plan of Care Expiration: 2/14/2020  Visit # / Visits authorized: 9/20    Time In: 1605  Time Out: 1652  Total Billable Time: 48 minutes    Precautions:Fall and skin (pressure and friction     Subjective     Pt reports: laughing during session.  She was not compliant with home exercise program.  Response to previous treatment: unknown  Functional change: minimally improved sitting balance.      Pain: Unable to verbalize    Objective     Santino received therapeutic exercises to develop strength and flexibility for 34 minutes including:              Supine hip flexor stretch 3x30s ea with manual assistance for positioning and stretch                          Manual hamstring stretch 3x30s ea                          Manual stretch into hip external rotation 3x30s ea                          Manual butterfly stretch 3x30s B              Bridging 2x10 with cues for controlled movement    AAROM hip/knee flexion/extension with tactile cues to facilitate activity 2x10 reps each    Internal/external rotation 2x10 reps each               Santino participated in neuromuscular re-education activities to improve: Balance and Posture for 14 minutes. The following activities were included:              Sitting balance on edge of mat x 3 min              Rolling supine <> prone x 2 with mod - max assist   Supine <> sit x 2 with max assist of 1   Stand pivot transfer x 1 with max assist of 1   Standing with tactile/verbal cues to facilitate hip/knee extension 2x3'     Home Exercises Provided  and Patient Education Provided     Education provided:   - N/A    Written Home Exercises Provided: N/A.  Exercises were reviewed and aSntino was able to demonstrate them prior to the end of the session.  Santino demonstrated poor understanding of the education provided.     See EMR under N/A for exercises provided N/A.    Assessment     Patient demonstrates improved hip flexibility and requires much facilitation to achieve active hip/knee flexion and extension.    Santino is progressing slowly towards her goals.   Pt prognosis is Fair.     Pt will continue to benefit from skilled outpatient physical therapy to address the deficits listed in the problem list box on initial evaluation, provide pt/family education and to maximize pt's level of independence in the home and community environment.     Pt's spiritual, cultural and educational needs considered and pt agreeable to plan of care and goals.     Anticipated barriers to physical therapy: difficulty following instructions     Goals:   Short Term Goals:              1) Facilitate improved sitting posture with improved leveling of pelvis  Initiated              2) Facilitate improved trunk ROM  Not met              3) Facilitate improved LE flexibility  Progressing     Long Term Goals:               1) Patient will sit with level pelvis  Initiated              2) Improve hip ROM to facilitate decreased difficulty for caregiver to assist with cleaning  Progressing              3) Facilitate improved LE flexibility to assist with transfers  Progressing              4) Family/caregiver will be independent in stretching activities to facilitate carryover.  Not met    Plan     Progress flexibility and strengthening activities to progress to enhanced functional mobility activities/reduce assistance required for transfers.      Chelsea Beltran, PT

## 2020-03-10 NOTE — PROGRESS NOTES
Occupational Therapy Daily Treatment Note     Date: 3/10/2020  Name: Santino Pompa  Clinic Number: [id number]    Therapy Diagnosis:   Encounter Diagnoses   Name Primary?    Muscle spasticity Yes    Stiffness of right wrist joint      Physician: Dieudonne Jones MD    Physician Orders: Eval and Treat  Medical Diagnosis: muscle spasticity (due to CP)  Evaluation Date: 12/11/2019  Plan of Care Expiration Period: 2/5/2020  Insurance Authorization period Expiration: 12/31/2020  Date of Return to MD: Specialized orthopedist in April     Visit # / Visits Authorized: 8/20    Time In: 1510  Time Out: 1600  Total Billable Time: 45 minutes    Precautions:  Standard and Fall      Subjective     Mom is present with Santino today. No concerns or functional change to report. Crawling orthosis is intact and working well.  Response to previous treatment: no concerns.   Functional change: Nothing new noted by mom    Pain: 0/10 at rest, based on FLACC, as well as Santino's denial of pain.     Objective     Santino participated in dynamic functional therapeutic activities to improve functional performance for 45 minutes, including:    -Stand-pivot t/f w/c->mat w/ max A x1   -Gentle mobilization of metacarpals, carpals, radius and ulna with stretching of the surrounding soft tissues to decrease edema and improve PROM, potentially allowing for increases in active movement.   -gentle stretching to digits for IP flexion, MCP flexion, composite flexion  -stretching wrist to extension; WB through R wrist to stretch to extension with L UE contralateral reaching to move items.  -gentle stretching to supination, elbow flexion.    -sit->quadruped mod A. In quadruped, she rolled the ball w/ assist for R UE WB to keep wrist in extension. Improved tolerance for this with pt's hand placed on OT's leg, allowing for positioning of the wrist at ~45* extension. We were going to use air-cast, but she was actively pushing through triceps today. Cues  for staying in quadruped, rather than leaning back on heels in more of a kneeling position.   -quadruped->sit at EOM w/ CGA and encouragement.  -Pt at EOM w/ PT at end of session.    Home Exercises and Education Provided     Education provided:   -continue stretches at home.   - Progress towards goals   -Ed mom that we may want to consider a custom thumb spica from [employer] - basically what she has now but actually made specifically for her.  -Ed mom that if Santino stops consistently pushing through the R UE in quadruped, we may want to consider use of an air-splint during crawling. However, as of today, it's not necessary. She v/u.    Written Home Exercises Provided: Patient/ family instructed to cont prior HEP.    Assessment     Pt would continue to benefit from skilled OT. Santino tolerated treatment well today, with increased activation of R triceps during crawling. However, she required total A for controlling the wrist/hand/forearm during crawling and quadruped (though this is typical for her as crawling orthosis was not present in therapy today.)    Santino is progressing slowly towards her goals and there are no updates to goals at this time. Pt prognosis is Fair.     Pt will continue to benefit from skilled outpatient occupational therapy to address the deficits listed in the problem list on initial evaluation provide pt/family education and to maximize pt's level of independence in the home and community environment.     Anticipated barriers to occupational therapy: communication; history of inconsistent follow-through with HEP    Pt's spiritual, cultural and educational needs considered and pt agreeable to plan of care and goals.    Goals:  Short Term Goals: 4 weeks  (12/11/2019-1/8/2020)  1) Santino's family will be mod I w/ initial HEP. (progressing 3/10/2020)  2) Santino will tolerate progressive adjustments to new/modified orthoses (as appropriate) to improve thumb positioning, as well as to improve wrist support  for crawling. (progressing 3/10/2020)  3) Wrist extension PROM will improve to 40* or better. (MET 1/29/2019)      Long Term Goals: 8 weeks (12/11/2019-2/5/2020)  1) Santino's family will be mod I w/ d/c HEP. (progressing 3/10/2020)  2) Santino's family will be mod I with orthotic wear and care. (progressing 3/10/2020)  3) Santino will be able to crawl the length of the mat x3 with crawling orthosis remaining in place and not slipping off. (progressing 3/10/2020)  4) Santino will demo 45-50* passive wrist extension to increase indep w/ crawling. (progressing 3/10/2020)    Plan     Continue stretching, WB., quadruped.     Arlene Mak, OT

## 2020-03-13 ENCOUNTER — CLINICAL SUPPORT (OUTPATIENT)
Dept: REHABILITATION | Facility: HOSPITAL | Age: 25
End: 2020-03-13
Attending: PSYCHIATRY & NEUROLOGY
Payer: MEDICAID

## 2020-03-13 DIAGNOSIS — M62.838 MUSCLE SPASTICITY: ICD-10-CM

## 2020-03-13 DIAGNOSIS — R26.89 DECREASED FUNCTIONAL MOBILITY: ICD-10-CM

## 2020-03-13 DIAGNOSIS — M62.838 MUSCLE SPASTICITY: Primary | ICD-10-CM

## 2020-03-13 DIAGNOSIS — M25.631 STIFFNESS OF RIGHT WRIST JOINT: ICD-10-CM

## 2020-03-13 PROCEDURE — 97530 THERAPEUTIC ACTIVITIES: CPT | Mod: PN

## 2020-03-13 PROCEDURE — 97110 THERAPEUTIC EXERCISES: CPT | Mod: PN

## 2020-03-13 NOTE — PROGRESS NOTES
Occupational Therapy Daily Treatment Note     Date: 3/13/2020  Name: Santino Pompa  Clinic Number: [id number]    Therapy Diagnosis:   Encounter Diagnoses   Name Primary?    Muscle spasticity Yes    Stiffness of right wrist joint      Physician: Dieudonne Jones MD    Physician Orders: Eval and Treat  Medical Diagnosis: muscle spasticity (due to CP)  Evaluation Date: 12/11/2019  Plan of Care Expiration Period: 4/1/2020  Insurance Authorization period Expiration: 12/31/2020  Date of Return to MD: Specialized orthopedist in April     Visit # / Visits Authorized: 9/20    Time In: 0910  Time Out: 1000  Total Billable Time: 45 minutes    Precautions:  Standard and Fall      Subjective     Brother, Alberto is present with Santino today. No functional change to report. Santino has seemed tired this morning.   Response to previous treatment: no concerns.   Functional change: Nothing new noted by mom    Pain: 0/10 at rest, based on FLACC, as well as Santino's denial of pain. However, Santino did c/o discomfort with stretching to the wrist, which was angelica    Objective     Santino participated in dynamic functional therapeutic activities to improve functional performance for 45 minutes, including:    -Stand-pivot t/f w/c->mat w/ total A x1   -Gentle mobilization of metacarpals, carpals, radius and ulna with stretching of the surrounding soft tissues to decrease edema and improve PROM, potentially allowing for increases in active movement.   -gentle stretching to digits for IP flexion, MCP flexion, composite flexion with special attention to the middle finger.  -stretching wrist to extension; WB through R wrist to stretch to extension.  -AAROM R UE for flexion 2x5, h abd/add moving hand to her L knee and to therapist's L knee  -sit->supine x2 w/ mod A  -supine w/ aircast to L elbow to allow increased focus on the shoulder.  -supine AAROM for sh flex 2x5; sh h abd/add 2x5  -supine->sit w/ mod A  -Attempted to use the R UE in a  functional way to hold ball in her lap while she reached w/ the L UE, but she's unable to divide attention between R UE stability and other activities at this time.  -pt supine on mat awaiting PT at end of session.     Home Exercises and Education Provided     Education provided:   -continue stretches at home.   - Progress towards goals     Written Home Exercises Provided: Patient/ family instructed to cont prior HEP.    Assessment     Pt would continue to benefit from skilled OT. Deferred quadruped today due to Santino's fatigue. She did do a fair amount of active movement with the R shoulder. This movement is necessary for her to incorporate the R UE during crawling. PROM wrist extension=35* at end of today's session, which is less than it was on 1/29. Will continue to address.    Santino is progressing slowly towards her goals and there are no updates to goals at this time. Pt prognosis is Fair.     Pt will continue to benefit from skilled outpatient occupational therapy to address the deficits listed in the problem list on initial evaluation provide pt/family education and to maximize pt's level of independence in the home and community environment.     Anticipated barriers to occupational therapy: communication; history of inconsistent follow-through with HEP    Pt's spiritual, cultural and educational needs considered and pt agreeable to plan of care and goals.    Goals:  Short Term Goals: 4 weeks    1) Santino's family will be mod I w/ initial HEP. (progressing 3/13/2020)  2) Santino will tolerate progressive adjustments to new/modified orthoses (as appropriate) to improve thumb positioning, as well as to improve wrist support for crawling. (progressing 3/13/2020)  3) Wrist extension PROM will improve to 40* or better. (MET 1/29/2019)      Long Term Goals: 8 weeks   1) Santino's family will be mod I w/ d/c HEP. (progressing 3/13/2020)  2) Santino's family will be mod I with orthotic wear and care. (progressing  3/13/2020)  3) Santino will be able to crawl the length of the mat x3 with crawling orthosis remaining in place and not slipping off. (progressing 3/13/2020)  4) Santino will demo 45-50* passive wrist extension to increase indep w/ crawling. (progressing 3/13/2020)    Plan     Continue stretching, WB., quadruped, AROM R UE.     Arlene Mak, OT

## 2020-03-15 NOTE — PROGRESS NOTES
Physical Therapy Daily Treatment Note     Name: Santino Coxtcher  Clinic Number: [id number]    Therapy Diagnosis:   Encounter Diagnoses   Name Primary?    Muscle spasticity     Decreased functional mobility      Physician: Dieudonne Jones MD    Visit Date: 3/13/2020    Physician Orders: PT Eval and Treat   Medical Diagnosis from Referral: Muscle spasticity             Evaluation Date: 12/11/2019  Authorization Period Expiration: 12/31/2020  Plan of Care Expiration: 2/14/2020  Visit # / Visits authorized: 10/20    Time In: 1009  Time Out: 1056  Total Billable Time: 43 minutes    Precautions:Fall and skin (pressure and friction     Subjective     Pt reports: laughing and dancing during session.  She was not compliant with home exercise program.  Response to previous treatment: unknown  Functional change: minimally improved sitting balance.      Pain: Unable to verbalize    Objective     Santino received therapeutic exercises to develop strength and flexibility for 30 minutes including:              Supine hip flexor stretch 3x30s ea with manual assistance for positioning and stretch                          Manual hamstring stretch 3x30s ea                          Manual stretch into hip external rotation 3x30s ea                          Manual butterfly stretch 3x30s B              Bridging 2x10 with cues for controlled movement    AAROM hip/knee flexion/extension with tactile cues to facilitate activity 2x10 reps each    Internal/external rotation 2x10 reps each               Santino participated in neuromuscular re-education activities to improve: Balance and Posture for 13 minutes. The following activities were included:              Sitting balance on edge of mat x 3 min              Supine <> sit x 2 with max assist of 1   Stand pivot transfer x 1 with max assist of 1   Standing with tactile/verbal cues to facilitate hip/knee extension 2x3'     Home Exercises Provided and Patient Education Provided      Education provided:   - N/A    Written Home Exercises Provided: N/A.  Exercises were reviewed and Santino was able to demonstrate them prior to the end of the session.  Santino demonstrated poor understanding of the education provided.     See EMR under N/A for exercises provided N/A.    Assessment     Sitting on edge of mat with CGA. Patient able to shift weight in sitting with minimal loss of balance.     Santino is progressing slowly towards her goals.   Pt prognosis is Fair.     Pt will continue to benefit from skilled outpatient physical therapy to address the deficits listed in the problem list box on initial evaluation, provide pt/family education and to maximize pt's level of independence in the home and community environment.     Pt's spiritual, cultural and educational needs considered and pt agreeable to plan of care and goals.     Anticipated barriers to physical therapy: difficulty following instructions     Goals:   Short Term Goals:              1) Facilitate improved sitting posture with improved leveling of pelvis  Initiated              2) Facilitate improved trunk ROM  Not met              3) Facilitate improved LE flexibility  Progressing     Long Term Goals:               1) Patient will sit with level pelvis  Initiated              2) Improve hip ROM to facilitate decreased difficulty for caregiver to assist with cleaning  Progressing              3) Facilitate improved LE flexibility to assist with transfers  Progressing              4) Family/caregiver will be independent in stretching activities to facilitate carryover.  Not met    Plan     Progress flexibility and strengthening activities to progress to enhanced functional mobility activities/reduce assistance required for transfers.  Add trunk ROM exercises.    Chelsea Beltran, PT

## 2020-03-17 NOTE — PATIENT INSTRUCTIONS
Proprioception: UE / LE        Position Patient: Sit or lie on firm surface. Motion - [place] applies gentle, steady pressure: Holding left elbow and wrist, press arm in direction of shoulder. (A) Holding left leg with knee straight, press leg in direction of hip. (B) - Do not cause limb to buckle. Hold 5 seconds. Repeat 15 times. Repeat with other arm. Do 2 sessions per day: before exercises Variation: Perform with patient looking at limb.      Copyright © [facility]. All rights reserved.   Tips for Exercise: General Guidelines    -Allow sufficient time for each session so exercises can be carried out thoroughly. -Do not move too fast. Adjust pace so patient can participate fully. -Both patient and helper must remember to breathe normally while performing exercises. -[place] uses one hand to stabilize per instructions, to insure that patient is not substituting different movements to accomplish exercise. -[place]'s hand hold needs to be supportive to promote patient comfort. To protect patient's skin, avoid pinching or grasping too tightly. -Observe patient for signs of discomfort. -Take short breaks as needed.      Copyright © I. All rights reserved.   Tips for Exercise: ROM / AAROM    Range of Motion (ROM) -Use hand positions as instructed on card to insure that motion occurs at desired joint. -Whenever possible use contact of whole hand on patient. -Use slow, steady, smooth motions. -Never force a joint or muscle when you feel resistance. -If motion is painful, follow guidelines from patient's occupational or physical therapist.  Active Assistive Range of Motion (AAROM) -Patient is primary  in these exercises. -[place] provides support and assistance only as needed to control movement.    Copyright © [employer]. All rights reserved.   Tips for Exercise: Stretching    - At point of muscle tension, hold position as instructed. - Use steady pressure. Never bounce. - Ensure that patient feels stretch in intended location. -  Cue patient to exhale slowly during stretch. - Warm muscles stretch more effectively than cold ones. Apply heat for ___ minutes before stretching. (see card BONGG.-2)      Copyright © [employer]. All rights reserved.   Extension: Stabilized Bridging (Supine)        Position (A) Gaffney: Stabilize legs by holding near knees. Block feet if unstable. Motion (B) -Cue patient to exhale while pressing feet down into bed, lifting buttocks.  Hold 3 seconds. Repeat 10 times. Do 3 sessions per day.    Copyright © [employer]. All rights reserved.   Flexion: ROM With Knee Flexion (Supine)        Position (A) Gaffney: Support left leg with one arm. Place other hand just above knee. Motion (B) - Lift and bend knee toward chest. Make sure knee travels in line with body without twisting leg. - Stop at point of tension in muscle or joint.  Repeat 10 times. Repeat with other leg. Do 3 sessions per day. Variation: Do not bend hip past 90°.     Copyright © [employer]. All rights reserved.   Flexion: Stretch - Hamstrings (Supine) Variations for 4a        Variation: Perform with foot pointed out to side. Perform with foot pointed slightly in. Gaffney may kneel on bed, using shoulder to support patient's leg. Do not bend hip past 90°. Bend resting leg. Hold 30 seconds, 3 times, 3 times a day. CAUTION: Do not allow knee to go beyond straight.      Copyright © [employer]. All rights reserved.   Rotation: ROM (Supine)        Position (A) Gaffney: Place hand on left knee. Stabilize shoulder on same side. Motion (B) - Lower legs to side. - Keep shoulders flat. CAUTION: Discontinue if patient experiences back pain during exercise. Repeat 10 times. Repeat to other side. Do 3 sessions per day.       Copyright © [employer]. All rights reserved.

## 2020-03-18 ENCOUNTER — DOCUMENTATION ONLY (OUTPATIENT)
Dept: REHABILITATION | Facility: HOSPITAL | Age: 25
End: 2020-03-18

## 2020-03-26 ENCOUNTER — TELEPHONE (OUTPATIENT)
Dept: REHABILITATION | Facility: HOSPITAL | Age: 25
End: 2020-03-26

## 2020-04-01 ENCOUNTER — TELEPHONE (OUTPATIENT)
Dept: REHABILITATION | Facility: HOSPITAL | Age: 25
End: 2020-04-01

## 2020-04-13 ENCOUNTER — TELEPHONE (OUTPATIENT)
Dept: REHABILITATION | Facility: HOSPITAL | Age: 25
End: 2020-04-13
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
## 2020-04-27 ENCOUNTER — TELEPHONE (OUTPATIENT)
Dept: REHABILITATION | Facility: HOSPITAL | Age: 25
End: 2020-04-27

## 2020-04-27 NOTE — TELEPHONE ENCOUNTER
"TC to pt's mom. She continues to say that things are going "as well as they can." She put all services on hold with the pandemic, including Richarry's caregiver and mom says she is feeling a little burned out. OT offered to let mom get her frustrations out, but she said that Richarry was waking up and she needed to go get her ready for the day. Mom says stretching and and crawling continue and the splint is still working well. OT to f/u in ~2 weeks.  "

## 2020-06-18 ENCOUNTER — TELEPHONE (OUTPATIENT)
Dept: ORTHOPEDICS | Facility: CLINIC | Age: 25
End: 2020-06-18

## 2020-06-18 NOTE — TELEPHONE ENCOUNTER
Faxed referral to [facility] for patient's appointment. Patient's mother notified.       [facility] Phone: [phone]  [facility] Fax: [phone]

## 2020-06-18 NOTE — TELEPHONE ENCOUNTER
----- Message from Hillary Hodge sent at 6/18/2020 10:46 AM CDT -----  Type: Needs Medical Advice  Who Called:  Francisca cooper  Best Call Back Number: [phone] or [phone]  Additional Information: states that she needs the referral for both arms and legs. The current referral only covers the patient's arms. Patient has appt scheduled for 07/22/2020 with Dr. Turner. Please give call back

## 2020-07-08 ENCOUNTER — TELEPHONE (OUTPATIENT)
Dept: ORTHOPEDICS | Facility: CLINIC | Age: 25
End: 2020-07-08

## 2020-07-08 DIAGNOSIS — M79.605 BILATERAL LEG PAIN: Primary | ICD-10-CM

## 2020-07-08 DIAGNOSIS — M79.604 BILATERAL LEG PAIN: Primary | ICD-10-CM

## 2020-07-08 NOTE — TELEPHONE ENCOUNTER
Called [facility] to update the verbal referral for upper extremities and placed new referral for bilateral lower extremities. Faxed updated bilateral lower extremities xray report to [phone].

## 2020-07-08 NOTE — TELEPHONE ENCOUNTER
----- Message from Romy Suárez sent at 7/8/2020 12:59 PM CDT -----  Regarding: referral for the lower extremity needed  Contact: Francisca  Type: Needs Medical Advice  Who Called:  Francisca allison  Best Call Back Number: [phone]  Additional Information: Pls call Francisca regarding the referral for the lower extremity. The dr's office did not receive it.   Pls call [facility] @ [phone]. Their fax is not working right now. They are requesting a phone call.

## 2020-07-13 ENCOUNTER — HOSPITAL ENCOUNTER (OUTPATIENT)
Dept: RADIOLOGY | Facility: HOSPITAL | Age: 25
Discharge: HOME | End: 2020-07-13
Attending: ORTHOPAEDIC SURGERY
Payer: MEDICAID

## 2020-07-13 ENCOUNTER — TELEPHONE (OUTPATIENT)
Dept: UROLOGY | Facility: CLINIC | Age: 25
End: 2020-07-13

## 2020-07-13 DIAGNOSIS — M79.604 BILATERAL LEG PAIN: ICD-10-CM

## 2020-07-13 DIAGNOSIS — M79.605 BILATERAL LEG PAIN: ICD-10-CM

## 2020-07-13 PROCEDURE — 73610 X-RAY EXAM OF ANKLE: CPT | Mod: 26,50,, | Performed by: RADIOLOGY

## 2020-07-13 PROCEDURE — 73552 X-RAY EXAM OF FEMUR 2/>: CPT | Mod: TC,50,FY

## 2020-07-13 PROCEDURE — 73562 X-RAY EXAM OF KNEE 3: CPT | Mod: 26,50,, | Performed by: RADIOLOGY

## 2020-07-13 PROCEDURE — 73552 X-RAY EXAM OF FEMUR 2/>: CPT | Mod: 26,50,, | Performed by: RADIOLOGY

## 2020-07-13 PROCEDURE — 73552 XR FEMUR 2 VIEW BILATERAL: ICD-10-PCS | Mod: 26,50,, | Performed by: RADIOLOGY

## 2020-07-13 PROCEDURE — 73610 XR ANKLE COMPLETE 3 VIEW BILATERAL: ICD-10-PCS | Mod: 26,50,, | Performed by: RADIOLOGY

## 2020-07-13 PROCEDURE — 73590 X-RAY EXAM OF LOWER LEG: CPT | Mod: TC,50,FY

## 2020-07-13 PROCEDURE — 73630 X-RAY EXAM OF FOOT: CPT | Mod: 26,50,, | Performed by: RADIOLOGY

## 2020-07-13 PROCEDURE — 73562 XR KNEE 3 VIEW BILATERAL: ICD-10-PCS | Mod: 26,50,, | Performed by: RADIOLOGY

## 2020-07-13 PROCEDURE — 73590 XR TIBIA FIBULA BILATERAL: ICD-10-PCS | Mod: 26,50,, | Performed by: RADIOLOGY

## 2020-07-13 PROCEDURE — 73630 X-RAY EXAM OF FOOT: CPT | Mod: TC,50,FY

## 2020-07-13 PROCEDURE — 73562 X-RAY EXAM OF KNEE 3: CPT | Mod: TC,50,FY

## 2020-07-13 PROCEDURE — 73610 X-RAY EXAM OF ANKLE: CPT | Mod: TC,50,FY

## 2020-07-13 PROCEDURE — 73630 XR FOOT COMPLETE 3 VIEW BILATERAL: ICD-10-PCS | Mod: 26,50,, | Performed by: RADIOLOGY

## 2020-07-13 PROCEDURE — 73590 X-RAY EXAM OF LOWER LEG: CPT | Mod: 26,50,, | Performed by: RADIOLOGY

## 2020-07-13 NOTE — TELEPHONE ENCOUNTER
----- Message from Alex Ling sent at 7/13/2020  3:27 PM CDT -----  Regarding: pt  Type: Needs Medical Advice    Who Called:  pt    Best Call Back Number: [phone]  Additional Information: need to know when xrays/tests are done so can be sent to office in [place]. Please send to [place]. Pt is done.

## 2020-07-13 NOTE — TELEPHONE ENCOUNTER
Called pt and reported that XRays were resulted and will be reviewed and sent to [facility] in [place], [place].

## 2020-07-27 ENCOUNTER — TELEPHONE (OUTPATIENT)
Dept: ORTHOPEDICS | Facility: CLINIC | Age: 25
End: 2020-07-27

## 2020-07-27 NOTE — TELEPHONE ENCOUNTER
Returned call to patient's mother Francisca. Francisca states patient is seeing  in [place]. I verbalized understanding.

## 2020-07-27 NOTE — TELEPHONE ENCOUNTER
Returned call to patient's mother Francisca. No answer. LVM instructing Francisca to contact the office at [phone]

## 2020-07-27 NOTE — TELEPHONE ENCOUNTER
----- Message from Jose Maria Gallardo sent at 7/27/2020 10:02 AM CDT -----  Contact: pt  Type:  Patient Returning Call    Who Called:  pt  Who Left Message for Patient:  Beckie  Does the patient know what this is regarding?:    Best Call Back Number:  [phone]    Additional Information:

## 2020-07-27 NOTE — TELEPHONE ENCOUNTER
----- Message from Adalberto Vasquez sent at 7/27/2020  8:46 AM CDT -----  Regarding: Pt advice  Contact: Pt mom Francisca  Type:  Patient Returning Call    Who Called:  Francisca  Does the patient know what this is regarding?:  the  pt was referred to [facility] mom that they do not treat pts case. Francisca would like office to follow up and send referral to provider that does  Best Call Back Number:  [phone]  Additional Information:  Thank you

## 2020-08-04 ENCOUNTER — TELEPHONE (OUTPATIENT)
Dept: ORTHOPEDICS | Facility: CLINIC | Age: 25
End: 2020-08-04

## 2020-08-04 NOTE — TELEPHONE ENCOUNTER
----- Message from Imelda Martinez sent at 8/4/2020  2:09 PM CDT -----  Type: Needs Medical Advice    Who Called: Mother (Francisca)  Best Call Back Number: [phone]  Additional Information:  Mother is requesting copy of patient's recent xrays and clinic notes/needs for new doctor's appointment/please call back to advise.

## 2020-08-04 NOTE — TELEPHONE ENCOUNTER
Returned call to patient's mother. Informed patient's mother I will leave requested information at the  in Welcome. Patient's mother verbalized she would put them up today. I verbalized understanding.

## 2020-08-18 ENCOUNTER — DOCUMENTATION ONLY (OUTPATIENT)
Dept: REHABILITATION | Facility: HOSPITAL | Age: 25
End: 2020-08-18

## 2020-08-18 NOTE — DISCHARGE SUMMARY
Outpatient Therapy Discharge Summary     Name: Santino Pompa  Clinic Number: [id number]    Therapy Diagnosis: Muscle spasticity; R wrist joint stiffness  Physician: Dieudonne Jones MD    Physician Orders: OT eval & treat  Medical Diagnosis: Muscle Spasticity (due to CP)  Evaluation Date: 2019    Date of Last visit: 3/13/2020  Total Visits Received: 9  Cancelled Visits: 0  No Show Visits: 0    Assessment    Goals:   Short Term Goals: 4 weeks    1) Santino's family will be mod I w/ initial HEP. (NOT MET due to inconsistency)  2) Santino will tolerate progressive adjustments to new/modified orthoses (as appropriate) to improve thumb positioning, as well as to improve wrist support for crawling. (NOT MET)   3) Wrist extension PROM will improve to 40* or better. (MET 2019)      Long Term Goals: 8 weeks   1) Santino's family will be mod I w/ d/c HEP. (NOT MET)   2) Santino's family will be mod I with orthotic wear and care. (NOT MET)   3) Santino will be able to crawl the length of the mat x3 with crawling orthosis remaining in place and not slipping off. ((NOT MET)   4) Santino will demo 45-50* passive wrist extension to increase indep w/ crawling. (NOT MET)     Discharge reason: Other:  Therapy was interrupted by the Covid-19 pandemic. Pt's mother did not want to bring her to therapy for fear of Santino everardo the virus. OT in full agreement as Santino is at high risk of complications. OT did f/u w/ mom x4 via phone through the month of April, though her POC  on 2020. At this point, no new orders have been received to continue therapy, mom has not requested more therapy and we will d/c.    Plan   This patient is discharged from Occupational Therapy

## 2021-02-11 ENCOUNTER — HOSPITAL ENCOUNTER (EMERGENCY)
Facility: HOSPITAL | Age: 26
Discharge: HOME | End: 2021-02-11
Attending: EMERGENCY MEDICINE
Payer: MEDICAID

## 2021-02-11 VITALS
OXYGEN SATURATION: 98 % | HEIGHT: 60 IN | WEIGHT: 145 LBS | DIASTOLIC BLOOD PRESSURE: 81 MMHG | RESPIRATION RATE: 16 BRPM | HEART RATE: 104 BPM | TEMPERATURE: 99 F | BODY MASS INDEX: 28.47 KG/M2 | SYSTOLIC BLOOD PRESSURE: 132 MMHG

## 2021-02-11 DIAGNOSIS — H10.33 ACUTE CONJUNCTIVITIS OF BOTH EYES, UNSPECIFIED ACUTE CONJUNCTIVITIS TYPE: ICD-10-CM

## 2021-02-11 DIAGNOSIS — Z77.098 CHEMICAL EXPOSURE OF EYE: Primary | ICD-10-CM

## 2021-02-11 PROCEDURE — 25000003 PHARM REV CODE 250: Performed by: EMERGENCY MEDICINE

## 2021-02-11 PROCEDURE — 99283 EMERGENCY DEPT VISIT LOW MDM: CPT | Mod: 25

## 2021-02-11 RX ORDER — ERYTHROMYCIN 5 MG/G
OINTMENT OPHTHALMIC
Qty: 1 TUBE | Refills: 0 | Status: SHIPPED | OUTPATIENT
Start: 2021-02-11 | End: 2021-05-31

## 2021-02-11 RX ADMIN — FLUORESCEIN SODIUM 1 EACH: 1 STRIP OPHTHALMIC at 11:02

## 2021-05-21 ENCOUNTER — TELEPHONE (OUTPATIENT)
Dept: FAMILY MEDICINE | Facility: CLINIC | Age: 26
End: 2021-05-21

## 2021-05-25 ENCOUNTER — TELEPHONE (OUTPATIENT)
Dept: FAMILY MEDICINE | Facility: CLINIC | Age: 26
End: 2021-05-25

## 2021-05-31 ENCOUNTER — OFFICE VISIT (OUTPATIENT)
Dept: FAMILY MEDICINE | Facility: CLINIC | Age: 26
End: 2021-05-31
Payer: MEDICAID

## 2021-05-31 VITALS
SYSTOLIC BLOOD PRESSURE: 118 MMHG | OXYGEN SATURATION: 97 % | WEIGHT: 148 LBS | BODY MASS INDEX: 29.06 KG/M2 | HEIGHT: 60 IN | HEART RATE: 77 BPM | DIASTOLIC BLOOD PRESSURE: 88 MMHG

## 2021-05-31 DIAGNOSIS — G91.9 HYDROCEPHALUS, UNSPECIFIED TYPE: ICD-10-CM

## 2021-05-31 DIAGNOSIS — N39.42 INCONTINENCE WITHOUT SENSORY AWARENESS: ICD-10-CM

## 2021-05-31 DIAGNOSIS — K50.819 CROHN'S DISEASE OF SMALL AND LARGE INTESTINES WITH COMPLICATION: ICD-10-CM

## 2021-05-31 DIAGNOSIS — F79 INTELLECTUAL DISABILITY: ICD-10-CM

## 2021-05-31 DIAGNOSIS — Z98.2 INTRACRANIAL SHUNT: ICD-10-CM

## 2021-05-31 DIAGNOSIS — R26.89 DECREASED FUNCTIONAL MOBILITY: ICD-10-CM

## 2021-05-31 DIAGNOSIS — N39.45 CONTINUOUS LEAKAGE OF URINE: ICD-10-CM

## 2021-05-31 DIAGNOSIS — G80.2 SPASTIC HEMIPLEGIC CEREBRAL PALSY: Primary | ICD-10-CM

## 2021-05-31 DIAGNOSIS — G40.909 NONINTRACTABLE EPILEPSY WITHOUT STATUS EPILEPTICUS, UNSPECIFIED EPILEPSY TYPE: ICD-10-CM

## 2021-05-31 PROBLEM — M24.521 CONTRACTURE, RIGHT ELBOW: Status: ACTIVE | Noted: 2017-01-20

## 2021-05-31 PROBLEM — F45.9 PSYCHOPHYSIOLOGIC DISORDER: Status: ACTIVE | Noted: 2021-05-31

## 2021-05-31 PROBLEM — G81.11 RIGHT SPASTIC HEMIPLEGIA: Status: ACTIVE | Noted: 2019-10-09

## 2021-05-31 PROCEDURE — 99203 PR OFFICE/OUTPT VISIT, NEW, LEVL III, 30-44 MIN: ICD-10-PCS | Mod: S$GLB,,, | Performed by: STUDENT IN AN ORGANIZED HEALTH CARE EDUCATION/TRAINING PROGRAM

## 2021-05-31 PROCEDURE — 99203 OFFICE O/P NEW LOW 30 MIN: CPT | Mod: S$GLB,,, | Performed by: STUDENT IN AN ORGANIZED HEALTH CARE EDUCATION/TRAINING PROGRAM

## 2021-05-31 RX ORDER — MUPIROCIN 20 MG/G
OINTMENT TOPICAL
COMMUNITY

## 2021-05-31 RX ORDER — CICLOPIROX 1 G/100ML
SHAMPOO TOPICAL
COMMUNITY
Start: 2020-10-26 | End: 2024-03-06 | Stop reason: SDUPTHER

## 2021-05-31 RX ORDER — MOMETASONE FUROATE 1 MG/ML
SOLUTION TOPICAL
COMMUNITY
Start: 2020-10-26 | End: 2023-08-01 | Stop reason: SDUPTHER

## 2021-05-31 RX ORDER — NYSTATIN 100000 [USP'U]/G
POWDER TOPICAL
COMMUNITY
End: 2021-12-15 | Stop reason: SDUPTHER

## 2021-05-31 RX ORDER — FLUTICASONE PROPIONATE 50 MCG
SPRAY, SUSPENSION (ML) NASAL
COMMUNITY

## 2021-05-31 RX ORDER — PHENOBARBITAL 97.2 MG/1
TABLET ORAL
COMMUNITY
End: 2021-05-31

## 2021-05-31 RX ORDER — CICLOPIROX 7.7 MG/G
GEL TOPICAL
COMMUNITY
Start: 2020-10-26 | End: 2024-03-06 | Stop reason: SDUPTHER

## 2021-05-31 RX ORDER — MOMETASONE FUROATE 1 MG/G
CREAM TOPICAL
COMMUNITY
End: 2023-08-01 | Stop reason: SDUPTHER

## 2021-08-09 ENCOUNTER — TELEPHONE (OUTPATIENT)
Dept: FAMILY MEDICINE | Facility: CLINIC | Age: 26
End: 2021-08-09

## 2021-10-06 NOTE — ED PROVIDER NOTES
Encounter Date: 10/6/2019    SCRIBE #1 NOTE: I, John Grant, am scribing for, and in the presence of, Fransico Edwards MD.       History     Chief Complaint   Patient presents with    Seizures     at 1215       Time seen by provider: 1:12 PM on 10/06/2019    Santino Pompa is a 24 y.o. female with hydrocephalus, seizures, who presents to the ED with an onset of seizures. Per mom, the first seizure occurred ~1 hour PTA, and the second one occurred while first getting situated in the ED bed. The first seizure lasted 5-7 minutes, and the second one lasted ~1 minute. She never came to her baseline between seizures, and mom notes the patient experienced an episode of vomiting after the first seizure. Prior to the current episode, the pt last had a seizure ~1 year PTA. The pt takes Keppra as prescribed to treat her seizures, and her doctor increased her dosage following her last seizure. Dr. Reid is the pt's neurologist. The pt's baseline is simple speech consisting of 1-2 word responses. She cannot walk, and requires hygiene care be provided by her parents. She can feed herself on her own. She had a ear infection ~2 weeks PTA, and a completed course of ABx resolved the symptoms. Parents deny any other symptoms at this time. A PSHx of brain surgery cited. A drug allergy to Demerol cited.    The history is provided by a parent.     Review of patient's allergies indicates:   Allergen Reactions    Latex, natural rubber Swelling    Demerol [meperidine]      Past Medical History:   Diagnosis Date    Anticoagulant long-term use     Blood transfusion     Hydrocephalus     Has 2 shunts    PDA (patent ductus arteriosus) At birth    repaired    Seizures      Past Surgical History:   Procedure Laterality Date    BRAIN SURGERY      CARDIAC SURGERY      EYE SURGERY       Family History   Problem Relation Age of Onset    Hypertension Maternal Grandmother     COPD Maternal Grandfather     Hypertension  Maternal Grandfather     Hypertension Paternal Grandmother     Kidney disease Paternal Grandfather     Hypertension Father      Social History     Tobacco Use    Smoking status: Never Smoker    Smokeless tobacco: Never Used   Substance Use Topics    Alcohol use: No    Drug use: No     Review of Systems   Neurological: Positive for seizures.       Physical Exam     Initial Vitals [10/06/19 1302]   BP Pulse Resp Temp SpO2   109/67 61 20 98.6 °F (37 °C) 96 %      MAP       --         Physical Exam    Nursing note and vitals reviewed.  Constitutional: She appears well-developed and well-nourished.  Non-toxic appearance. No distress.   HENT:   Head: Normocephalic and atraumatic.   Eyes: Pupils are equal, round, and reactive to light. Right eye exhibits nystagmus. Left eye exhibits nystagmus.   Lateral beating nystagmus, with deviation of right sided gaze.    Neck: Normal range of motion. Neck supple. No neck rigidity. No JVD present.   Cardiovascular: Normal rate, regular rhythm, normal heart sounds and intact distal pulses. Exam reveals no gallop and no friction rub.    No murmur heard.  Pulmonary/Chest: Breath sounds normal. She has no wheezes. She has no rhonchi. She has no rales.   Abdominal: Soft. Bowel sounds are normal. She exhibits no distension. There is no tenderness. There is no rebound and no guarding.   Musculoskeletal: Normal range of motion.   Braces on bilateral lower extremities.   Neurological: She is alert and oriented to person, place, and time. She has normal strength and normal reflexes. No cranial nerve deficit or sensory deficit. She exhibits normal muscle tone. Coordination normal. GCS eye subscore is 4. GCS verbal subscore is 5. GCS motor subscore is 6.   Skin: Skin is warm and dry.   Psychiatric: She has a normal mood and affect. Her speech is normal and behavior is normal. She is not actively hallucinating.         ED Course   Procedures  Labs Reviewed   CBC W/ AUTO DIFFERENTIAL -  Abnormal; Notable for the following components:       Result Value    Hemoglobin 10.9 (*)     Mean Corpuscular Volume 76 (*)     Mean Corpuscular Hemoglobin 22.4 (*)     Mean Corpuscular Hemoglobin Conc 29.4 (*)     RDW 17.8 (*)     Gran% 74.9 (*)     All other components within normal limits   URINALYSIS, REFLEX TO URINE CULTURE    Narrative:     Preferred Collection Type->Urine, Clean Catch   COMPREHENSIVE METABOLIC PANEL   MAGNESIUM   PHOSPHORUS   LEVETIRACETAM  (KEPPRA) LEVEL   POCT URINE PREGNANCY          Imaging Results          CT Head Without Contrast (Final result)  Result time 10/06/19 14:38:19    Final result by Issac Bradley MD (10/06/19 14:38:19)                 Impression:      1. No CT evidence to suggest acute intracranial abnormality.  2. Chronic postsurgical change from remote bilateral parietal craniectomies.  3. Chronic stable course and placement of 4 ventriculostomy catheters.      Electronically signed by: Issac Bradley  Date:    10/06/2019  Time:    14:38             Narrative:    EXAMINATION:  CT HEAD WITHOUT CONTRAST    CLINICAL HISTORY:  Seizures new or progressive;    TECHNIQUE:  Low dose axial images were obtained through the head.  Coronal and sagittal reformations were also performed. Contrast was not administered.    COMPARISON:  CT 03/25/2014.    FINDINGS:  No CT evidence to suggest acute hemorrhage or infarction.  There is a normal pattern of gray-white matter differentiation.  No extra-axial fluid collections.    Chronic postsurgical change from remote bilateral parietal craniectomies.  There again 4 ventriculostomy catheters in place which are unchanged in course and placement over the interval.  There are no grisel changes of hydrocephalus.  The ventricles are stable over the interval.  No intraventricular hemorrhage.  The basilar cisterns are patent.    The imaged paranasal sinuses and ethmoid air cells are well aerated.    Mastoid air cells and middle ears are normally  26.5 pneumatized.                               X-Ray Shunt Series (Final result)  Result time 10/06/19 16:04:43    Final result by Issac Bradley MD (10/06/19 16:04:43)                 Impression:      1. Four intraventricular shunt catheters in place unchanged when compared to the previous examination.  2. Single extracranial catheter terminating in the left upper quadrant unchanged in course over the interval.  3. Questionable increased markings within the right lung.  This may be secondary to positioning.  Further evaluation is recommend with dedicated PA and lateral views of the chest.      Electronically signed by: Issac Bradley  Date:    10/06/2019  Time:    16:04             Narrative:    EXAMINATION:  XR SHUNT SERIES    CLINICAL HISTORY:  seizures;    TECHNIQUE:  AP and lateral views of the skull neck chest and abdomen for shunt evaluation.    COMPARISON:  Head CT same day.  Plain films 03/25/2014.    FINDINGS:  There appears to be no significant interval change.  4 intraventricular catheters are again identified and unchanged in appearance over the interval.    Single catheter is present within the left neck traversing the anterior left hemithorax again terminating in the left upper quadrant.    Questionable increased markings within the right lung.  This may be secondary to rotation.                                 Medical Decision Making:   History:   Old Medical Records: I decided to obtain old medical records.  Initial Assessment:    Patient is a 24-year-old woman with a history hydrocephalus, mental retardation and epilepsy who presents emergency department today for recurrent seizures parents state she has not had her medication changed in over a year and is currently on 750 mg of Keppra twice a day.  Patient had recurrent seizure in the emergency department which was witnessed and ceased prior to giving Ativan cold laboratory evaluation was unremarkable.  There was no evidence grave electrolyte  abnormalities or infection.  She has been afebrile.  There has been no cough or recent illness according to parents.  CT head and shunt series was performed and also showed no acute abnormalities.  Patient is back to baseline neurologically.  Discussed with Dr. Sheriff  Who suggested increasing her Keppra to 1000 mg twice a day and loading her with 1000 mg of Keppra IV in the ED.  This was performed.  Parents are to call patient's neurologist tomorrow for close follow-up.  I do not believe that patient is in status epilepticus.  Clinical Tests:   Lab Tests: Ordered and Reviewed            Scribe Attestation:   Scribe #1: I performed the above scribed service and the documentation accurately describes the services I performed. I attest to the accuracy of the note.    I, Jerry Waldron, personally performed the services described in this documentation. All medical record entries made by the scribe were at my direction and in my presence.  I have reviewed the chart and agree that the record reflects my personal performance and is accurate and complete. Fransico Edwards MD.  6:52 PM 10/06/2019       DISCLAIMER: This note was prepared with [employer] Direct voice recognition transcription software. Garbled syntax, mangled pronouns, and other bizarre constructions may be attributed to that software system.             Clinical Impression:       ICD-10-CM ICD-9-CM   1. Recurrent seizures G40.909 345.80         Disposition:   Disposition: Discharged  Condition: Stable                        Fransico Edwards MD  10/06/19 1450

## 2021-10-07 ENCOUNTER — PATIENT MESSAGE (OUTPATIENT)
Dept: ADMINISTRATIVE | Facility: HOSPITAL | Age: 26
End: 2021-10-07

## 2021-12-15 ENCOUNTER — TELEPHONE (OUTPATIENT)
Dept: FAMILY MEDICINE | Facility: CLINIC | Age: 26
End: 2021-12-15

## 2021-12-15 ENCOUNTER — OFFICE VISIT (OUTPATIENT)
Dept: FAMILY MEDICINE | Facility: CLINIC | Age: 26
End: 2021-12-15
Payer: MEDICAID

## 2021-12-15 VITALS
HEIGHT: 60 IN | OXYGEN SATURATION: 100 % | BODY MASS INDEX: 29.45 KG/M2 | TEMPERATURE: 98 F | DIASTOLIC BLOOD PRESSURE: 74 MMHG | HEART RATE: 84 BPM | WEIGHT: 150 LBS | RESPIRATION RATE: 18 BRPM | SYSTOLIC BLOOD PRESSURE: 128 MMHG

## 2021-12-15 DIAGNOSIS — Z79.899 LONG-TERM USE OF HIGH-RISK MEDICATION: ICD-10-CM

## 2021-12-15 DIAGNOSIS — L30.9 DERMATITIS: ICD-10-CM

## 2021-12-15 DIAGNOSIS — L22 DIAPER RASH: ICD-10-CM

## 2021-12-15 DIAGNOSIS — L89.159 PRESSURE INJURY OF SKIN OF SACRAL REGION, UNSPECIFIED INJURY STAGE: ICD-10-CM

## 2021-12-15 DIAGNOSIS — Z13.220 ENCOUNTER FOR LIPID SCREENING FOR CARDIOVASCULAR DISEASE: ICD-10-CM

## 2021-12-15 DIAGNOSIS — F79 INTELLECTUAL DISABILITY: ICD-10-CM

## 2021-12-15 DIAGNOSIS — B37.2 CANDIDAL INTERTRIGO: ICD-10-CM

## 2021-12-15 DIAGNOSIS — N39.42 INCONTINENCE WITHOUT SENSORY AWARENESS: ICD-10-CM

## 2021-12-15 DIAGNOSIS — Z11.59 ENCOUNTER FOR HEPATITIS C SCREENING TEST FOR LOW RISK PATIENT: ICD-10-CM

## 2021-12-15 DIAGNOSIS — N39.45 CONTINUOUS LEAKAGE OF URINE: ICD-10-CM

## 2021-12-15 DIAGNOSIS — L72.3 SEBACEOUS CYST OF AXILLA: ICD-10-CM

## 2021-12-15 DIAGNOSIS — G80.2 SPASTIC HEMIPLEGIC CEREBRAL PALSY: Primary | ICD-10-CM

## 2021-12-15 DIAGNOSIS — Z11.4 SCREENING FOR HIV (HUMAN IMMUNODEFICIENCY VIRUS): ICD-10-CM

## 2021-12-15 DIAGNOSIS — B37.31 VULVOVAGINAL CANDIDIASIS: ICD-10-CM

## 2021-12-15 DIAGNOSIS — Z13.6 ENCOUNTER FOR LIPID SCREENING FOR CARDIOVASCULAR DISEASE: ICD-10-CM

## 2021-12-15 DIAGNOSIS — R26.89 DECREASED FUNCTIONAL MOBILITY: ICD-10-CM

## 2021-12-15 PROCEDURE — 99214 OFFICE O/P EST MOD 30 MIN: CPT | Mod: S$PBB,,, | Performed by: STUDENT IN AN ORGANIZED HEALTH CARE EDUCATION/TRAINING PROGRAM

## 2021-12-15 PROCEDURE — 99214 PR OFFICE/OUTPT VISIT, EST, LEVL IV, 30-39 MIN: ICD-10-PCS | Mod: S$PBB,,, | Performed by: STUDENT IN AN ORGANIZED HEALTH CARE EDUCATION/TRAINING PROGRAM

## 2021-12-15 RX ORDER — CLOTRIMAZOLE 1 %
CREAM (GRAM) TOPICAL 2 TIMES DAILY
Qty: 45 G | Refills: 0 | Status: SHIPPED | OUTPATIENT
Start: 2021-12-15 | End: 2022-04-04 | Stop reason: SDUPTHER

## 2021-12-15 RX ORDER — MUPIROCIN 20 MG/G
OINTMENT TOPICAL 3 TIMES DAILY
Status: CANCELLED | OUTPATIENT
Start: 2021-12-15

## 2021-12-15 RX ORDER — TRIAMCINOLONE ACETONIDE 5 MG/G
CREAM TOPICAL 2 TIMES DAILY
Qty: 15 G | Refills: 0 | Status: SHIPPED | OUTPATIENT
Start: 2021-12-15 | End: 2022-02-28

## 2021-12-15 RX ORDER — FLUCONAZOLE 150 MG/1
150 TABLET ORAL DAILY
Qty: 2 TABLET | Refills: 0 | Status: SHIPPED | OUTPATIENT
Start: 2021-12-15 | End: 2021-12-17

## 2021-12-15 RX ORDER — NYSTATIN 100000 [USP'U]/G
POWDER TOPICAL 2 TIMES DAILY
Qty: 60 G | Refills: 1 | Status: SHIPPED | OUTPATIENT
Start: 2021-12-15 | End: 2022-05-04 | Stop reason: SDUPTHER

## 2021-12-22 PROCEDURE — G0180 MD CERTIFICATION HHA PATIENT: HCPCS | Mod: ,,, | Performed by: STUDENT IN AN ORGANIZED HEALTH CARE EDUCATION/TRAINING PROGRAM

## 2021-12-22 PROCEDURE — G0180 PR HOME HEALTH MD CERTIFICATION: ICD-10-PCS | Mod: ,,, | Performed by: STUDENT IN AN ORGANIZED HEALTH CARE EDUCATION/TRAINING PROGRAM

## 2021-12-23 ENCOUNTER — TELEPHONE (OUTPATIENT)
Dept: FAMILY MEDICINE | Facility: CLINIC | Age: 26
End: 2021-12-23
Payer: MEDICAID

## 2021-12-27 ENCOUNTER — TELEPHONE (OUTPATIENT)
Dept: FAMILY MEDICINE | Facility: CLINIC | Age: 26
End: 2021-12-27
Payer: MEDICAID

## 2022-01-21 ENCOUNTER — EXTERNAL HOME HEALTH (OUTPATIENT)
Dept: HOME HEALTH SERVICES | Facility: HOSPITAL | Age: 27
End: 2022-01-21
Payer: MEDICAID

## 2022-02-08 ENCOUNTER — DOCUMENT SCAN (OUTPATIENT)
Dept: HOME HEALTH SERVICES | Facility: HOSPITAL | Age: 27
End: 2022-02-08
Payer: MEDICAID

## 2022-03-14 ENCOUNTER — TELEPHONE (OUTPATIENT)
Dept: FAMILY MEDICINE | Facility: CLINIC | Age: 27
End: 2022-03-14
Payer: MEDICAID

## 2022-03-14 DIAGNOSIS — Z11.4 SCREENING FOR HIV (HUMAN IMMUNODEFICIENCY VIRUS): ICD-10-CM

## 2022-03-14 DIAGNOSIS — Z13.220 ENCOUNTER FOR LIPID SCREENING FOR CARDIOVASCULAR DISEASE: ICD-10-CM

## 2022-03-14 DIAGNOSIS — Z13.6 ENCOUNTER FOR LIPID SCREENING FOR CARDIOVASCULAR DISEASE: ICD-10-CM

## 2022-03-14 DIAGNOSIS — Z79.899 LONG-TERM USE OF HIGH-RISK MEDICATION: Primary | ICD-10-CM

## 2022-03-14 DIAGNOSIS — Z11.59 ENCOUNTER FOR HEPATITIS C SCREENING TEST FOR LOW RISK PATIENT: ICD-10-CM

## 2022-03-14 NOTE — TELEPHONE ENCOUNTER
----- Message from Erwin Moreno sent at 3/14/2022  9:37 AM CDT -----  Contact: mother  Asking about orders to be put in for pt please call back     [phone]

## 2022-03-14 NOTE — TELEPHONE ENCOUNTER
Spoke with pt's mother who stated the pt did not have all of the labs drawn and would like orders placed to have her sugars, thyroid, and the other labs that are missing to have them all completed prior to upcoming appointment.

## 2022-03-21 ENCOUNTER — PATIENT OUTREACH (OUTPATIENT)
Dept: ADMINISTRATIVE | Facility: HOSPITAL | Age: 27
End: 2022-03-21
Payer: MEDICAID

## 2022-03-21 ENCOUNTER — PATIENT MESSAGE (OUTPATIENT)
Dept: ADMINISTRATIVE | Facility: HOSPITAL | Age: 27
End: 2022-03-21
Payer: MEDICAID

## 2022-03-21 NOTE — PROGRESS NOTES
CHART REVIEWED  CARE EVERYWHERE AND MEDIA REVIEWED  Mpayy AND LAB [facility] REVIEWED  IMMUNIZATION AND ORDERS REVIEWED    OUTREACH SENT TO PATIENT

## 2022-03-28 ENCOUNTER — LAB VISIT (OUTPATIENT)
Dept: LAB | Facility: HOSPITAL | Age: 27
End: 2022-03-28
Attending: STUDENT IN AN ORGANIZED HEALTH CARE EDUCATION/TRAINING PROGRAM
Payer: MEDICAID

## 2022-03-28 DIAGNOSIS — Z11.59 ENCOUNTER FOR HEPATITIS C SCREENING TEST FOR LOW RISK PATIENT: ICD-10-CM

## 2022-03-28 DIAGNOSIS — Z13.220 ENCOUNTER FOR LIPID SCREENING FOR CARDIOVASCULAR DISEASE: ICD-10-CM

## 2022-03-28 DIAGNOSIS — Z11.4 SCREENING FOR HIV (HUMAN IMMUNODEFICIENCY VIRUS): ICD-10-CM

## 2022-03-28 DIAGNOSIS — Z13.6 ENCOUNTER FOR LIPID SCREENING FOR CARDIOVASCULAR DISEASE: ICD-10-CM

## 2022-03-28 DIAGNOSIS — Z79.899 LONG-TERM USE OF HIGH-RISK MEDICATION: ICD-10-CM

## 2022-03-28 LAB
ANION GAP SERPL CALC-SCNC: 9 MMOL/L (ref 8–16)
BASOPHILS # BLD AUTO: 0.03 K/UL (ref 0–0.2)
BASOPHILS NFR BLD: 0.5 % (ref 0–1.9)
BUN SERPL-MCNC: 9 MG/DL (ref 6–20)
CALCIUM SERPL-MCNC: 9.5 MG/DL (ref 8.7–10.5)
CHLORIDE SERPL-SCNC: 103 MMOL/L (ref 95–110)
CHOLEST SERPL-MCNC: 140 MG/DL (ref 120–199)
CHOLEST/HDLC SERPL: 2.9 {RATIO} (ref 2–5)
CO2 SERPL-SCNC: 26 MMOL/L (ref 23–29)
CREAT SERPL-MCNC: 0.7 MG/DL (ref 0.5–1.4)
DIFFERENTIAL METHOD: ABNORMAL
EOSINOPHIL # BLD AUTO: 0.2 K/UL (ref 0–0.5)
EOSINOPHIL NFR BLD: 2.6 % (ref 0–8)
ERYTHROCYTE [DISTWIDTH] IN BLOOD BY AUTOMATED COUNT: 15.6 % (ref 11.5–14.5)
EST. GFR  (AFRICAN AMERICAN): >60 ML/MIN/1.73 M^2
EST. GFR  (NON AFRICAN AMERICAN): >60 ML/MIN/1.73 M^2
GLUCOSE SERPL-MCNC: 89 MG/DL (ref 70–110)
HCT VFR BLD AUTO: 39.5 % (ref 37–48.5)
HDLC SERPL-MCNC: 49 MG/DL (ref 40–75)
HDLC SERPL: 35 % (ref 20–50)
HGB BLD-MCNC: 11.8 G/DL (ref 12–16)
IMM GRANULOCYTES # BLD AUTO: 0.02 K/UL (ref 0–0.04)
IMM GRANULOCYTES NFR BLD AUTO: 0.3 % (ref 0–0.5)
LDLC SERPL CALC-MCNC: 81 MG/DL (ref 63–159)
LYMPHOCYTES # BLD AUTO: 2.6 K/UL (ref 1–4.8)
LYMPHOCYTES NFR BLD: 41.1 % (ref 18–48)
MCH RBC QN AUTO: 23.9 PG (ref 27–31)
MCHC RBC AUTO-ENTMCNC: 29.9 G/DL (ref 32–36)
MCV RBC AUTO: 80 FL (ref 82–98)
MONOCYTES # BLD AUTO: 0.5 K/UL (ref 0.3–1)
MONOCYTES NFR BLD: 8.7 % (ref 4–15)
NEUTROPHILS # BLD AUTO: 2.9 K/UL (ref 1.8–7.7)
NEUTROPHILS NFR BLD: 46.8 % (ref 38–73)
NONHDLC SERPL-MCNC: 91 MG/DL
NRBC BLD-RTO: 0 /100 WBC
PLATELET # BLD AUTO: 208 K/UL (ref 150–450)
PMV BLD AUTO: 10.8 FL (ref 9.2–12.9)
POTASSIUM SERPL-SCNC: 3.9 MMOL/L (ref 3.5–5.1)
RBC # BLD AUTO: 4.93 M/UL (ref 4–5.4)
SODIUM SERPL-SCNC: 138 MMOL/L (ref 136–145)
TRIGL SERPL-MCNC: 50 MG/DL (ref 30–150)
TSH SERPL DL<=0.005 MIU/L-ACNC: 1.61 UIU/ML (ref 0.4–4)
WBC # BLD AUTO: 6.2 K/UL (ref 3.9–12.7)

## 2022-03-28 PROCEDURE — 80061 LIPID PANEL: CPT | Performed by: STUDENT IN AN ORGANIZED HEALTH CARE EDUCATION/TRAINING PROGRAM

## 2022-03-28 PROCEDURE — 80048 BASIC METABOLIC PNL TOTAL CA: CPT | Performed by: STUDENT IN AN ORGANIZED HEALTH CARE EDUCATION/TRAINING PROGRAM

## 2022-03-28 PROCEDURE — 86803 HEPATITIS C AB TEST: CPT | Performed by: STUDENT IN AN ORGANIZED HEALTH CARE EDUCATION/TRAINING PROGRAM

## 2022-03-28 PROCEDURE — 87389 HIV-1 AG W/HIV-1&-2 AB AG IA: CPT | Performed by: STUDENT IN AN ORGANIZED HEALTH CARE EDUCATION/TRAINING PROGRAM

## 2022-03-28 PROCEDURE — 85025 COMPLETE CBC W/AUTO DIFF WBC: CPT | Performed by: STUDENT IN AN ORGANIZED HEALTH CARE EDUCATION/TRAINING PROGRAM

## 2022-03-28 PROCEDURE — 84443 ASSAY THYROID STIM HORMONE: CPT | Performed by: STUDENT IN AN ORGANIZED HEALTH CARE EDUCATION/TRAINING PROGRAM

## 2022-03-28 PROCEDURE — 80177 DRUG SCRN QUAN LEVETIRACETAM: CPT | Performed by: STUDENT IN AN ORGANIZED HEALTH CARE EDUCATION/TRAINING PROGRAM

## 2022-03-29 LAB
HCV AB SERPL QL IA: NEGATIVE
HIV 1+2 AB+HIV1 P24 AG SERPL QL IA: NEGATIVE

## 2022-03-31 LAB — LEVETIRACETAM SERPL-MCNC: 9.4 UG/ML (ref 3–60)

## 2022-04-04 ENCOUNTER — OFFICE VISIT (OUTPATIENT)
Dept: FAMILY MEDICINE | Facility: CLINIC | Age: 27
End: 2022-04-04
Payer: MEDICAID

## 2022-04-04 ENCOUNTER — TELEPHONE (OUTPATIENT)
Dept: FAMILY MEDICINE | Facility: CLINIC | Age: 27
End: 2022-04-04

## 2022-04-04 VITALS
HEART RATE: 79 BPM | TEMPERATURE: 99 F | WEIGHT: 151 LBS | HEIGHT: 60 IN | OXYGEN SATURATION: 98 % | RESPIRATION RATE: 18 BRPM | DIASTOLIC BLOOD PRESSURE: 78 MMHG | BODY MASS INDEX: 29.64 KG/M2 | SYSTOLIC BLOOD PRESSURE: 126 MMHG

## 2022-04-04 DIAGNOSIS — M62.838 MUSCLE SPASTICITY: ICD-10-CM

## 2022-04-04 DIAGNOSIS — L30.9 DERMATITIS: ICD-10-CM

## 2022-04-04 DIAGNOSIS — R26.89 DECREASED FUNCTIONAL MOBILITY: ICD-10-CM

## 2022-04-04 DIAGNOSIS — F79 INTELLECTUAL DISABILITY: ICD-10-CM

## 2022-04-04 DIAGNOSIS — L22 DIAPER RASH: ICD-10-CM

## 2022-04-04 DIAGNOSIS — G40.909 NONINTRACTABLE EPILEPSY WITHOUT STATUS EPILEPTICUS, UNSPECIFIED EPILEPSY TYPE: ICD-10-CM

## 2022-04-04 DIAGNOSIS — K50.819 CROHN'S DISEASE OF SMALL AND LARGE INTESTINES WITH COMPLICATION: ICD-10-CM

## 2022-04-04 DIAGNOSIS — G80.2 SPASTIC HEMIPLEGIC CEREBRAL PALSY: ICD-10-CM

## 2022-04-04 DIAGNOSIS — G91.9 HYDROCEPHALUS, UNSPECIFIED TYPE: Primary | ICD-10-CM

## 2022-04-04 DIAGNOSIS — N39.42 INCONTINENCE WITHOUT SENSORY AWARENESS: ICD-10-CM

## 2022-04-04 DIAGNOSIS — B37.2 CANDIDAL INTERTRIGO: ICD-10-CM

## 2022-04-04 DIAGNOSIS — N39.45 CONTINUOUS LEAKAGE OF URINE: ICD-10-CM

## 2022-04-04 PROCEDURE — 99214 OFFICE O/P EST MOD 30 MIN: CPT | Mod: S$PBB,,, | Performed by: STUDENT IN AN ORGANIZED HEALTH CARE EDUCATION/TRAINING PROGRAM

## 2022-04-04 PROCEDURE — 1159F MED LIST DOCD IN RCRD: CPT | Mod: CPTII,,, | Performed by: STUDENT IN AN ORGANIZED HEALTH CARE EDUCATION/TRAINING PROGRAM

## 2022-04-04 PROCEDURE — 3074F SYST BP LT 130 MM HG: CPT | Mod: CPTII,,, | Performed by: STUDENT IN AN ORGANIZED HEALTH CARE EDUCATION/TRAINING PROGRAM

## 2022-04-04 PROCEDURE — 1160F PR REVIEW ALL MEDS BY PRESCRIBER/CLIN PHARMACIST DOCUMENTED: ICD-10-PCS | Mod: CPTII,,, | Performed by: STUDENT IN AN ORGANIZED HEALTH CARE EDUCATION/TRAINING PROGRAM

## 2022-04-04 PROCEDURE — 99214 PR OFFICE/OUTPT VISIT, EST, LEVL IV, 30-39 MIN: ICD-10-PCS | Mod: S$PBB,,, | Performed by: STUDENT IN AN ORGANIZED HEALTH CARE EDUCATION/TRAINING PROGRAM

## 2022-04-04 PROCEDURE — 3074F PR MOST RECENT SYSTOLIC BLOOD PRESSURE < 130 MM HG: ICD-10-PCS | Mod: CPTII,,, | Performed by: STUDENT IN AN ORGANIZED HEALTH CARE EDUCATION/TRAINING PROGRAM

## 2022-04-04 PROCEDURE — 99999 PR PBB SHADOW E&M-EST. PATIENT-LVL V: CPT | Mod: PBBFAC,,, | Performed by: STUDENT IN AN ORGANIZED HEALTH CARE EDUCATION/TRAINING PROGRAM

## 2022-04-04 PROCEDURE — 1159F PR MEDICATION LIST DOCUMENTED IN MEDICAL RECORD: ICD-10-PCS | Mod: CPTII,,, | Performed by: STUDENT IN AN ORGANIZED HEALTH CARE EDUCATION/TRAINING PROGRAM

## 2022-04-04 PROCEDURE — 3008F PR BODY MASS INDEX (BMI) DOCUMENTED: ICD-10-PCS | Mod: CPTII,,, | Performed by: STUDENT IN AN ORGANIZED HEALTH CARE EDUCATION/TRAINING PROGRAM

## 2022-04-04 PROCEDURE — 3078F DIAST BP <80 MM HG: CPT | Mod: CPTII,,, | Performed by: STUDENT IN AN ORGANIZED HEALTH CARE EDUCATION/TRAINING PROGRAM

## 2022-04-04 PROCEDURE — 3008F BODY MASS INDEX DOCD: CPT | Mod: CPTII,,, | Performed by: STUDENT IN AN ORGANIZED HEALTH CARE EDUCATION/TRAINING PROGRAM

## 2022-04-04 PROCEDURE — 99999 PR PBB SHADOW E&M-EST. PATIENT-LVL V: ICD-10-PCS | Mod: PBBFAC,,, | Performed by: STUDENT IN AN ORGANIZED HEALTH CARE EDUCATION/TRAINING PROGRAM

## 2022-04-04 PROCEDURE — 3078F PR MOST RECENT DIASTOLIC BLOOD PRESSURE < 80 MM HG: ICD-10-PCS | Mod: CPTII,,, | Performed by: STUDENT IN AN ORGANIZED HEALTH CARE EDUCATION/TRAINING PROGRAM

## 2022-04-04 PROCEDURE — 1160F RVW MEDS BY RX/DR IN RCRD: CPT | Mod: CPTII,,, | Performed by: STUDENT IN AN ORGANIZED HEALTH CARE EDUCATION/TRAINING PROGRAM

## 2022-04-04 PROCEDURE — 99215 OFFICE O/P EST HI 40 MIN: CPT | Mod: PBBFAC,PN | Performed by: STUDENT IN AN ORGANIZED HEALTH CARE EDUCATION/TRAINING PROGRAM

## 2022-04-04 RX ORDER — CLOTRIMAZOLE 1 %
CREAM (GRAM) TOPICAL 2 TIMES DAILY
Qty: 45 G | Refills: 0 | Status: SHIPPED | OUTPATIENT
Start: 2022-04-04 | End: 2022-05-04 | Stop reason: SDUPTHER

## 2022-04-04 RX ORDER — TRIAMCINOLONE ACETONIDE 5 MG/G
CREAM TOPICAL
Qty: 15 G | Refills: 3 | Status: SHIPPED | OUTPATIENT
Start: 2022-04-04

## 2022-04-04 RX ORDER — FLUCONAZOLE 150 MG/1
150 TABLET ORAL DAILY
Qty: 2 TABLET | Refills: 0 | Status: SHIPPED | OUTPATIENT
Start: 2022-04-04 | End: 2022-04-05

## 2022-04-04 NOTE — PATIENT INSTRUCTIONS

## 2022-04-04 NOTE — TELEPHONE ENCOUNTER
----- Message from Sobia Barajas sent at 4/4/2022  3:57 PM CDT -----  Contact: Dale  Type: Needs Medical Advice    Who Called: Dale  Best Call Back Number: [phone] fax [phone]  Inquiry/Question: calling to get quantity of ingredients for compound cream that was sent , please advise Thank you~

## 2022-04-04 NOTE — PROGRESS NOTES
Subjective:       Patient ID: Santino Pompa is a 27 y.o. female.    Chief Complaint: Follow-up (3 month - blood work results (review))    Doing well in normal state of health.  Having some oily skin on face/hairline.  Some candida intertrigo and diaper rash that goes on and off and having flairs now that it is more warm.  No issues with seizures. spasticy is stable.  Eating and drinking well. Lactulose maintaines bowels.    She needs a new orthopedics referral to get measured for new afos.     Review of Systems   Constitutional: Negative for activity change, appetite change, fatigue and unexpected weight change.   HENT: Negative for trouble swallowing.    Eyes: Negative for visual disturbance.   Respiratory: Negative for shortness of breath.    Cardiovascular: Negative for chest pain and leg swelling.   Gastrointestinal: Negative for abdominal pain, blood in stool, change in bowel habit and change in bowel habit.   Endocrine: Negative for cold intolerance, heat intolerance, polydipsia and polyuria.   Genitourinary: Negative for dysuria and hematuria.   Musculoskeletal: Negative for arthralgias, back pain and gait problem.   Integumentary:  Positive for rash. Negative for wound.   Neurological: Negative for dizziness, seizures, weakness and headaches.   Psychiatric/Behavioral: Negative for behavioral problems, dysphoric mood and sleep disturbance.         Objective:      Physical Exam  Constitutional:       General: She is not in acute distress.     Appearance: Normal appearance. She is not ill-appearing.      Comments: With mom in room   Eyes:      Conjunctiva/sclera: Conjunctivae normal.      Pupils: Pupils are equal, round, and reactive to light.   Cardiovascular:      Rate and Rhythm: Normal rate and regular rhythm.      Heart sounds: Normal heart sounds. No murmur heard.  Pulmonary:      Effort: Pulmonary effort is normal. No respiratory distress.      Breath sounds: Normal breath sounds. No wheezing.    Abdominal:      Palpations: Abdomen is soft.   Musculoskeletal:      Right lower leg: No edema.      Left lower leg: No edema.      Comments: AFO braces on. Right arm brace on   Skin:     General: Skin is warm and dry.   Neurological:      Mental Status: She is alert. Mental status is at baseline.      Gait: Gait abnormal.      Comments: Wheelchair bound from limited mobility, cerebral palsy, spasticity   Psychiatric:         Mood and Affect: Mood normal.         Behavior: Behavior normal.         Thought Content: Thought content normal.         Judgment: Judgment normal.      Comments: Mental handicap.  Does not speak but is very pleasant         Assessment:       1. Hydrocephalus, unspecified type    2. Spastic hemiplegic cerebral palsy    3. Intellectual disability    4. Nonintractable epilepsy without status epilepticus, unspecified epilepsy type    5. Incontinence without sensory awareness    6. Continuous leakage of urine    7. Crohn's disease of small and large intestines with complication    8. Muscle spasticity    9. Decreased functional mobility    10. Diaper rash    11. Candidal intertrigo    12. Dermatitis        Plan:       Problem List Items Addressed This Visit        Neuro    Hydrocephalus - Primary    Spastic hemiplegic cerebral palsy    Relevant Orders    Ambulatory referral/consult to Orthopedics    Intellectual disability    Epilepsy       Renal/    Incontinence without sensory awareness    Continuous leakage of urine       GI    Crohn's disease of small and large intestines with complication       Orthopedic    Muscle spasticity    Relevant Orders    Ambulatory referral/consult to Orthopedics       Other    Decreased functional mobility      Other Visit Diagnoses     Diaper rash        Finding a compound cream with zinc oxide, nystatin cream in it.     Relevant Medications    clotrimazole (LOTRIMIN) 1 % cream    fluconazole (DIFLUCAN) 150 MG Tab    Candidal intertrigo        Relevant  Medications    fluconazole (DIFLUCAN) 150 MG Tab    Dermatitis        Short trial of steroid cream    Relevant Medications    triamcinolone acetonide 0.5% (KENALOG) 0.5 % Crea          Stable no changes  Reviewed stable labs.

## 2022-04-05 ENCOUNTER — TELEPHONE (OUTPATIENT)
Dept: ORTHOPEDICS | Facility: CLINIC | Age: 27
End: 2022-04-05
Payer: MEDICAID

## 2022-04-05 NOTE — TELEPHONE ENCOUNTER
Dr. Argueta the pharmacist wants to know the oz for each tube of medication,    viraj comes in 56 grm 13oz and 1lb  Lynette come in 15oz 30oz 45oz  A&D comes in 113 grams      Please advise thank you

## 2022-04-05 NOTE — TELEPHONE ENCOUNTER
I believe it is 1 tube clotrimazole cream 1%  1 tube A&D ointment  1 tube of Desitin creamy with 40% zinc ozide  1/4 cup liquid maalox    I didn't think I sent that to [facility]  I thought that had been sent to a different pharmacy in the past and she needed just the clotrimazole    Am I mistaken.

## 2022-04-06 NOTE — TELEPHONE ENCOUNTER
"Talked with Dr. Gamboa at [facility] to give the ingredients for compound for medication. Yesterday Dr. Argueta called office asking how  Many oz of everything he would nee. When calling back today Dr. Gamboa stated that "she cant and will not mix the mixture to call a independent pharmacy" Bee stated "that she is gonna call the pt and let them know"  "

## 2022-05-04 DIAGNOSIS — B37.2 CANDIDAL INTERTRIGO: ICD-10-CM

## 2022-05-04 DIAGNOSIS — L22 DIAPER RASH: ICD-10-CM

## 2022-05-04 RX ORDER — CLOTRIMAZOLE 1 %
CREAM (GRAM) TOPICAL 2 TIMES DAILY
Qty: 45 G | Refills: 3 | Status: SHIPPED | OUTPATIENT
Start: 2022-05-04

## 2022-05-04 RX ORDER — FLUCONAZOLE 150 MG/1
150 TABLET ORAL DAILY
Qty: 1 TABLET | Refills: 0 | Status: SHIPPED | OUTPATIENT
Start: 2022-05-04 | End: 2022-05-05

## 2022-05-04 RX ORDER — NYSTATIN 100000 [USP'U]/G
POWDER TOPICAL 2 TIMES DAILY
Qty: 60 G | Refills: 1 | Status: SHIPPED | OUTPATIENT
Start: 2022-05-04 | End: 2024-03-06 | Stop reason: SDUPTHER

## 2022-05-04 NOTE — TELEPHONE ENCOUNTER
Pt mother stated pt has a Bad diaper rash/ yeast infection.     Can something be sent in to treat this?

## 2022-05-04 NOTE — TELEPHONE ENCOUNTER
----- Message from Pipe Aj sent at 5/4/2022  2:03 PM CDT -----  Type:  Sooner Appointment Request    Caller is requesting a sooner appointment.  Caller declined first available appointment listed below.  Caller will not accept being placed on the waitlist and is requesting a message be sent to doctor.    Name of Caller:  Pt's Mother Francisca  When is the first available appointment?  5/13  Symptoms:  Bad diaper rash/ yeast infection  Best Call Back Number:  [phone]    Additional Information:  Please advise -- Thank you

## 2022-05-17 ENCOUNTER — HOSPITAL ENCOUNTER (OUTPATIENT)
Facility: HOSPITAL | Age: 27
Discharge: HOME | End: 2022-05-19
Attending: EMERGENCY MEDICINE | Admitting: INTERNAL MEDICINE
Payer: MEDICAID

## 2022-05-17 DIAGNOSIS — R56.9 SEIZURE: ICD-10-CM

## 2022-05-17 DIAGNOSIS — G40.909 RECURRENT SEIZURES: Primary | ICD-10-CM

## 2022-05-17 DIAGNOSIS — R56.9 SEIZURES: ICD-10-CM

## 2022-05-17 PROCEDURE — 99285 EMERGENCY DEPT VISIT HI MDM: CPT

## 2022-05-18 PROBLEM — R56.9 SEIZURES: Status: ACTIVE | Noted: 2022-05-18

## 2022-05-18 LAB
ALBUMIN SERPL BCP-MCNC: 3.6 G/DL (ref 3.5–5.2)
ALP SERPL-CCNC: 101 U/L (ref 55–135)
ALT SERPL W/O P-5'-P-CCNC: 18 U/L (ref 10–44)
AMPHET+METHAMPHET UR QL: NEGATIVE
ANION GAP SERPL CALC-SCNC: 9 MMOL/L (ref 8–16)
AST SERPL-CCNC: 18 U/L (ref 10–40)
B-HCG UR QL: NEGATIVE
BARBITURATES UR QL SCN>200 NG/ML: NEGATIVE
BASOPHILS # BLD AUTO: 0.02 K/UL (ref 0–0.2)
BASOPHILS NFR BLD: 0.3 % (ref 0–1.9)
BENZODIAZ UR QL SCN>200 NG/ML: NEGATIVE
BILIRUB SERPL-MCNC: 0.4 MG/DL (ref 0.1–1)
BILIRUB UR QL STRIP: NEGATIVE
BILIRUB UR QL STRIP: NEGATIVE
BUN SERPL-MCNC: 9 MG/DL (ref 6–20)
BZE UR QL SCN: NEGATIVE
CALCIUM SERPL-MCNC: 8.9 MG/DL (ref 8.7–10.5)
CANNABINOIDS UR QL SCN: NEGATIVE
CHLORIDE SERPL-SCNC: 102 MMOL/L (ref 95–110)
CLARITY UR: CLEAR
CLARITY UR: CLEAR
CO2 SERPL-SCNC: 26 MMOL/L (ref 23–29)
COLOR UR: YELLOW
COLOR UR: YELLOW
CREAT SERPL-MCNC: 0.7 MG/DL (ref 0.5–1.4)
CREAT UR-MCNC: 35 MG/DL (ref 15–325)
CTP QC/QA: YES
DIFFERENTIAL METHOD: ABNORMAL
EOSINOPHIL # BLD AUTO: 0.1 K/UL (ref 0–0.5)
EOSINOPHIL NFR BLD: 1.5 % (ref 0–8)
ERYTHROCYTE [DISTWIDTH] IN BLOOD BY AUTOMATED COUNT: 14.4 % (ref 11.5–14.5)
EST. GFR  (AFRICAN AMERICAN): >60 ML/MIN/1.73 M^2
EST. GFR  (NON AFRICAN AMERICAN): >60 ML/MIN/1.73 M^2
GLUCOSE SERPL-MCNC: 106 MG/DL (ref 70–110)
GLUCOSE SERPL-MCNC: 127 MG/DL (ref 70–110)
GLUCOSE UR QL STRIP: NEGATIVE
GLUCOSE UR QL STRIP: NEGATIVE
HCT VFR BLD AUTO: 37.2 % (ref 37–48.5)
HGB BLD-MCNC: 11.6 G/DL (ref 12–16)
HGB UR QL STRIP: NEGATIVE
HGB UR QL STRIP: NEGATIVE
IMM GRANULOCYTES # BLD AUTO: 0.01 K/UL (ref 0–0.04)
IMM GRANULOCYTES NFR BLD AUTO: 0.1 % (ref 0–0.5)
KETONES UR QL STRIP: NEGATIVE
KETONES UR QL STRIP: NEGATIVE
LEUKOCYTE ESTERASE UR QL STRIP: NEGATIVE
LEUKOCYTE ESTERASE UR QL STRIP: NEGATIVE
LYMPHOCYTES # BLD AUTO: 1.9 K/UL (ref 1–4.8)
LYMPHOCYTES NFR BLD: 25.3 % (ref 18–48)
MCH RBC QN AUTO: 24.2 PG (ref 27–31)
MCHC RBC AUTO-ENTMCNC: 31.2 G/DL (ref 32–36)
MCV RBC AUTO: 78 FL (ref 82–98)
MONOCYTES # BLD AUTO: 0.4 K/UL (ref 0.3–1)
MONOCYTES NFR BLD: 5.2 % (ref 4–15)
NEUTROPHILS # BLD AUTO: 5 K/UL (ref 1.8–7.7)
NEUTROPHILS NFR BLD: 67.6 % (ref 38–73)
NITRITE UR QL STRIP: NEGATIVE
NITRITE UR QL STRIP: NEGATIVE
NRBC BLD-RTO: 0 /100 WBC
OPIATES UR QL SCN: NEGATIVE
PCP UR QL SCN>25 NG/ML: NEGATIVE
PH UR STRIP: 8 [PH] (ref 5–8)
PH UR STRIP: 8 [PH] (ref 5–8)
PLATELET # BLD AUTO: 331 K/UL (ref 150–450)
PMV BLD AUTO: 9.8 FL (ref 9.2–12.9)
POTASSIUM SERPL-SCNC: 3.6 MMOL/L (ref 3.5–5.1)
PROT SERPL-MCNC: 7.7 G/DL (ref 6–8.4)
PROT UR QL STRIP: NEGATIVE
PROT UR QL STRIP: NEGATIVE
RBC # BLD AUTO: 4.8 M/UL (ref 4–5.4)
SARS-COV-2 RDRP RESP QL NAA+PROBE: NEGATIVE
SODIUM SERPL-SCNC: 137 MMOL/L (ref 136–145)
SP GR UR STRIP: 1.01 (ref 1–1.03)
SP GR UR STRIP: 1.01 (ref 1–1.03)
TOXICOLOGY INFORMATION: NORMAL
URN SPEC COLLECT METH UR: NORMAL
URN SPEC COLLECT METH UR: NORMAL
UROBILINOGEN UR STRIP-ACNC: NEGATIVE EU/DL
UROBILINOGEN UR STRIP-ACNC: NEGATIVE EU/DL
WBC # BLD AUTO: 7.32 K/UL (ref 3.9–12.7)

## 2022-05-18 PROCEDURE — G0378 HOSPITAL OBSERVATION PER HR: HCPCS

## 2022-05-18 PROCEDURE — 80177 DRUG SCRN QUAN LEVETIRACETAM: CPT | Performed by: EMERGENCY MEDICINE

## 2022-05-18 PROCEDURE — 85025 COMPLETE CBC W/AUTO DIFF WBC: CPT | Performed by: EMERGENCY MEDICINE

## 2022-05-18 PROCEDURE — 82962 GLUCOSE BLOOD TEST: CPT

## 2022-05-18 PROCEDURE — 81025 URINE PREGNANCY TEST: CPT | Performed by: EMERGENCY MEDICINE

## 2022-05-18 PROCEDURE — 96365 THER/PROPH/DIAG IV INF INIT: CPT

## 2022-05-18 PROCEDURE — 80053 COMPREHEN METABOLIC PANEL: CPT | Performed by: EMERGENCY MEDICINE

## 2022-05-18 PROCEDURE — 63600175 PHARM REV CODE 636 W HCPCS: Performed by: EMERGENCY MEDICINE

## 2022-05-18 PROCEDURE — 96375 TX/PRO/DX INJ NEW DRUG ADDON: CPT

## 2022-05-18 PROCEDURE — U0002 COVID-19 LAB TEST NON-CDC: HCPCS | Performed by: EMERGENCY MEDICINE

## 2022-05-18 PROCEDURE — 95819 EEG AWAKE AND ASLEEP: CPT

## 2022-05-18 PROCEDURE — 81003 URINALYSIS AUTO W/O SCOPE: CPT | Mod: 59 | Performed by: EMERGENCY MEDICINE

## 2022-05-18 PROCEDURE — 80307 DRUG TEST PRSMV CHEM ANLYZR: CPT | Performed by: EMERGENCY MEDICINE

## 2022-05-18 PROCEDURE — 25000003 PHARM REV CODE 250: Performed by: NURSE PRACTITIONER

## 2022-05-18 RX ORDER — BACLOFEN 10 MG/1
20 TABLET ORAL 3 TIMES DAILY
Status: DISCONTINUED | OUTPATIENT
Start: 2022-05-18 | End: 2022-05-19 | Stop reason: HOSPADM

## 2022-05-18 RX ORDER — NALOXONE HCL 0.4 MG/ML
0.02 VIAL (ML) INJECTION
Status: DISCONTINUED | OUTPATIENT
Start: 2022-05-18 | End: 2022-05-19 | Stop reason: HOSPADM

## 2022-05-18 RX ORDER — LORAZEPAM 2 MG/ML
0.5 INJECTION INTRAMUSCULAR
Status: COMPLETED | OUTPATIENT
Start: 2022-05-18 | End: 2022-05-18

## 2022-05-18 RX ORDER — LEVETIRACETAM 100 MG/ML
1500 SOLUTION ORAL 2 TIMES DAILY
Status: DISCONTINUED | OUTPATIENT
Start: 2022-05-18 | End: 2022-05-19 | Stop reason: HOSPADM

## 2022-05-18 RX ORDER — NYSTATIN 100000 [USP'U]/G
POWDER TOPICAL 2 TIMES DAILY
Status: DISCONTINUED | OUTPATIENT
Start: 2022-05-18 | End: 2022-05-19 | Stop reason: HOSPADM

## 2022-05-18 RX ORDER — LANOLIN ALCOHOL/MO/W.PET/CERES
800 CREAM (GRAM) TOPICAL
Status: DISCONTINUED | OUTPATIENT
Start: 2022-05-18 | End: 2022-05-19 | Stop reason: HOSPADM

## 2022-05-18 RX ORDER — SODIUM,POTASSIUM PHOSPHATES 280-250MG
2 POWDER IN PACKET (EA) ORAL
Status: DISCONTINUED | OUTPATIENT
Start: 2022-05-18 | End: 2022-05-19 | Stop reason: HOSPADM

## 2022-05-18 RX ORDER — SODIUM CHLORIDE 0.9 % (FLUSH) 0.9 %
10 SYRINGE (ML) INJECTION
Status: DISCONTINUED | OUTPATIENT
Start: 2022-05-18 | End: 2022-05-19 | Stop reason: HOSPADM

## 2022-05-18 RX ORDER — ONDANSETRON 4 MG/1
4 TABLET, ORALLY DISINTEGRATING ORAL EVERY 6 HOURS PRN
Status: DISCONTINUED | OUTPATIENT
Start: 2022-05-18 | End: 2022-05-19 | Stop reason: HOSPADM

## 2022-05-18 RX ORDER — LEVETIRACETAM 10 MG/ML
1000 INJECTION INTRAVASCULAR
Status: COMPLETED | OUTPATIENT
Start: 2022-05-18 | End: 2022-05-18

## 2022-05-18 RX ORDER — ACETAMINOPHEN 325 MG/1
650 TABLET ORAL EVERY 8 HOURS PRN
Status: DISCONTINUED | OUTPATIENT
Start: 2022-05-18 | End: 2022-05-19 | Stop reason: HOSPADM

## 2022-05-18 RX ORDER — ACETAMINOPHEN 325 MG/1
650 TABLET ORAL EVERY 4 HOURS PRN
Status: DISCONTINUED | OUTPATIENT
Start: 2022-05-18 | End: 2022-05-19 | Stop reason: HOSPADM

## 2022-05-18 RX ORDER — LACTULOSE 10 G/15ML
15 SOLUTION ORAL 3 TIMES DAILY
Status: DISCONTINUED | OUTPATIENT
Start: 2022-05-18 | End: 2022-05-19 | Stop reason: HOSPADM

## 2022-05-18 RX ADMIN — LACTULOSE 15 G: 20 SOLUTION ORAL at 08:05

## 2022-05-18 RX ADMIN — LORAZEPAM 0.5 MG: 2 INJECTION INTRAMUSCULAR; INTRAVENOUS at 01:05

## 2022-05-18 RX ADMIN — NYSTATIN: 100000 POWDER TOPICAL at 08:05

## 2022-05-18 RX ADMIN — BACLOFEN 20 MG: 10 TABLET ORAL at 09:05

## 2022-05-18 RX ADMIN — BACLOFEN 20 MG: 10 TABLET ORAL at 08:05

## 2022-05-18 RX ADMIN — LEVETIRACETAM 1500 MG: 100 SOLUTION ORAL at 09:05

## 2022-05-18 RX ADMIN — LEVETIRACETAM 1500 MG: 100 SOLUTION ORAL at 08:05

## 2022-05-18 RX ADMIN — LEVETIRACETAM INJECTION 1000 MG: 10 INJECTION INTRAVENOUS at 01:05

## 2022-05-18 RX ADMIN — POTASSIUM BICARBONATE 60 MEQ: 391 TABLET, EFFERVESCENT ORAL at 09:05

## 2022-05-18 RX ADMIN — BACLOFEN 20 MG: 10 TABLET ORAL at 03:05

## 2022-05-18 RX ADMIN — LACTULOSE 15 G: 20 SOLUTION ORAL at 09:05

## 2022-05-18 NOTE — H&P
Formerly [facility] - Emergency Dept  [facility] Medicine  History & Physical  Face to face encounter: 5/18/2022    Patient Name: Santino Pompa  MRN: [id number]  Patient Class: OP- Observation  Admission Date: 5/17/2022  Attending Physician: Luciana Oates MD  Primary Care Provider: Alysha Lara MD         Patient information was obtained from parent, past medical records and ER records.     Subjective:     Principal Problem:Seizures    Chief Complaint:   Chief Complaint   Patient presents with    Seizures        HPI: 27 year old female with history of severe cerebral palsy and congenital abnormalities, hydrocephalus - sp  shunt/shunt revision, and seizure disorder.   Brought to the ED after 2 episodes of seizures at home at 930 PM and 1115 PM last night.  Mother described seizures as facial/eye twitching, decreased responsiveness and staring off in space.  Per mother, she has no recent illness.  She has no fever or chills.   Compliant with medication. Last seizure about a month ago.    Nonverbal, bed bound, stiffness of right hand, wrist, forearm.  Has good appetite.    Per ED physician, her nurse reported another episode of focal seizure in the ED.  Neurology was consulted and recommended admission, increase keppra to 1500 mg bid as well as EEG in am.    Workup in the ED including CT head was unremarkable. UDS, UA, and levetiracetam are pending.    Received 1 g IV Keppra and IV lorazepam 0.5 mg in the ED.           Past Medical History:   Diagnosis Date    Anticoagulant long-term use     Blood transfusion     Hydrocephalus     Has 2 shunts    PDA (patent ductus arteriosus) At birth    repaired    Seizures        Past Surgical History:   Procedure Laterality Date    BRAIN SURGERY      CARDIAC SURGERY      EYE SURGERY         Review of patient's allergies indicates:   Allergen Reactions    Latex, natural rubber Swelling    Demerol [meperidine]        No current facility-administered  medications on file prior to encounter.     Current Outpatient Medications on File Prior to Encounter   Medication Sig    adalimumab (HUMIRA) 40 mg/0.8 mL injection Inject 40 mg into the skin.    baclofen (LIORESAL) 20 MG tablet Take 20 mg by mouth 3 (three) times daily.    ciclopirox 0.77 % Gel Apply to affected area twice daily for 4 weeks    ciclopirox 1 % shampoo Wash to the affected area and allow to sit 5 minutes prior to rinsing. Use three times a week.    clotrimazole (LOTRIMIN) 1 % cream Apply topically 2 (two) times daily. Please compound with 1 tube A & D ointment, 1 tube Desitin Creamy with 40% zinc oxide, and 1/2 cup of liquid maalox to make magic butt cream.    fluticasone propionate (FLONASE) 50 mcg/actuation nasal spray Every shift    lactulose (CHRONULAC) 10 gram/15 mL solution Take by mouth 3 (three) times daily.    levETIRAcetam (KEPPRA) 100 mg/mL Soln Take 10 mLs (1,000 mg total) by mouth 2 (two) times daily.    mometasone (ELOCON) 0.1 % solution APPLY TO SCALP AT BEDTIME AS NEEDED FOR REDNESS, SCALING OR ITCHING    mometasone 0.1% (ELOCON) 0.1 % cream mometasone 0.1 % topical cream   APPLY A THIN LAYER TO THE AFFECTED AREA(S) BY TOPICAL ROUTE ONCE DAILY    mupirocin (BACTROBAN) 2 % ointment mupirocin 2 % topical ointment   Apply by topical route to affected area BID    nystatin (MYCOSTATIN) powder Apply topically 2 (two) times daily.    ondansetron (ZOFRAN-ODT) 4 MG TbDL Take 1 tablet (4 mg total) by mouth every 6 (six) hours as needed.    promethazine HCl (PHENERGAN ORAL) Take by mouth. PRN    triamcinolone acetonide 0.5% (KENALOG) 0.5 % Crea APPLY TOPICALLY TO BACK OF NECK TWICE DAILY FOR RASH     Family History       Problem Relation (Age of Onset)    COPD Maternal Grandfather    Hypertension Maternal Grandmother, Maternal Grandfather, Paternal Grandmother, Father    Kidney disease Paternal Grandfather          Tobacco Use    Smoking status: Never Smoker    Smokeless tobacco:  Never Used   Substance and Sexual Activity    Alcohol use: Never    Drug use: Never    Sexual activity: Never     Review of Systems   Unable to perform ROS: Patient nonverbal   Objective:     Vital Signs (Most Recent):  Temp: 98.2 °F (36.8 °C) (05/18/22 0000)  Pulse: 73 (05/18/22 0100)  Resp: 18 (05/18/22 0100)  BP: 119/83 (05/18/22 0100)  SpO2: 98 % (05/18/22 0100) Vital Signs (24h Range):  Temp:  [98.2 °F (36.8 °C)] 98.2 °F (36.8 °C)  Pulse:  [73-91] 73  Resp:  [16-18] 18  SpO2:  [98 %] 98 %  BP: (118-119)/(60-83) 119/83     Weight: 68 kg (150 lb)  Body mass index is 29.29 kg/m².    Physical Exam  Vitals reviewed.   Constitutional:       Appearance: She is not diaphoretic.      Comments: Sedated   HENT:      Head: Normocephalic and atraumatic.      Right Ear: External ear normal.      Left Ear: External ear normal.      Nose: Nose normal.   Eyes:      Conjunctiva/sclera: Conjunctivae normal.   Cardiovascular:      Rate and Rhythm: Normal rate and regular rhythm.   Pulmonary:      Effort: Pulmonary effort is normal.      Breath sounds: Normal breath sounds.   Abdominal:      General: Bowel sounds are normal.      Palpations: Abdomen is soft.   Genitourinary:     General: Normal vulva.   Musculoskeletal:      Cervical back: Normal range of motion and neck supple.      Right lower leg: No edema.      Left lower leg: No edema.   Skin:     General: Skin is warm and dry.      Capillary Refill: Capillary refill takes less than 2 seconds.   Neurological:      Comments: Sedated   Psychiatric:      Comments: Unable to assess           Significant Labs: All pertinent labs within the past 24 hours have been reviewed.  CBC:   Recent Labs   Lab 05/18/22  0051   WBC 7.32   HGB 11.6*   HCT 37.2        CMP:   Recent Labs   Lab 05/18/22  0051      K 3.6      CO2 26   *   BUN 9   CREATININE 0.7   CALCIUM 8.9   PROT 7.7   ALBUMIN 3.6   BILITOT 0.4   ALKPHOS 101   AST 18   ALT 18   ANIONGAP 9   EGFRNONAA  >60.0       TSH:   Recent Labs   Lab 03/28/22  1107   TSH 1.612       Significant Imaging: I have reviewed all pertinent imaging results/findings within the past 24 hours.  CT Head Without Contrast    Result Date: 5/18/2022  Head CT scan without contrast COMPARISONS: None ADDITIONAL PERTINENT HISTORY: Seizure disorder TECHNIQUE:  Multiple axial images were obtained from the skull base to the vertex without IV contrast. One of the following dose optimization techniques was utilized in the performance of this exam: Automated exposure control; adjustment of the mA and/or kV according to the patient's size; or use of an iterative  reconstruction technique.  Specific details can be referenced in the facility's radiology CT exam operational policy. FINDINGS: Midline shift: Negative Ventricles:  Biparietal ventriculoperitoneal shunts in place. 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 No significant hydrocephalus noted. Brain parenchyma:  Thinning of the white matter along the atrium of both lateral ventricles no intraparenchymal hemorrhage or mass effect. Extra-axial spaces:  Negative Intracranial vasculature:  Negative Osseous structures:  Negative Paranasal sinuses and mastoid air cells:  Negative Surrounding soft tissues and orbits:  Negative IMPRESSION: 1. Congenital abnormalities as detailed above. 2. Bilateral parietal ventriculoperitoneal shunts in place no hydrocephalus. 3. No hemorrhage or mass effect Electronically signed by:  Antoine Xiao MD  5/18/2022 12:43 AM CDT Workstation: [id number]       Assessment/Plan:     * Seizures  History of seizures but no more than once a day  Last seizure about a month ago  Compliant with medication  Consult neurology.  The ED physician had discussed case with Neurology on-call and recommend increase Keppra to 1.5 g b.i.d. as well as EEG in  a.m.  Seizure precautions        Hydrocephalus  Status post  shunt/shunt revision  CT head showed no hydrocephalus      Contracture, right elbow  Chronic medical condition      Intellectual disability  Chronic medical condition      VTE Risk Mitigation (From admission, onward)         Ordered     IP VTE LOW RISK PATIENT  Once         05/18/22 0149     Place sequential compression device  Until discontinued         05/18/22 0149                   TIP Aaron  Department of Hospital Medicine   [facility] - Emergency Dept

## 2022-05-18 NOTE — PROCEDURES
EEG REPORT    NAME: Santino Pompa  : 1995  MRN: [id number]    DATE of EE2022    CLINICAL INDICATION: This is a 27 y.o. female with history of cerebral palsy and seizure disorder who presented with breakthrough seizures. No further clinical events during her admission.    MEDICATIONS:  Scheduled Meds:   baclofen  20 mg Oral TID    lactulose  15 g Oral TID    levetiracetam  1,500 mg Oral BID    nystatin   Topical (Top) BID     Continuous Infusions:  PRN Meds:.acetaminophen, acetaminophen, magnesium oxide, magnesium oxide, naloxone, ondansetron, potassium bicarbonate, potassium bicarbonate, potassium bicarbonate, potassium, sodium phosphates, potassium, sodium phosphates, potassium, sodium phosphates, sodium chloride 0.9%      EEG DESCRIPTION:  A 16-channel EEG was performed with electrode placement in 10/20 International System. Longitudinal bipolar and referential montages were utilized in analysis.    This is a technically adequate study with minor muscle and motion artifacts.    Summary:    The background consists of a mixture of theta and delta rhythms, with no posterior dominant rhythm seen. There is focal left hemispheric slowing in the delta range. Stage II sleep structures are not seen.     Photic stimulation is performed with no abnormal reactivity noted. Hyperventilation is not performed.    Intermittent sharp wave discharges are seen throughout the recording originating from the left hemisphere that do not evolve into seizures.     No seizures are captured.    Impression:    This is an abnormal EEG due to diffuse slowing of the background activity consistent with a moderate to severe encephalopathy. Left hemispheric slowing may be indicative of focal cerebral dysfunction, correlate clinically. Sharp wave discharges with epileptogenic potential were seen, but no seizures were recorded in this study.    Isi Damon MD  Neurology

## 2022-05-18 NOTE — CONSULTS
[facility]  Neurology Consult    Patient Name: Santino Pompa  MRN: [id number]  : 1995  TODAY'S DATE: 2022  ADMIT DATE: 2022 11:55 PM                                          CONSULTED PROVIDER: Isi Damon MD, Neurologist. Cellphone: [phone]  CONSULT REQUESTED BY: Dr. Sandoval    Chief Complaint   Patient presents with    Seizures       HPI per EMR:  27 year old female with history of severe cerebral palsy and congenital abnormalities, hydrocephalus - sp  shunt/shunt revision, and seizure disorder.   Brought to the ED after 2 episodes of seizures at home at 930 PM and 1115 PM last night.  Mother described seizures as facial/eye twitching, decreased responsiveness and staring off in space.  Per mother, she has no recent illness.  She has no fever or chills.   Compliant with medication. Last seizure about a month ago.    Nonverbal, bed bound, stiffness of right hand, wrist, forearm.  Has good appetite.     Per ED physician, her nurse reported another episode of focal seizure in the ED.  Neurology was consulted and recommended admission, increase keppra to 1500 mg bid as well as EEG in am.     Workup in the ED including CT head was unremarkable. UDS, UA, and levetiracetam are pending.     Received 1 g IV Keppra and IV lorazepam 0.5 mg in the ED.     Neurology Consult:  Patient was seen and examined by me today.  She is 27-year-old woman with history of cerebral palsy, hydrocephalus status post  shunt and seizure disorder who was brought into the emergency room with breakthrough seizures.  History provided by mother.  She states that patient's seizures always have the same semiology characterized by right facial twitching and mouth smacking, fast horizontal eye movements and blinking followed by tongue protrusion and postictal state.  She had 2 seizures back-to-back yesterday which is atypical for patient since her frequency of seizures is once every other year.   Keppra dose was increased.  Her workup has remained unremarkable.    Review of Systems:    Unable to obtain due to AMS.    Past Medical History:  has a past medical history of Anticoagulant long-term use, Blood transfusion, Hydrocephalus, PDA (patent ductus arteriosus) (At birth), and Seizures.    Past Surgical History:  has a past surgical history that includes Brain surgery; Eye surgery; and Cardiac surgery.    Family Medical History: family history includes COPD in her maternal grandfather; Hypertension in her father, maternal grandfather, maternal grandmother, and paternal grandmother; Kidney disease in her paternal grandfather.    Social History:  reports that she has never smoked. She has never used smokeless tobacco. She reports that she does not drink alcohol and does not use drugs.    PCP: Alysha Lara MD    Allergies:   Review of patient's allergies indicates:   Allergen Reactions    Latex, natural rubber Swelling    Demerol [meperidine]        Medications:   No current facility-administered medications on file prior to encounter.     Current Outpatient Medications on File Prior to Encounter   Medication Sig Dispense Refill    adalimumab (HUMIRA) 40 mg/0.8 mL injection Inject 40 mg into the skin.      baclofen (LIORESAL) 20 MG tablet Take 20 mg by mouth 3 (three) times daily.      ciclopirox 0.77 % Gel Apply to affected area twice daily for 4 weeks      ciclopirox 1 % shampoo Wash to the affected area and allow to sit 5 minutes prior to rinsing. Use three times a week.      clotrimazole (LOTRIMIN) 1 % cream Apply topically 2 (two) times daily. Please compound with 1 tube A & D ointment, 1 tube Desitin Creamy with 40% zinc oxide, and 1/2 cup of liquid maalox to make magic butt cream. 45 g 3    fluticasone propionate (FLONASE) 50 mcg/actuation nasal spray Every shift      lactulose (CHRONULAC) 10 gram/15 mL solution Take by mouth 3 (three) times daily.      levETIRAcetam (KEPPRA) 100 mg/mL  Soln Take 10 mLs (1,000 mg total) by mouth 2 (two) times daily. 600 mL 11    mometasone (ELOCON) 0.1 % solution APPLY TO SCALP AT BEDTIME AS NEEDED FOR REDNESS, SCALING OR ITCHING      mometasone 0.1% (ELOCON) 0.1 % cream mometasone 0.1 % topical cream   APPLY A THIN LAYER TO THE AFFECTED AREA(S) BY TOPICAL ROUTE ONCE DAILY      mupirocin (BACTROBAN) 2 % ointment mupirocin 2 % topical ointment   Apply by topical route to affected area BID      nystatin (MYCOSTATIN) powder Apply topically 2 (two) times daily. 60 g 1    ondansetron (ZOFRAN-ODT) 4 MG TbDL Take 1 tablet (4 mg total) by mouth every 6 (six) hours as needed. 12 tablet 0    promethazine HCl (PHENERGAN ORAL) Take by mouth. PRN      triamcinolone acetonide 0.5% (KENALOG) 0.5 % Crea APPLY TOPICALLY TO BACK OF NECK TWICE DAILY FOR RASH 15 g 3     Scheduled Meds:   baclofen  20 mg Oral TID    lactulose  15 g Oral TID    levetiracetam  1,500 mg Oral BID    nystatin   Topical (Top) BID     Continuous Infusions:  PRN Meds:.acetaminophen, acetaminophen, magnesium oxide, magnesium oxide, naloxone, ondansetron, potassium bicarbonate, potassium bicarbonate, potassium bicarbonate, potassium, sodium phosphates, potassium, sodium phosphates, potassium, sodium phosphates, sodium chloride 0.9%      Physical Exam  Current Vitals:  Vitals:    05/18/22 0747   BP: 108/81   Pulse: 98   Resp: 16   Temp: 98.3 °F (36.8 °C)       Physical Exam:  General:  Awake but drowsy, unable to assess orientation due to baseline deficits  HEENT: PERRL, EOMI  CV:  Tachycardic  Lungs: CTAB  Abdomen: +BS    Neurological Exam    MENTAL STATUS EXAM:  Patient is awake but drowsy, interactive when stimulated, can answer some very simple questions with yes and no, laughs frequently, otherwise she is minimally verbal at baseline.    CRANIAL NERVE EXAM:  Pupils are equally reactive to light, extraocular movements seem intact, there is intermittent horizontal nystagmus seen on end gaze, no  facial asymmetry.  Rest unable to assess.    MOTOR EXAM:  Increased muscle tone in upper and lower extremities worse on the right side.  She was seen moving her 4 extremities to command, unable to assess strength in detail.    REFLEXES:  3+ in upper and lower extremities.    SENSORY EXAM  Withdraws to pain equally in all 4 extremities    COORDINATION/CEREBELLAR EXAM:  Unable to assess     GAIT:  Deferred for safety    Laboratory Data & Studies    Recent Labs   Lab 05/18/22 0051   WBC 7.32   HGB 11.6*      MCV 78*       Recent Labs   Lab 05/18/22 0051      K 3.6      CO2 26   BUN 9   *   CALCIUM 8.9       Recent Labs   Lab 05/18/22 0051   PROT 7.7   ALBUMIN 3.6   BILITOT 0.4   AST 18   ALT 18   ALKPHOS 101       No results for input(s): LABPT, INR, APTT in the last 168 hours.    No results for input(s): HGBA1C, CHOL, TRIG, LDLCALC, HDL, TSH in the last 168 hours.      Imaging:  CT Head Without Contrast    Result Date: 5/18/2022  Head CT scan without contrast COMPARISONS: None ADDITIONAL PERTINENT HISTORY: Seizure disorder TECHNIQUE:  Multiple axial images were obtained from the skull base to the vertex without IV contrast. One of the following dose optimization techniques was utilized in the performance of this exam: Automated exposure control; adjustment of the mA and/or kV according to the patient's size; or use of an iterative  reconstruction technique.  Specific details can be referenced in the facility's radiology CT exam operational policy. FINDINGS: Midline shift: Negative Ventricles:  Biparietal ventriculoperitoneal shunts in place. 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 No significant hydrocephalus noted. Brain parenchyma:  Thinning of the white matter along the atrium of both lateral ventricles no intraparenchymal hemorrhage or mass effect.  Extra-axial spaces:  Negative Intracranial vasculature:  Negative Osseous structures:  Negative Paranasal sinuses and mastoid air cells:  Negative Surrounding soft tissues and orbits:  Negative IMPRESSION: 1. Congenital abnormalities as detailed above. 2. Bilateral parietal ventriculoperitoneal shunts in place no hydrocephalus. 3. No hemorrhage or mass effect Electronically signed by:  Antoine Xiao MD  5/18/2022 12:43 AM CDT Workstation: [id number]        Assessment and Plan:    27-year-old woman with history of cerebral palsy, hydrocephalus status post  shunt and seizure disorder who was brought into the emergency room with breakthrough seizures.  Semiology is most consistent with focal motor seizures with impaired awareness.  She has been taking Keppra 1 g b.i.d. for several years without issues.  She has never had back to back seizures like this before, so she was brought to the emergency room.  Keppra dose has been increased, will obtain EEG and reassess.  - admitted to hospital medicine with q.4 hours neuro checks, continuous telemetry monitoring  - increased Keppra to 1500 mg b.i.d.  - EEG was ordered, pending  - blood work showed no significant abnormalities and CT brain has is unchanged from previous  - seizure precautions as inpatient  - avoid seizure threshold lowering medications such as Wellbutrin, tramadol or Benadryl  - treatment of other metabolic abnormalities as per primary team  - neurology will continue to follow      · DVT prophylaxis with chemo/SCD prophylaxis  · Discussed seizure precautions with mother    Patient to follow up with [facility] at [phone] within 3 days from discharge.     Stroke education was provided including stroke risk factors modification and any acute neurological changes including weakness, confusion, visual changes to come straight to the ER.     All questions were answered.                              Thank you kindly for including us in the care of  this patient. Please do not hesitate to contact us with any questions.         65 minutes of critical care time has been spent evaluating with the patient. Time includes chart review not limited to diagnostic imaging, labs, and vitals, patient assessment, discussion with family and nursing, current order evaluations, and new order entries.      Isi Damon MD  Neurology  Cellphone: [phone]

## 2022-05-18 NOTE — ED NOTES
While preparing to start iv pt started having focal seizure, localized to face, pt still responding during seizure

## 2022-05-18 NOTE — PROGRESS NOTES
Patient seen by me at bedside this AM.  Family at bedside.  Patient is at baseline, smiling and agreeable    No further observed seizures today.  Neurology cx pending

## 2022-05-18 NOTE — HPI
27 year old female with history of severe cerebral palsy and congenital abnormalities, hydrocephalus - sp  shunt/shunt revision, and seizure disorder.   Brought to the ED after 2 episodes of seizures at home at 930 PM and 1115 PM last night.  Mother described seizures as facial/eye twitching, decreased responsiveness and staring off in space.  Per mother, she has no recent illness.  She has no fever or chills.   Compliant with medication. Last seizure about a month ago.    Nonverbal, bed bound, stiffness of right hand, wrist, forearm.  Has good appetite.    Per ED physician, her nurse reported another episode of focal seizure in the ED.  Neurology was consulted and recommended admission, increase keppra to 1500 mg bid as well as EEG in am.    Workup in the ED including CT head was unremarkable. UDS, UA, and levetiracetam are pending.    Received 1 g IV Keppra and IV lorazepam 0.5 mg in the ED.

## 2022-05-18 NOTE — SUBJECTIVE & OBJECTIVE
Past Medical History:   Diagnosis Date    Anticoagulant long-term use     Blood transfusion     Hydrocephalus     Has 2 shunts    PDA (patent ductus arteriosus) At birth    repaired    Seizures        Past Surgical History:   Procedure Laterality Date    BRAIN SURGERY      CARDIAC SURGERY      EYE SURGERY         Review of patient's allergies indicates:   Allergen Reactions    Latex, natural rubber Swelling    Demerol [meperidine]        No current facility-administered medications on file prior to encounter.     Current Outpatient Medications on File Prior to Encounter   Medication Sig    adalimumab (HUMIRA) 40 mg/0.8 mL injection Inject 40 mg into the skin.    baclofen (LIORESAL) 20 MG tablet Take 20 mg by mouth 3 (three) times daily.    ciclopirox 0.77 % Gel Apply to affected area twice daily for 4 weeks    ciclopirox 1 % shampoo Wash to the affected area and allow to sit 5 minutes prior to rinsing. Use three times a week.    clotrimazole (LOTRIMIN) 1 % cream Apply topically 2 (two) times daily. Please compound with 1 tube A & D ointment, 1 tube Desitin Creamy with 40% zinc oxide, and 1/2 cup of liquid maalox to make magic butt cream.    fluticasone propionate (FLONASE) 50 mcg/actuation nasal spray Every shift    lactulose (CHRONULAC) 10 gram/15 mL solution Take by mouth 3 (three) times daily.    levETIRAcetam (KEPPRA) 100 mg/mL Soln Take 10 mLs (1,000 mg total) by mouth 2 (two) times daily.    mometasone (ELOCON) 0.1 % solution APPLY TO SCALP AT BEDTIME AS NEEDED FOR REDNESS, SCALING OR ITCHING    mometasone 0.1% (ELOCON) 0.1 % cream mometasone 0.1 % topical cream   APPLY A THIN LAYER TO THE AFFECTED AREA(S) BY TOPICAL ROUTE ONCE DAILY    mupirocin (BACTROBAN) 2 % ointment mupirocin 2 % topical ointment   Apply by topical route to affected area BID    nystatin (MYCOSTATIN) powder Apply topically 2 (two) times daily.    ondansetron (ZOFRAN-ODT) 4 MG TbDL Take 1 tablet (4 mg total) by mouth every 6 (six) hours as  needed.    promethazine HCl (PHENERGAN ORAL) Take by mouth. PRN    triamcinolone acetonide 0.5% (KENALOG) 0.5 % Crea APPLY TOPICALLY TO BACK OF NECK TWICE DAILY FOR RASH     Family History       Problem Relation (Age of Onset)    COPD Maternal Grandfather    Hypertension Maternal Grandmother, Maternal Grandfather, Paternal Grandmother, Father    Kidney disease Paternal Grandfather          Tobacco Use    Smoking status: Never Smoker    Smokeless tobacco: Never Used   Substance and Sexual Activity    Alcohol use: Never    Drug use: Never    Sexual activity: Never     Review of Systems   Unable to perform ROS: Patient nonverbal   Objective:     Vital Signs (Most Recent):  Temp: 98.2 °F (36.8 °C) (05/18/22 0000)  Pulse: 73 (05/18/22 0100)  Resp: 18 (05/18/22 0100)  BP: 119/83 (05/18/22 0100)  SpO2: 98 % (05/18/22 0100) Vital Signs (24h Range):  Temp:  [98.2 °F (36.8 °C)] 98.2 °F (36.8 °C)  Pulse:  [73-91] 73  Resp:  [16-18] 18  SpO2:  [98 %] 98 %  BP: (118-119)/(60-83) 119/83     Weight: 68 kg (150 lb)  Body mass index is 29.29 kg/m².    Physical Exam  Vitals reviewed.   Constitutional:       Appearance: She is not diaphoretic.      Comments: Sedated   HENT:      Head: Normocephalic and atraumatic.      Right Ear: External ear normal.      Left Ear: External ear normal.      Nose: Nose normal.   Eyes:      Conjunctiva/sclera: Conjunctivae normal.   Cardiovascular:      Rate and Rhythm: Normal rate and regular rhythm.   Pulmonary:      Effort: Pulmonary effort is normal.      Breath sounds: Normal breath sounds.   Abdominal:      General: Bowel sounds are normal.      Palpations: Abdomen is soft.   Genitourinary:     General: Normal vulva.   Musculoskeletal:      Cervical back: Normal range of motion and neck supple.      Right lower leg: No edema.      Left lower leg: No edema.   Skin:     General: Skin is warm and dry.      Capillary Refill: Capillary refill takes less than 2 seconds.   Neurological:      Comments:  Sedated   Psychiatric:      Comments: Unable to assess           Significant Labs: All pertinent labs within the past 24 hours have been reviewed.  CBC:   Recent Labs   Lab 05/18/22  0051   WBC 7.32   HGB 11.6*   HCT 37.2        CMP:   Recent Labs   Lab 05/18/22  0051      K 3.6      CO2 26   *   BUN 9   CREATININE 0.7   CALCIUM 8.9   PROT 7.7   ALBUMIN 3.6   BILITOT 0.4   ALKPHOS 101   AST 18   ALT 18   ANIONGAP 9   EGFRNONAA >60.0       TSH:   Recent Labs   Lab 03/28/22  1107   TSH 1.612       Significant Imaging: I have reviewed all pertinent imaging results/findings within the past 24 hours.  CT Head Without Contrast    Result Date: 5/18/2022  Head CT scan without contrast COMPARISONS: None ADDITIONAL PERTINENT HISTORY: Seizure disorder TECHNIQUE:  Multiple axial images were obtained from the skull base to the vertex without IV contrast. One of the following dose optimization techniques was utilized in the performance of this exam: Automated exposure control; adjustment of the mA and/or kV according to the patient's size; or use of an iterative  reconstruction technique.  Specific details can be referenced in the facility's radiology CT exam operational policy. FINDINGS: Midline shift: Negative Ventricles:  Biparietal ventriculoperitoneal shunts in place. 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 No significant hydrocephalus noted. Brain parenchyma:  Thinning of the white matter along the atrium of both lateral ventricles no intraparenchymal hemorrhage or mass effect. Extra-axial spaces:  Negative Intracranial vasculature:  Negative Osseous structures:  Negative Paranasal sinuses and mastoid air cells:  Negative Surrounding soft tissues and orbits:  Negative IMPRESSION: 1. Congenital abnormalities as detailed above. 2. Bilateral parietal  ventriculoperitoneal shunts in place no hydrocephalus. 3. No hemorrhage or mass effect Electronically signed by:  Antoine Xiao MD  5/18/2022 12:43 AM CDT Workstation: [id number]

## 2022-05-18 NOTE — ED PROVIDER NOTES
Encounter Date: 5/17/2022       History     Chief Complaint   Patient presents with    Seizures     Patient with history of cerebral palsy, hydrocephalus and seizure disorder.  Patient is on Keppra 1 g twice a day.  Very compliant with medications.  Patient had witnessed seizure by mother for at 9:30 p.m. was very somnolent then had another seizure at 11:00 p.m. seizure described as patient's ice twitching, patient with decreased responsiveness, staring off in space.  Patient never had 2 seizures in the same day in the past.  No recent illness.  Patient seems to be returning to her normal mental status now.        Review of patient's allergies indicates:   Allergen Reactions    Latex, natural rubber Swelling    Demerol [meperidine]      Past Medical History:   Diagnosis Date    Anticoagulant long-term use     Blood transfusion     Hydrocephalus     Has 2 shunts    PDA (patent ductus arteriosus) At birth    repaired    Seizures      Past Surgical History:   Procedure Laterality Date    BRAIN SURGERY      CARDIAC SURGERY      EYE SURGERY       Family History   Problem Relation Age of Onset    Hypertension Maternal Grandmother     COPD Maternal Grandfather     Hypertension Maternal Grandfather     Hypertension Paternal Grandmother     Kidney disease Paternal Grandfather     Hypertension Father      Social History     Tobacco Use    Smoking status: Never Smoker    Smokeless tobacco: Never Used   Substance Use Topics    Alcohol use: Never    Drug use: Never     Review of Systems   Constitutional: Negative for chills and fever.   HENT: Negative for congestion.    Respiratory: Negative for shortness of breath.    Cardiovascular: Negative for chest pain and palpitations.   Gastrointestinal: Negative for abdominal pain and vomiting.   Genitourinary: Negative for dysuria.   Musculoskeletal: Negative for joint swelling.   Neurological: Positive for seizures.   Psychiatric/Behavioral: Negative for  agitation.       Physical Exam     Initial Vitals [05/18/22 0000]   BP Pulse Resp Temp SpO2   118/60 91 16 98.2 °F (36.8 °C) 98 %      MAP       --         Physical Exam    Nursing note and vitals reviewed.  Constitutional: She is not diaphoretic. No distress.   HENT:   Head: Normocephalic and atraumatic.   Eyes: Conjunctivae are normal.   Neck:   Normal range of motion.  Cardiovascular: Normal rate.   Pulmonary/Chest: Breath sounds normal.   Abdominal: Abdomen is soft. There is no abdominal tenderness.   Musculoskeletal:      Cervical back: Normal range of motion.      Comments: Patient does have contractures of upper extremities.  All extremities are neurovascular intact.     Neurological: She is alert.   Patient is responsive with mother.   Skin: No rash noted.   Psychiatric:   Patient is nonverbal         ED Course   Procedures  Labs Reviewed   CBC W/ AUTO DIFFERENTIAL - Abnormal; Notable for the following components:       Result Value    Hemoglobin 11.6 (*)     MCV 78 (*)     MCH 24.2 (*)     MCHC 31.2 (*)     All other components within normal limits   COMPREHENSIVE METABOLIC PANEL - Abnormal; Notable for the following components:    Glucose 127 (*)     All other components within normal limits   URINALYSIS, REFLEX TO URINE CULTURE    Narrative:     Specimen Source->Urine   DRUG SCREEN PANEL, URINE EMERGENCY    Narrative:     Specimen Source->Urine   URINALYSIS, REFLEX TO URINE CULTURE    Narrative:     Specimen Source->Urine   SARS-COV-2 RNA AMPLIFICATION, QUAL   LEVETIRACETAM  (KEPPRA) LEVEL   POCT URINE PREGNANCY   POCT GLUCOSE   POCT GLUCOSE MONITORING CONTINUOUS          Imaging Results          CT Head Without Contrast (Final result)  Result time 05/18/22 00:43:55    Final result by Antoine Xiao MD (05/18/22 00:43:55)                 Narrative:    Head CT scan without contrast    COMPARISONS: None    ADDITIONAL PERTINENT HISTORY: Seizure disorder    TECHNIQUE:  Multiple axial images were obtained from  the skull base to the vertex without IV contrast. One of the following dose optimization techniques was utilized in the performance of this exam: Automated exposure control; adjustment of the mA and/or kV according to the patient's size; or use of an iterative  reconstruction technique.  Specific details can be referenced in the facility's radiology CT exam operational policy.    FINDINGS:    Midline shift: Negative    Ventricles:  Biparietal ventriculoperitoneal shunts in place. 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 No significant hydrocephalus noted.    Brain parenchyma:  Thinning of the white matter along the atrium of both lateral ventricles no intraparenchymal hemorrhage or mass effect.    Extra-axial spaces:  Negative    Intracranial vasculature:  Negative    Osseous structures:  Negative    Paranasal sinuses and mastoid air cells:  Negative    Surrounding soft tissues and orbits:  Negative      IMPRESSION:  1. Congenital abnormalities as detailed above.  2. Bilateral parietal ventriculoperitoneal shunts in place no hydrocephalus.  3. No hemorrhage or mass effect    Electronically signed by:  Antoine Xiao MD  5/18/2022 12:43 AM CDT Workstation: [id number]                               Medications   sodium chloride 0.9% flush 10 mL (has no administration in time range)   acetaminophen tablet 650 mg (has no administration in time range)   naloxone 0.4 mg/mL injection 0.02 mg (has no administration in time range)   magnesium oxide tablet 800 mg (has no administration in time range)   magnesium oxide tablet 800 mg (has no administration in time range)   potassium, sodium phosphates 280-160-250 mg packet 2 packet (has no administration in time range)   potassium, sodium phosphates 280-160-250 mg packet 2 packet (has no administration in time range)   potassium, sodium phosphates  280-160-250 mg packet 2 packet (has no administration in time range)   potassium bicarbonate disintegrating tablet 50 mEq (has no administration in time range)   potassium bicarbonate disintegrating tablet 35 mEq (has no administration in time range)   potassium bicarbonate disintegrating tablet 60 mEq (has no administration in time range)   acetaminophen tablet 650 mg (has no administration in time range)   lorazepam injection 0.5 mg (0.5 mg Intravenous Given 5/18/22 0100)   levETIRAcetam in NaCl (iso-os) IVPB 1,000 mg (0 mg Intravenous Stopped 5/18/22 0129)     Medical Decision Making:   History:   Old Medical Records: I decided to obtain old medical records.  Clinical Tests:   Lab Tests: Reviewed  Radiological Study: Reviewed  ED Management:  Patient presents with 2 seizures at home.  Keppra level drawn.  Nursing staff report a focal seizure here that lasted less than 30 seconds.  Patient given Ativan.  Discussed with Dr.Siegrist, with neurology.  She recommends Keppra 1 g IV now.  Increased Keppra dose to 15 mg twice a day.  EEG in a.m..                      Clinical Impression:   Final diagnoses:  [G40.909] Recurrent seizures (Primary)  [R56.9] Seizure          ED Disposition Condition    Observation               Andrae Sandoval MD  05/18/22 7926

## 2022-05-18 NOTE — ASSESSMENT & PLAN NOTE
History of seizures but no more than once a day  Last seizure about a month ago  Compliant with medication  Consult neurology.  The ED physician had discussed case with Neurology on-call and recommend increase Keppra to 1.5 g b.i.d. as well as EEG in a.m.  Seizure precautions

## 2022-05-19 VITALS
HEART RATE: 70 BPM | TEMPERATURE: 98 F | SYSTOLIC BLOOD PRESSURE: 97 MMHG | WEIGHT: 150 LBS | DIASTOLIC BLOOD PRESSURE: 69 MMHG | BODY MASS INDEX: 29.45 KG/M2 | RESPIRATION RATE: 16 BRPM | HEIGHT: 60 IN | OXYGEN SATURATION: 96 %

## 2022-05-19 PROCEDURE — G0378 HOSPITAL OBSERVATION PER HR: HCPCS

## 2022-05-19 PROCEDURE — 25000003 PHARM REV CODE 250: Performed by: NURSE PRACTITIONER

## 2022-05-19 RX ORDER — LEVETIRACETAM 100 MG/ML
1500 SOLUTION ORAL 2 TIMES DAILY
Qty: 900 ML | Refills: 0 | Status: SHIPPED | OUTPATIENT
Start: 2022-05-19 | End: 2024-03-06 | Stop reason: SDUPTHER

## 2022-05-19 RX ADMIN — NYSTATIN: 100000 POWDER TOPICAL at 10:05

## 2022-05-19 RX ADMIN — LACTULOSE 15 G: 20 SOLUTION ORAL at 10:05

## 2022-05-19 RX ADMIN — LEVETIRACETAM 1500 MG: 100 SOLUTION ORAL at 10:05

## 2022-05-19 RX ADMIN — BACLOFEN 20 MG: 10 TABLET ORAL at 10:05

## 2022-05-19 NOTE — DISCHARGE SUMMARY
[facility] Medicine  Discharge Summary      Patient Name: Santino Pompa  MRN: [id number]  Patient Class: OP- Observation  Admission Date: 5/17/2022  Hospital Length of Stay: 0 days  Discharge Date and Time:  05/19/2022 10:47 AM  Attending Physician: Kody Reich MD   Discharging Provider: Kody Reich MD  Primary Care Provider: Alysha Lara MD      HPI:   27 year old female with history of severe cerebral palsy and congenital abnormalities, hydrocephalus - sp  shunt/shunt revision, and seizure disorder.   Brought to the ED after 2 episodes of seizures at home at 930 PM and 1115 PM last night.  Mother described seizures as facial/eye twitching, decreased responsiveness and staring off in space.  Per mother, she has no recent illness.  She has no fever or chills.   Compliant with medication. Last seizure about a month ago.    Nonverbal, bed bound, stiffness of right hand, wrist, forearm.  Has good appetite.    Per ED physician, her nurse reported another episode of focal seizure in the ED.  Neurology was consulted and recommended admission, increase keppra to 1500 mg bid as well as EEG in am.    Workup in the ED including CT head was unremarkable. UDS, UA, and levetiracetam are pending.    Received 1 g IV Keppra and IV lorazepam 0.5 mg in the ED.           * No surgery found *      Hospital Course:   Patient admitted to hospital and Neurology consulted.  Neurology increased Keppra to 1500 mg b.i.d..  EEG demonstrated diffuse slowing of background activity consistent with moderate to severe encephalopathy.  Left hemispheric slowing may be indicative of focal cerebral dysfunction.  Sharp wave discharges with epileptogenic potential were seen, but no seizures were recorded during this study.    Head CT demonstrated congenital abnormalities, bilateral parietal ventriculo peritoneal shunts in place without hydrocephalus.  No hemorrhage or mass effect.    Seizure precautions were  reviewed with mother.  Patient to follow up with [facility] at [phone] within 3 days from discharge.     Prescription for increased dosing of Keppra sent to [facility] in [place]      General:  Alert.  Laughing..  No acute distress.  Unable to assess orientation due to underlying CP.  HEENT:  Normocephalic  Cardiovascular:  Regular rate and rhythm, no murmurs rubs or gallops.  No lower extremity edema.  Pulmonary:  Clear to auscultation bilaterally  Abdomen:  Soft, nontender, nondistended.  No guarding.  No rebound.  Negative Arnett's.  Positive bowel sounds.  Extremity:  No C/C/E  Dermatology:  No rashes appreciated on exposed skin  Psychiatric:  Patient at baseline sure fullness per mother.         Goals of Care Treatment Preferences:  Code Status: Full Code      Consults:   Consults (From admission, onward)        Status Ordering Provider     Inpatient consult to Hospitalist  Once        Provider:  TIP Smith    Acknowledged DAYLIN ANDERSEN     Inpatient consult to Hospitalist  Once        Provider:  TPI Smith    Acknowledged DAYLIN ANDERSEN     Inpatient consult to Neurology  Once        Provider:  Isi Altamirano MD    Completed DAYLIN ANDERSEN          * Seizures  Neurology recommendations as above.        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 Schedule an appointment  as soon as possible for a visit in 3 day(s).    Specialty: Neurology  Contact information:  [facility] [facility] [address] [phone]                       Patient Instructions:   No discharge procedures on file.    Significant Diagnostic Studies: Labs:   BMP:   Recent Labs   Lab 05/18/22 0051   *      K 3.6      CO2 26   BUN 9   CREATININE 0.7   CALCIUM 8.9   , CBC   Recent Labs   Lab 05/18/22 0051   WBC 7.32   HGB 11.6*   HCT 37.2       and All labs within the past 24 hours have been reviewed    Pending Diagnostic Studies:     Procedure Component Value Units Date/Time    Levetiracetam level [[id number]] Collected: 05/18/22 0051    Order Status: Sent Lab Status: In process Updated: 05/18/22 0058    Specimen: Blood          Medications:  Reconciled Home Medications:      Medication List      CHANGE how you take these medications    levetiracetam 500 mg/5 mL (5 mL) Soln  Take 15 mLs (1,500 mg total) by mouth 2 (two) times daily.  What changed:   · medication strength  · how much to take        CONTINUE taking these medications    adalimumab 40 mg/0.8 mL Sykt injection  Commonly known as: HUMIRA  Inject 40 mg into the skin.     baclofen 20 MG tablet  Commonly known as: LIORESAL  Take 20 mg by mouth 3 (three) times daily.     * ciclopirox 0.77 % Gel  Apply to affected area twice daily for 4 weeks     * ciclopirox 1 % shampoo  Wash to the affected area and allow to sit 5 minutes prior to rinsing. Use three times a week.     clotrimazole 1 % cream  Commonly known as: LOTRIMIN  Apply topically 2 (two) times daily. Please compound with 1 tube A & D ointment, 1 tube Desitin Creamy with 40% zinc oxide, and 1/2 cup of liquid maalox to make magic butt cream.     fluticasone propionate 50 mcg/actuation nasal spray  Commonly known as: FLONASE  Every shift     lactulose 10 gram/15 mL solution  Commonly known as: CHRONULAC  Take by mouth 3 (three) times daily.     * mometasone  0.1% 0.1 % cream  Commonly known as: ELOCON  mometasone 0.1 % topical cream   APPLY A THIN LAYER TO THE AFFECTED AREA(S) BY TOPICAL ROUTE ONCE DAILY     * mometasone 0.1 % solution  Commonly known as: ELOCON  APPLY TO SCALP AT BEDTIME AS NEEDED FOR REDNESS, SCALING OR ITCHING     mupirocin 2 % ointment  Commonly known as: BACTROBAN  mupirocin 2 % topical ointment   Apply by topical route to affected area BID     nystatin powder  Commonly known as: MYCOSTATIN  Apply topically 2 (two) times daily.     ondansetron 4 MG Tbdl  Commonly known as: ZOFRAN-ODT  Take 1 tablet (4 mg total) by mouth every 6 (six) hours as needed.     PHENERGAN ORAL  Take by mouth. PRN     triamcinolone acetonide 0.5% 0.5 % Crea  Commonly known as: KENALOG  APPLY TOPICALLY TO BACK OF NECK TWICE DAILY FOR RASH         * This list has 4 medication(s) that are the same as other medications prescribed for you. Read the directions carefully, and ask your doctor or other care provider to review them with you.                Indwelling Lines/Drains at time of discharge:   Lines/Drains/Airways     None                 Time spent on the discharge of patient: 31 minutes         Kody Reich MD  Department of Hospital Medicine  [facility]

## 2022-05-19 NOTE — HOSPITAL COURSE
Patient admitted to hospital and Neurology consulted.  Neurology increased Keppra to 1500 mg b.i.d..  EEG demonstrated diffuse slowing of background activity consistent with moderate to severe encephalopathy.  Left hemispheric slowing may be indicative of focal cerebral dysfunction.  Sharp wave discharges with epileptogenic potential were seen, but no seizures were recorded during this study.    Head CT demonstrated congenital abnormalities, bilateral parietal ventriculo peritoneal shunts in place without hydrocephalus.  No hemorrhage or mass effect.    Seizure precautions were reviewed with mother.  [facility] at [phone] within 3 days from discharge.     Prescription for increased dosing of Keppra sent to [facility] in [place]      General:  Alert.  Laughing..  No acute distress.  Unable to assess orientation due to underlying CP.  HEENT:  Normocephalic  Cardiovascular:  Regular rate and rhythm, no murmurs rubs or gallops.  No lower extremity edema.  Pulmonary:  Clear to auscultation bilaterally  Abdomen:  Soft, nontender, nondistended.  No guarding.  No rebound.  Negative Arnett's.  Positive bowel sounds.  Extremity:  No C/C/E  Dermatology:  No rashes appreciated on exposed skin  Psychiatric:  Patient at baseline sure fullness per mother.

## 2022-05-19 NOTE — PLAN OF CARE
[facility]  Initial Discharge Assessment       Primary Care Provider: Alysha Lara MD    Admission Diagnosis: Seizure [R56.9]    Admission Date: 5/17/2022  Expected Discharge Date: 5/19/2022     Assessment completed with pt's mother Francisca Pompa at bedside. Pt lives with her mother and will go home at discharge. Pt gets Respite services through Medicaid, and they usually are able to staff workers for 3 days/week. Pt's mother inquiring about additional insurance coverage - discussed needing work hours to qualify for SSDI/Medicare and plan would have to be purchased through healthcare.gov. Pt's mother inquiring about getting her a [employer] -  advised her to follow up with pt's PCP. No further needs voiced at this time.    Discharge Barriers Identified: None    Payor: [place] / Plan[place] / Product Type: Government /     Extended Emergency Contact Information  Primary Emergency Contact: Francisca Pompa  Address: [address] [place], [place] [address], [place] [address] [facility]  Home Phone: [phone]  Mobile Phone: [phone]  Relation: Mother    Discharge Plan A: Home with family  Discharge Plan B: Home with family      [id number] - [place], [place] - [address] OF [facility] [address]  [address]  [place] [address]  Phone: [phone] Fax: [phone]    [facility] - [place], [place] - [address]  [address] [place] [address]  [place] [address]  Phone: [phone] Fax: [phone]      Initial Assessment (most recent)     Adult Discharge Assessment - 05/19/22 1036        Discharge Assessment    Assessment Type Discharge Planning Assessment     Confirmed/corrected address, phone number and insurance Yes     Confirmed Demographics Correct on Facesheet     Source of Information family     When was your last doctors appointment? --   last week    Communicated JOHN with patient/caregiver Yes      Reason For Admission seizures     Lives With parent(s)     Facility Arrived From: home     Do you expect to return to your current living situation? Yes     Do you have help at home or someone to help you manage your care at home? Yes     Who are your caregiver(s) and their phone number(s)? Mother Francisca Pompa     Prior to hospitilization cognitive status: Unable to Assess     Current cognitive status: Unable to Assess     Walking or Climbing Stairs Difficulty ambulation difficulty, requires equipment     Mobility Management wheelchair bound     Dressing/Bathing Difficulty bathing difficulty, assistance 1 person;dressing difficulty, assistance 1 person     Dressing/Bathing Management mother assists     Home Accessibility wheelchair accessible     Equipment Currently Used at Home wheelchair;lift device     Readmission within 30 days? No     Patient currently being followed by outpatient case management? No     Do you currently have service(s) that help you manage your care at home? Yes     Name and Contact number of agency Respite through [employer] [facility] [employer] (usually able to get workers 3 days/week)     Is the pt/caregiver preference to resume services with current agency Yes     Do you take prescription medications? Yes     Do you have prescription coverage? Yes     Coverage [place] Medicaid     Do you have any problems affording any of your prescribed medications? No     Is the patient taking medications as prescribed? yes     Who is going to help you get home at discharge? mother     How do you get to doctors appointments? family or friend will provide     Are you on dialysis? No     Do you take coumadin? No     Discharge Plan A Home with family     Discharge Plan B Home with family     DME Needed Upon Discharge  none     Discharge Plan discussed with: Parent(s)     Discharge Barriers Identified None

## 2022-05-19 NOTE — NURSING
Pt wheeled out of unit via wheelchair per staff member. No s/s of distress noted. IV and tele discontinued prior to discharge. No bleeding noted. Tele box returned to monitor room. Discharge instructions reviewed over with patient and family at time of discharge. Discharge instructions given to patient. Patient voiced no complaints or concerns.

## 2022-05-19 NOTE — PLAN OF CARE
Problem: Adult Inpatient Plan of Care  Goal: Plan of Care Review  Outcome: Met  Goal: Patient-Specific Goal (Individualized)  Outcome: Met  Goal: Absence of Hospital-Acquired Illness or Injury  Outcome: Met  Goal: Optimal Comfort and Wellbeing  Outcome: Met  Goal: Readiness for Transition of Care  Outcome: Met     Problem: Skin Injury Risk Increased  Goal: Skin Health and Integrity  Outcome: Met

## 2022-05-19 NOTE — PLAN OF CARE
05/19/22 1117   Final Note   Assessment Type Final Discharge Note   Anticipated Discharge Disposition Home   Hospital Resources/Appts/Education Provided Appointments scheduled and added to AVS   Post-Acute Status   Discharge Delays None known at this time   Patient cleared for discharge from case management standpoint.    Follow up appointments scheduled and added to AVS.    Chart and discharge orders reviewed.  Patient discharged home with no further case management needs.

## 2022-05-24 LAB — LEVETIRACETAM SERPL-MCNC: 35.6 UG/ML (ref 10–40)

## 2022-08-29 ENCOUNTER — TELEPHONE (OUTPATIENT)
Dept: FAMILY MEDICINE | Facility: CLINIC | Age: 27
End: 2022-08-29
Payer: MEDICAID

## 2022-08-29 NOTE — TELEPHONE ENCOUNTER
LVM for pt mother to return call to clinic to reschedule appointment due to PCP moving to [facility].     Portal message sent

## 2022-08-31 ENCOUNTER — PATIENT MESSAGE (OUTPATIENT)
Dept: ADMINISTRATIVE | Facility: HOSPITAL | Age: 27
End: 2022-08-31
Payer: MEDICAID

## 2022-08-31 ENCOUNTER — PATIENT OUTREACH (OUTPATIENT)
Dept: ADMINISTRATIVE | Facility: HOSPITAL | Age: 27
End: 2022-08-31
Payer: MEDICAID

## 2022-09-06 ENCOUNTER — PATIENT OUTREACH (OUTPATIENT)
Dept: ADMINISTRATIVE | Facility: HOSPITAL | Age: 27
End: 2022-09-06
Payer: MEDICAID

## 2022-09-08 ENCOUNTER — PATIENT OUTREACH (OUTPATIENT)
Dept: ADMINISTRATIVE | Facility: HOSPITAL | Age: 27
End: 2022-09-08
Payer: MEDICAID

## 2022-10-04 ENCOUNTER — TELEPHONE (OUTPATIENT)
Dept: NEUROSURGERY | Facility: CLINIC | Age: 27
End: 2022-10-04
Payer: MEDICAID

## 2022-10-04 NOTE — TELEPHONE ENCOUNTER
Returned call to pt's mother and informed that unfortunately, we are not able to schedule pt's insurance at this time. She voiced understanding.

## 2022-10-04 NOTE — TELEPHONE ENCOUNTER
----- Message from Stacey M Lefort sent at 10/4/2022 11:01 AM CDT -----  Mom Francisca Pompa wants to make an appt for her daughter. She can be reached at [phone].  Thank you.

## 2023-04-10 DIAGNOSIS — R56.9 SEIZURE-LIKE ACTIVITY: Primary | ICD-10-CM

## 2023-07-26 ENCOUNTER — TELEPHONE (OUTPATIENT)
Dept: FAMILY MEDICINE | Facility: CLINIC | Age: 28
End: 2023-07-26
Payer: MEDICAID

## 2023-07-26 DIAGNOSIS — R56.9 SEIZURE-LIKE ACTIVITY: Primary | ICD-10-CM

## 2023-07-26 NOTE — TELEPHONE ENCOUNTER
Spoke with pt mother via phone. She requests same day appt for discoloration to pt's bottom.  Advised that there were no appts at this clinic this week but I would look to see if other clinics had openings. Called pt mother again, no answer, left detailed VM that I could not find any available spots. Added current appt to waitlist and advised that she continue to use barrier cream and turn pt every 2 hours if possible.

## 2023-07-26 NOTE — TELEPHONE ENCOUNTER
----- Message from Romy Marrero sent at 7/26/2023  9:39 AM CDT -----  Contact: Francisca  Type:  Same Day Appointment Request    Caller is requesting a same day appointment.  Caller declined first available appointment listed below.      Name of Caller:  Francisca/ PT Mother  When is the first available appointment?  8/2/23  Symptoms:  Bump on butt spreading/ Discoloration  Best Call Back Number:  [phone]  Additional Information:   Asking to be seen anytime today if possible

## 2023-07-27 ENCOUNTER — TELEPHONE (OUTPATIENT)
Dept: FAMILY MEDICINE | Facility: CLINIC | Age: 28
End: 2023-07-27
Payer: MEDICAID

## 2023-07-27 NOTE — TELEPHONE ENCOUNTER
Dr. Bravo does participate in [place] Medicaid, I have her [place] # handy for processing company.     Called patient's mother for more information and to discuss where they are needing this information relayed to - no answer; VM is full, unable to leave VM for patient's mother.

## 2023-07-27 NOTE — TELEPHONE ENCOUNTER
----- Message from Anne Wilson sent at 7/27/2023 11:49 AM CDT -----  Type: Needs Medical Advice  Who Called:  pt's mother  Best Call Back Number: [phone]  Additional Information: pt's mother is calling the office to see if the Dr is enrolled in [place] Medicaid as her home delivery care is stating that the Dr is not. The pt's mother is trying to make sure everything is covered. Please call back to advise Thanks!

## 2023-07-28 NOTE — TELEPHONE ENCOUNTER
Called and spoke with patient's mother, who advises patient will be seeing Dr. Bravo for the first time on 8/1/23 and is needing incontinence supply order form completed for patient following this visit.     Patient's mother inquiring if we have received a document for her daughter as of yet. Currently I am unsure of whether or not we have received anything for the patient. Advised mother to have them refax the form (gave patient's mother current fax number) with ATTN: Pat so that I can be watching for this document. I will inform Dr. Bravo that it is for the 8/1/23 appointment once received.

## 2023-08-01 ENCOUNTER — OFFICE VISIT (OUTPATIENT)
Dept: FAMILY MEDICINE | Facility: CLINIC | Age: 28
End: 2023-08-01
Payer: MEDICAID

## 2023-08-01 VITALS
SYSTOLIC BLOOD PRESSURE: 118 MMHG | DIASTOLIC BLOOD PRESSURE: 78 MMHG | TEMPERATURE: 98 F | HEART RATE: 77 BPM | RESPIRATION RATE: 12 BRPM

## 2023-08-01 DIAGNOSIS — L30.9 DERMATITIS: Primary | ICD-10-CM

## 2023-08-01 DIAGNOSIS — G91.9 HYDROCEPHALUS, UNSPECIFIED TYPE: ICD-10-CM

## 2023-08-01 DIAGNOSIS — N39.42 INCONTINENCE WITHOUT SENSORY AWARENESS: ICD-10-CM

## 2023-08-01 DIAGNOSIS — G80.2 SPASTIC HEMIPLEGIC CEREBRAL PALSY: ICD-10-CM

## 2023-08-01 DIAGNOSIS — M62.838 MUSCLE SPASTICITY: ICD-10-CM

## 2023-08-01 PROCEDURE — 1159F PR MEDICATION LIST DOCUMENTED IN MEDICAL RECORD: ICD-10-PCS | Mod: CPTII,,, | Performed by: FAMILY MEDICINE

## 2023-08-01 PROCEDURE — 99215 OFFICE O/P EST HI 40 MIN: CPT | Mod: ,,, | Performed by: FAMILY MEDICINE

## 2023-08-01 PROCEDURE — 3074F SYST BP LT 130 MM HG: CPT | Mod: CPTII,,, | Performed by: FAMILY MEDICINE

## 2023-08-01 PROCEDURE — 1159F MED LIST DOCD IN RCRD: CPT | Mod: CPTII,,, | Performed by: FAMILY MEDICINE

## 2023-08-01 PROCEDURE — 99215 PR OFFICE/OUTPT VISIT, EST, LEVL V, 40-54 MIN: ICD-10-PCS | Mod: ,,, | Performed by: FAMILY MEDICINE

## 2023-08-01 PROCEDURE — 3078F PR MOST RECENT DIASTOLIC BLOOD PRESSURE < 80 MM HG: ICD-10-PCS | Mod: CPTII,,, | Performed by: FAMILY MEDICINE

## 2023-08-01 PROCEDURE — 3078F DIAST BP <80 MM HG: CPT | Mod: CPTII,,, | Performed by: FAMILY MEDICINE

## 2023-08-01 PROCEDURE — 3074F PR MOST RECENT SYSTOLIC BLOOD PRESSURE < 130 MM HG: ICD-10-PCS | Mod: CPTII,,, | Performed by: FAMILY MEDICINE

## 2023-08-01 RX ORDER — MOMETASONE FUROATE 1 MG/G
CREAM TOPICAL
Qty: 45 G | Refills: 5 | Status: SHIPPED | OUTPATIENT
Start: 2023-08-01

## 2023-08-01 RX ORDER — NYSTATIN 100000 U/G
CREAM TOPICAL 2 TIMES DAILY
Qty: 30 G | Refills: 5 | Status: SHIPPED | OUTPATIENT
Start: 2023-08-01 | End: 2024-03-06 | Stop reason: SDUPTHER

## 2023-08-01 RX ORDER — MOMETASONE FUROATE 1 MG/ML
SOLUTION TOPICAL DAILY
Qty: 60 ML | Refills: 5 | Status: SHIPPED | OUTPATIENT
Start: 2023-08-01

## 2023-08-01 NOTE — PROGRESS NOTES
Subjective:       Patient ID: Santino Pompa is a 28 y.o. female.    Chief Complaint: Annual Exam    Ms. Pompa is a 28-year-old female with a history of hydrocephalus status post  shunt x3, nystagmus, spasticity, wheelchair bound , decreased mental functional capacity, spastic hemiplegic cerebral palsy, Crohn's disease and continuous leaking of urine.  She is here for her yearly visit and paperwork to be filled out to continue her home care.  Please see forms scanned into chart.  She also needs a certificate of medical necessity for her incontinence supplies      Review of Systems   Constitutional:         Wheelchair bound   Genitourinary:         Incontinent of urine   Skin:         Yeast in the gluteal folds   Neurological:         Hydrocephalus, epilepsy, intellectual disability, intracranial shunt with spastic hemiplegia and cerebral palsy   Psychiatric/Behavioral:          Nonverbal       Patient Active Problem List   Diagnosis    Hydrocephalus    Stiffness of right elbow joint    Stiffness of joint of right forearm    Stiffness of right wrist joint    Stiffness of right hand joint    Muscle spasticity    Decreased functional mobility    Stiff elbow, right    Stiff wrist, left    Spastic hemiplegic cerebral palsy    Right spastic hemiplegia    Psychophysiologic disorder    Intracranial shunt    Intellectual disability    Incontinence without sensory awareness    Epilepsy    Dental caries    Contracture, right elbow    Continuous leakage of urine    Crohn's disease of small and large intestines with complication    Seizures       Objective:      Physical Exam  Constitutional:       Comments: Wheelchair bound   HENT:      Head: Normocephalic and atraumatic.      Nose: Nose normal.   Eyes:      Extraocular Movements: Extraocular movements intact.      Pupils: Pupils are equal, round, and reactive to light.   Cardiovascular:      Rate and Rhythm: Normal rate and regular rhythm.      Pulses: Normal  pulses.      Heart sounds: Normal heart sounds.   Pulmonary:      Effort: Pulmonary effort is normal.      Breath sounds: Normal breath sounds.   Skin:     General: Skin is warm and dry.      Comments: Rash (eczema? )  on buttocks without breakdown and yeast in the gluteal fold   Neurological:      General: No focal deficit present.      Mental Status: She is alert and oriented to person, place, and time.   Psychiatric:         Mood and Affect: Mood normal.         Lab Results   Component Value Date    WBC 7.32 05/18/2022    HGB 11.6 (L) 05/18/2022    HCT 37.2 05/18/2022     05/18/2022    CHOL 140 03/28/2022    TRIG 50 03/28/2022    HDL 49 03/28/2022    ALT 18 05/18/2022    AST 18 05/18/2022     05/18/2022    K 3.6 05/18/2022     05/18/2022    CREATININE 0.7 05/18/2022    BUN 9 05/18/2022    CO2 26 05/18/2022    TSH 1.612 03/28/2022    INR 1.02 09/25/2012     The ASCVD Risk score (Brandie RICE, et al., 2019) failed to calculate for the following reasons:    The 2019 ASCVD risk score is only valid for ages 40 to 79  Visit Vitals  /78 (BP Location: Right arm, Patient Position: Sitting, BP Method: Large (Manual))   Pulse 77   Temp 98.4 °F (36.9 °C) (Oral)   Resp 12      Assessment:       1. Dermatitis    2. Hydrocephalus, unspecified type    3. Muscle spasticity    4. Spastic hemiplegic cerebral palsy    5. Incontinence without sensory awareness        Plan:       1. Dermatitis  -     mometasone (ELOCON) 0.1 % solution; Apply topically once daily.  Dispense: 60 mL; Refill: 5  -     nystatin (MYCOSTATIN) cream; Apply topically 2 (two) times daily.  Dispense: 30 g; Refill: 5    2. Hydrocephalus, unspecified type  Assessment & Plan:  Stable      3. Muscle spasticity  Assessment & Plan:  Wheelchair bound       4. Spastic hemiplegic cerebral palsy  Assessment & Plan:  Stable      5. Incontinence without sensory awareness  Assessment & Plan:   certificate of medical necessity for adult incontinence  supplies      Other orders  -     mometasone 0.1% (ELOCON) 0.1 % cream; Apply thin layer once daily to rash  Dispense: 45 g; Refill: 5       Follow up in about 1 year (around 8/1/2024), or if symptoms worsen or fail to improve.    In excessive of 40 minutes total time spent for evaluation and management services. Time included elements of the following: time spent preparing to see patient, obtaining and reviewing separately obtained history, exam, evaluation, counseling and education of patient/family member or care giver, documenting in the HMR, independently interpreting results and communication of results, coordination of care ordering medications, tests, or procedures, referral and communication to other health care professionals.        Future Appointments       Date Specialty Appt Notes    8/14/2023 Radiology Seizure-like activity [R56.9]

## 2023-08-03 ENCOUNTER — TELEPHONE (OUTPATIENT)
Dept: FAMILY MEDICINE | Facility: CLINIC | Age: 28
End: 2023-08-03
Payer: MEDICAID

## 2023-08-07 ENCOUNTER — TELEPHONE (OUTPATIENT)
Dept: FAMILY MEDICINE | Facility: CLINIC | Age: 28
End: 2023-08-07
Payer: MEDICAID

## 2023-08-07 NOTE — TELEPHONE ENCOUNTER
----- Message from Karen Carnes sent at 8/7/2023  2:27 PM CDT -----  Contact: Mom Francisca  Pts mother is calling in regards to the incontinence supplies that Dr Bravo had ordered and filled out the Medical necessity form, stated the company is saying Dr Bravo is not a [place] Medicaid provider and they cannot use her to supply these supplies. Is there anyway we can have another provider or a NP fill this out so they can receive these supplies? Can we check on this to see if she is enrolled or have the nurse sign off on this request and call Francisca (Mom) back to advise at [phone]. Thank You.

## 2023-08-07 NOTE — TELEPHONE ENCOUNTER
"Spoke with pt mother who stated " They said Dr. Bravo is not enrolled [place] Medicaid"     Contacted company [phone] spoke with Shanika who stated " Dr. Bravo is not in the system as enrolled [place] Medicaid and will need to be corrected through [place] Medicaid ".     Reached out to manager, Serina, regarding the matter.   "

## 2023-08-08 ENCOUNTER — TELEPHONE (OUTPATIENT)
Dept: FAMILY MEDICINE | Facility: CLINIC | Age: 28
End: 2023-08-08
Payer: MEDICAID

## 2023-08-08 NOTE — TELEPHONE ENCOUNTER
----- Message from Beckie Soriano LPN sent at 8/8/2023  2:31 PM CDT -----  Regarding: FW: MEDICAL SUPPLIES  Contact: LUDWIN HOLMAN  -  [phone]    ----- Message -----  From: Gladis Munguia  Sent: 8/8/2023   2:31 PM CDT  To: Marco MEEK Staff  Subject: MEDICAL SUPPLIES                                   Type: Needs Medical Advice      Who Called:  LUDWIN HOLMAN  -      Best Call Back Number: [phone]    Additional Information: Patient mother is calling to speak with nurse/MA regarding incontinent supplies (diapers). Mother stated NP can sign for medial supplies.  Please call back and advise. Thanks

## 2023-08-08 NOTE — TELEPHONE ENCOUNTER
Returned call to pt mother regarding medicaid number for provider issue. Management has been notified.

## 2023-08-14 ENCOUNTER — HOSPITAL ENCOUNTER (OUTPATIENT)
Dept: RADIOLOGY | Facility: HOSPITAL | Age: 28
Discharge: HOME | End: 2023-08-14
Attending: NURSE PRACTITIONER
Payer: MEDICAID

## 2023-08-14 DIAGNOSIS — R56.9 SEIZURE-LIKE ACTIVITY: ICD-10-CM

## 2023-08-14 PROCEDURE — 70551 MRI BRAIN STEM W/O DYE: CPT | Mod: 26,,, | Performed by: STUDENT IN AN ORGANIZED HEALTH CARE EDUCATION/TRAINING PROGRAM

## 2023-08-14 PROCEDURE — 70551 MRI BRAIN STEM W/O DYE: CPT | Mod: TC

## 2023-08-14 PROCEDURE — 70551 MRI BRAIN WITHOUT CONTRAST: ICD-10-PCS | Mod: 26,,, | Performed by: STUDENT IN AN ORGANIZED HEALTH CARE EDUCATION/TRAINING PROGRAM

## 2023-08-15 ENCOUNTER — TELEPHONE (OUTPATIENT)
Dept: FAMILY MEDICINE | Facility: CLINIC | Age: 28
End: 2023-08-15
Payer: MEDICAID

## 2023-08-17 ENCOUNTER — TELEPHONE (OUTPATIENT)
Dept: FAMILY MEDICINE | Facility: CLINIC | Age: 28
End: 2023-08-17
Payer: MEDICAID

## 2023-08-17 NOTE — TELEPHONE ENCOUNTER
"This is not something that we just "send an order" for. [facility] would need to submit an order form over to us for provider to complete and fax back.     Called [facility] - spoke with staff member, Jay. He advises that the patient's mother just created the account here within the last few minutes and that a fax was submitted to our office to complete 8 minutes ago. Was able to locate the fax and will give to provider so may be completed.   "

## 2023-08-17 NOTE — TELEPHONE ENCOUNTER
----- Message from Yonny Yao sent at 8/17/2023  1:38 PM CDT -----  Contact: Self  Type: Needs Medical Advice  Who Called:  /Francisca Waterman Call Back Number: [phone]   Additional Information:  States she spoke with [employer] [facility] for incontinence supplies. States she needs a prescription sent.

## 2023-08-18 NOTE — TELEPHONE ENCOUNTER
Submitted completed incontinence order form to [facility]. Entered original document for scanning into patient's chart.

## 2023-08-22 ENCOUNTER — TELEPHONE (OUTPATIENT)
Dept: FAMILY MEDICINE | Facility: CLINIC | Age: 28
End: 2023-08-22
Payer: MEDICAID

## 2023-08-22 DIAGNOSIS — R30.0 DYSURIA: Primary | ICD-10-CM

## 2023-08-22 NOTE — TELEPHONE ENCOUNTER
----- Message from Alessia Stoll sent at 8/22/2023 10:09 AM CDT -----  Contact: pt  Type: Needs Medical Advice         Who Called: pt mother - sandra Waterman Call Back Number:[phone]  Additional Information: Requesting a call back regarding  mother following up on the paperwork for the incontinence supplies to continue receiving support service for Santino. Mother asking for office to call her. Mother said [facility] is waiting for the orders/rx for her supplies.     Pt also is having issues urination, Pt mother is asking if office can fit her in today. Pt might have a UTI          Please Advise- Thank you

## 2023-08-22 NOTE — TELEPHONE ENCOUNTER
Mom states patient had MRI and they informed her she might have a UTI, mom wants order for urinalysis and she can obtain it at home, will gather equipment such as cup and specimen hat for her to collect, I will get order from [facility] when she returns tomorrow, also informed Mom that paper work for incontinent supplies was faxed on 8/17 to [employer].

## 2023-08-23 ENCOUNTER — LAB VISIT (OUTPATIENT)
Dept: LAB | Facility: CLINIC | Age: 28
End: 2023-08-23
Payer: MEDICAID

## 2023-08-23 DIAGNOSIS — R30.0 DYSURIA: ICD-10-CM

## 2023-08-23 LAB
BILIRUB UR QL STRIP: NEGATIVE
CLARITY UR: CLEAR
COLOR UR: YELLOW
GLUCOSE UR QL STRIP: NEGATIVE
HGB UR QL STRIP: NEGATIVE
KETONES UR QL STRIP: NEGATIVE
LEUKOCYTE ESTERASE UR QL STRIP: NEGATIVE
NITRITE UR QL STRIP: NEGATIVE
PH UR STRIP: 7 [PH] (ref 5–8)
PROT UR QL STRIP: NEGATIVE
SP GR UR STRIP: 1.02 (ref 1–1.03)
URN SPEC COLLECT METH UR: NORMAL
UROBILINOGEN UR STRIP-ACNC: NEGATIVE EU/DL

## 2023-08-23 PROCEDURE — 81003 URINALYSIS AUTO W/O SCOPE: CPT | Performed by: FAMILY MEDICINE

## 2023-08-30 ENCOUNTER — TELEPHONE (OUTPATIENT)
Dept: FAMILY MEDICINE | Facility: CLINIC | Age: 28
End: 2023-08-30

## 2023-08-30 ENCOUNTER — TELEPHONE (OUTPATIENT)
Dept: NEUROSURGERY | Facility: CLINIC | Age: 28
End: 2023-08-30
Payer: MEDICAID

## 2023-08-30 NOTE — TELEPHONE ENCOUNTER
----- Message from Carissa Donaldson sent at 8/30/2023  9:24 AM CDT -----  Regarding: appt /follow up  Contact: @ [phone]  Pt mother requesting a appointment for the following as a transfer of care the previous doctor  is not long there  ...Please call and adv @ [phone]

## 2023-08-30 NOTE — TELEPHONE ENCOUNTER
----- Message from Gladis Munguia sent at 8/30/2023 11:31 AM CDT -----  Contact: LUDWIN AZEVEDO -  [phone]  Type:  Diabetic/Medical Supplies Request        Name of Caller:  LUDWIN AZEVEDO - mother     What supplies are needed:  DIAPERS     What is the brand of the supplies:  N/A    Pharmacy/Company Name, Phone #, Location:  [facility]     Requesting a Call Back:  YES     Best Call Back Number:  [phone]      Additional Information:  Advised patient is not registered [place] Medicaid.  Informed to call medicaid to have patient registered.   Please call back and advise. Thanks

## 2023-08-30 NOTE — TELEPHONE ENCOUNTER
Spoke with mother Francisca, she states our office needs to call the office of [place] medicaid per the medicaid office . [facility] Dr Arlene Bravo is not registered therefore this patient can not get her supplies and pt is in desperate need of her diapers etc.

## 2023-08-30 NOTE — TELEPHONE ENCOUNTER
S/W pt mom. They are looking to reestablish care with neurosurgery as it has been so long since they previously saw one. Inquired about neuro ophthalmology, give Dr. Kaufman's name. Offered an appointment with Dr. Olivas as they saw him previously, but pt mom declined. Informed me her other daughter sees Dr. Spaulding and they like his care. Offered an appointment with Dr. Spaulding's PA, but pt mom declined and wants to see Dr. Spaulding. Will give pt a call back with appointment.

## 2023-08-31 ENCOUNTER — TELEPHONE (OUTPATIENT)
Dept: NEUROSURGERY | Facility: CLINIC | Age: 28
End: 2023-08-31
Payer: MEDICAID

## 2023-09-19 ENCOUNTER — TELEPHONE (OUTPATIENT)
Dept: FAMILY MEDICINE | Facility: CLINIC | Age: 28
End: 2023-09-19
Payer: MEDICAID

## 2023-11-16 ENCOUNTER — TELEPHONE (OUTPATIENT)
Dept: FAMILY MEDICINE | Facility: CLINIC | Age: 28
End: 2023-11-16
Payer: MEDICAID

## 2023-11-16 NOTE — TELEPHONE ENCOUNTER
Spoke with pt mom. Advised mom to have [employer] ref[facility] to fax # [phone]. Mom voiced understanding.

## 2023-11-16 NOTE — TELEPHONE ENCOUNTER
----- Message from Romy Marrero sent at 11/16/2023 12:03 PM CST -----  Contact: Francisca  Type: Needs Medical Advice    Who Called: Francisca/ Pt Mother  Best Call Back Number: [phone]  Additional  Information: Requesting a call back to discuss paperwork faxed over [facility], to be signed by provider and sent back.  For pt  to get diapers, mother states documents were faxed on 11/2/23   Please Advise- Thank you

## 2024-02-29 ENCOUNTER — TELEPHONE (OUTPATIENT)
Dept: FAMILY MEDICINE | Facility: CLINIC | Age: 29
End: 2024-02-29
Payer: MEDICAID

## 2024-02-29 NOTE — TELEPHONE ENCOUNTER
----- Message from Pito Chan sent at 2/29/2024 10:33 AM CST -----  Contact: Mom  Type:  Sooner Apoointment Request    Caller is requesting a sooner appointment.  Caller declined first available appointment listed below.  Caller will not accept being placed on the waitlist and is requesting a message be sent to doctor.  Name of Caller:Francisca Chu  When is the first available appointment?04/10  Symptoms: rash on buttocks  Would the patient rather a call back or a response via [facility]? call back/  Best Call Back Number:[phone]   Additional Information: Thanks

## 2024-03-06 ENCOUNTER — OFFICE VISIT (OUTPATIENT)
Dept: FAMILY MEDICINE | Facility: CLINIC | Age: 29
End: 2024-03-06
Payer: MEDICAID

## 2024-03-06 VITALS
SYSTOLIC BLOOD PRESSURE: 121 MMHG | BODY MASS INDEX: 29.29 KG/M2 | OXYGEN SATURATION: 99 % | DIASTOLIC BLOOD PRESSURE: 82 MMHG | HEIGHT: 60 IN | HEART RATE: 81 BPM | RESPIRATION RATE: 16 BRPM

## 2024-03-06 DIAGNOSIS — F79 INTELLECTUAL DISABILITY: ICD-10-CM

## 2024-03-06 DIAGNOSIS — R56.9 SEIZURES: ICD-10-CM

## 2024-03-06 DIAGNOSIS — R26.89 DECREASED FUNCTIONAL MOBILITY: ICD-10-CM

## 2024-03-06 DIAGNOSIS — H53.9 VISION CHANGES: ICD-10-CM

## 2024-03-06 DIAGNOSIS — Z98.2 INTRACRANIAL SHUNT: ICD-10-CM

## 2024-03-06 DIAGNOSIS — N39.42 INCONTINENCE WITHOUT SENSORY AWARENESS: Primary | ICD-10-CM

## 2024-03-06 DIAGNOSIS — G80.2 SPASTIC HEMIPLEGIC CEREBRAL PALSY: ICD-10-CM

## 2024-03-06 DIAGNOSIS — B37.2 CANDIDAL INTERTRIGO: ICD-10-CM

## 2024-03-06 DIAGNOSIS — G91.9 HYDROCEPHALUS, UNSPECIFIED TYPE: ICD-10-CM

## 2024-03-06 DIAGNOSIS — L30.9 DERMATITIS: ICD-10-CM

## 2024-03-06 DIAGNOSIS — G81.11 RIGHT SPASTIC HEMIPLEGIA: ICD-10-CM

## 2024-03-06 DIAGNOSIS — G40.909 NONINTRACTABLE EPILEPSY WITHOUT STATUS EPILEPTICUS, UNSPECIFIED EPILEPSY TYPE: ICD-10-CM

## 2024-03-06 PROCEDURE — 99999 PR PBB SHADOW E&M-EST. PATIENT-LVL V: CPT | Mod: PBBFAC,,, | Performed by: STUDENT IN AN ORGANIZED HEALTH CARE EDUCATION/TRAINING PROGRAM

## 2024-03-06 PROCEDURE — 99215 OFFICE O/P EST HI 40 MIN: CPT | Mod: PBBFAC,PN | Performed by: STUDENT IN AN ORGANIZED HEALTH CARE EDUCATION/TRAINING PROGRAM

## 2024-03-06 PROCEDURE — 3008F BODY MASS INDEX DOCD: CPT | Mod: CPTII,,, | Performed by: STUDENT IN AN ORGANIZED HEALTH CARE EDUCATION/TRAINING PROGRAM

## 2024-03-06 PROCEDURE — 3074F SYST BP LT 130 MM HG: CPT | Mod: CPTII,,, | Performed by: STUDENT IN AN ORGANIZED HEALTH CARE EDUCATION/TRAINING PROGRAM

## 2024-03-06 PROCEDURE — 3079F DIAST BP 80-89 MM HG: CPT | Mod: CPTII,,, | Performed by: STUDENT IN AN ORGANIZED HEALTH CARE EDUCATION/TRAINING PROGRAM

## 2024-03-06 PROCEDURE — 99214 OFFICE O/P EST MOD 30 MIN: CPT | Mod: S$PBB,,, | Performed by: STUDENT IN AN ORGANIZED HEALTH CARE EDUCATION/TRAINING PROGRAM

## 2024-03-06 PROCEDURE — 1159F MED LIST DOCD IN RCRD: CPT | Mod: CPTII,,, | Performed by: STUDENT IN AN ORGANIZED HEALTH CARE EDUCATION/TRAINING PROGRAM

## 2024-03-06 RX ORDER — LEVETIRACETAM 100 MG/ML
1500 SOLUTION ORAL 2 TIMES DAILY
Qty: 900 ML | Refills: 0 | Status: SHIPPED | OUTPATIENT
Start: 2024-03-06 | End: 2024-03-08 | Stop reason: SDUPTHER

## 2024-03-06 RX ORDER — CICLOPIROX 7.7 MG/G
GEL TOPICAL
Qty: 100 G | Refills: 3 | Status: SHIPPED | OUTPATIENT
Start: 2024-03-06

## 2024-03-06 RX ORDER — NYSTATIN 100000 U/G
CREAM TOPICAL 2 TIMES DAILY
Qty: 30 G | Refills: 5 | Status: SHIPPED | OUTPATIENT
Start: 2024-03-06

## 2024-03-06 RX ORDER — CICLOPIROX 1 G/100ML
SHAMPOO TOPICAL
Qty: 120 ML | Refills: 3 | Status: SHIPPED | OUTPATIENT
Start: 2024-03-06

## 2024-03-06 RX ORDER — NYSTATIN 100000 [USP'U]/G
POWDER TOPICAL 2 TIMES DAILY
Qty: 60 G | Refills: 1 | Status: SHIPPED | OUTPATIENT
Start: 2024-03-06

## 2024-03-06 NOTE — ASSESSMENT & PLAN NOTE
Uses diapers as she is not able to position herself. She is not able to use regular toilet ing facilities. She is not aware of the incontinence and is not able to hold bowel or bladder

## 2024-03-06 NOTE — PROGRESS NOTES
Subjective:       Patient ID: Santino Pompa is a 29 y.o. female.    Chief Complaint: Rash (Rash started 2 months ago and is spreading )    Patient Active Problem List:     Hydrocephalus     Stiffness of right elbow joint     Stiffness of joint of right forearm     Stiffness of right wrist joint     Stiffness of right hand joint     Muscle spasticity     Decreased functional mobility     Stiff elbow, right     Stiff wrist, left     Spastic hemiplegic cerebral palsy     Right spastic hemiplegia     Psychophysiologic disorder     Intracranial shunt     Intellectual disability     Incontinence without sensory awareness     Epilepsy     Dental caries     Contracture, right elbow     Continuous leakage of urine     Crohn's disease of small and large intestines with complication     Seizures    Current Outpatient Medications:  adalimumab (HUMIRA) 40 mg/0.8 mL injection, Inject 40 mg into the skin.  baclofen (LIORESAL) 20 MG tablet, Take 20 mg by mouth 3 (three) times daily.  clotrimazole (LOTRIMIN) 1 % cream, Apply topically 2 (two) times daily. Please compound with 1 tube A & D ointment, 1 tube Desitin Creamy with 40% zinc oxide, and 1/2 cup of liquid maalox to make magic butt cream.  fluticasone propionate (FLONASE) 50 mcg/actuation nasal spray, Every shift  lactulose (CHRONULAC) 10 gram/15 mL solution, Take by mouth 3 (three) times daily.  mometasone (ELOCON) 0.1 % solution, Apply topically once daily.  mometasone 0.1% (ELOCON) 0.1 % cream, Apply thin layer once daily to rash  mupirocin (BACTROBAN) 2 % ointment, mupirocin 2 % topical ointment   Apply by topical route to affected area BID  ondansetron (ZOFRAN-ODT) 4 MG TbDL, Take 1 tablet (4 mg total) by mouth every 6 (six) hours as needed.  promethazine HCl (PHENERGAN ORAL), Take by mouth. PRN  triamcinolone acetonide 0.5% (KENALOG) 0.5 % Crea, APPLY TOPICALLY TO BACK OF NECK TWICE DAILY FOR RASH  ciclopirox 0.77 % Gel, Apply to affected area twice daily for 4  weeks  ciclopirox 1 % shampoo, Wash to the affected area and allow to sit 5 minutes prior to rinsing. Use three times a week.  levetiracetam 500 mg/5 mL (5 mL) Soln, Take 15 mLs (1,500 mg total) by mouth 2 (two) times daily.  nystatin (MYCOSTATIN) cream, Apply topically 2 (two) times daily.  nystatin (MYCOSTATIN) powder, Apply topically 2 (two) times daily.    No current facility-administered medications for this visit.          Review of Systems   Constitutional:  Negative for activity change and appetite change.   Respiratory:  Negative for shortness of breath.    Cardiovascular:  Negative for chest pain.   Gastrointestinal:  Negative for abdominal pain and anal bleeding.   Genitourinary:  Negative for dysuria.   Integumentary:  Negative for rash.   Psychiatric/Behavioral:  Negative for dysphoric mood and sleep disturbance. The patient is not nervous/anxious.          Objective:      Physical Exam  Constitutional:       General: She is not in acute distress.     Appearance: Normal appearance. She is not ill-appearing.      Comments: With mom in room   Eyes:      Conjunctiva/sclera: Conjunctivae normal.      Pupils: Pupils are equal, round, and reactive to light.   Cardiovascular:      Rate and Rhythm: Normal rate and regular rhythm.      Heart sounds: Normal heart sounds. No murmur heard.  Pulmonary:      Effort: Pulmonary effort is normal. No respiratory distress.      Breath sounds: Normal breath sounds. No wheezing, rhonchi or rales.   Musculoskeletal:      Right lower leg: No edema.      Left lower leg: No edema.   Skin:     General: Skin is warm and dry.      Comments: Has some candidal intertrigo on her buttocks   Neurological:      Mental Status: She is alert. Mental status is at baseline.      Gait: Gait abnormal (wheelchair use).      Comments: Wheelchair bound from limited mobility, cerebral palsy, spasticity   Psychiatric:         Mood and Affect: Mood normal.      Comments: Mental handicap.  Does not  speak but is very pleasant         Assessment:       1. Incontinence without sensory awareness    2. Spastic hemiplegic cerebral palsy    3. Intellectual disability    4. Vision changes    5. Seizures    6. Intracranial shunt    7. Right spastic hemiplegia    8. Hydrocephalus, unspecified type    9. Dermatitis    10. Candidal intertrigo    11. Nonintractable epilepsy without status epilepticus, unspecified epilepsy type    12. Decreased functional mobility        Plan:       Problem List Items Addressed This Visit          Neuro    Hydrocephalus     Needs to follow with neurosurgery         Relevant Orders    Ambulatory referral/consult to Neurosurgery    Spastic hemiplegic cerebral palsy     Stable. Continue current medications and regular followup.           Relevant Orders    Ambulatory referral/consult to Neurology    Right spastic hemiplegia     Stable. Continue current medications and regular followup.           Relevant Orders    Ambulatory referral/consult to Neurology    Intracranial shunt     Neurosurgery referral         Relevant Orders    Ambulatory referral/consult to Neurosurgery    Intellectual disability     Stable. Continue current regimen and regular followup.           Epilepsy     Stable. Continue current medications and regular followup.  Needs a new neurology team         Seizures     Stable. Continue current medications and regular followup.           Relevant Medications    levetiracetam 500 mg/5 mL (5 mL) Soln    Other Relevant Orders    Ambulatory referral/consult to Neurology    Ambulatory referral/consult to Neurosurgery       Renal/    Incontinence without sensory awareness - Primary     Uses diapers as she is not able to position herself. She is not able to use regular toilet ing facilities. She is not aware of the incontinence and is not able to hold bowel or bladder            Other    Decreased functional mobility     In a wheelchair  Stable overall          Other Visit Diagnoses        Vision changes        Relevant Orders    Ambulatory referral/consult to Ophthalmology    Dermatitis        Relevant Medications    nystatin (MYCOSTATIN) cream    ciclopirox 1 % shampoo    ciclopirox 0.77 % Gel    Other Relevant Orders    Ambulatory referral/consult to Dermatology    Candidal intertrigo        Drying and nystatin powder    Relevant Medications    nystatin (MYCOSTATIN) powder

## 2024-03-08 DIAGNOSIS — R56.9 SEIZURES: ICD-10-CM

## 2024-03-08 RX ORDER — LEVETIRACETAM 100 MG/ML
1500 SOLUTION ORAL 2 TIMES DAILY
Qty: 900 ML | Refills: 0 | Status: SHIPPED | OUTPATIENT
Start: 2024-03-08 | End: 2024-03-28 | Stop reason: SDUPTHER

## 2024-03-08 NOTE — TELEPHONE ENCOUNTER
Refill Routing Note   Medication(s) are not appropriate for processing by [facility] for the following reason(s):        Outside of protocol    [facility] action(s):  Route             Appointments  past 12m or future 3m with PCP    Date Provider   Last Visit   3/6/2024 Alysha Lara MD   Next Visit   4/10/2024 Alysha Lara MD   ED visits in past 90 days: 0        Note composed:9:43 AM 03/08/2024

## 2024-03-18 ENCOUNTER — PATIENT MESSAGE (OUTPATIENT)
Dept: FAMILY MEDICINE | Facility: CLINIC | Age: 29
End: 2024-03-18
Payer: MEDICAID

## 2024-03-18 ENCOUNTER — TELEPHONE (OUTPATIENT)
Dept: FAMILY MEDICINE | Facility: CLINIC | Age: 29
End: 2024-03-18
Payer: MEDICAID

## 2024-03-18 DIAGNOSIS — L30.9 DERMATITIS: Primary | ICD-10-CM

## 2024-03-18 NOTE — TELEPHONE ENCOUNTER
I filled this out last week I believe.  Victoria has located it and has faxed it before but will fax again.  Forwarding to her for documentation of repeat fax.  Dr. Lara

## 2024-03-18 NOTE — TELEPHONE ENCOUNTER
----- Message from Victoria Rene MA sent at 3/15/2024  5:10 PM CDT -----  Regarding: FW: advise  Contact: patient  I faxed the incontinence supplies request form on 3/6/24. Have not received anything else at this time.  ----- Message -----  From: Beckie Soriano LPN  Sent: 3/15/2024   3:19 PM CDT  To: Victoria Rene MA  Subject: FW: advise                                       Received?  ----- Message -----  From: Kaylee Ramsay  Sent: 3/15/2024   3:08 PM CDT  To: Marco MEEK Staff  Subject: advise                                           Type: Needs Medical Advice  Who Called:  nabeel   Symptoms (please be specific):    How long has patient had these symptoms:    Pharmacy name and phone #:    Best Call Back Number: [phone]  Additional Information[facility]: [phone]     Nabeel

## 2024-03-19 ENCOUNTER — TELEPHONE (OUTPATIENT)
Dept: FAMILY MEDICINE | Facility: CLINIC | Age: 29
End: 2024-03-19
Payer: MEDICAID

## 2024-03-19 ENCOUNTER — TELEPHONE (OUTPATIENT)
Dept: DERMATOLOGY | Facility: CLINIC | Age: 29
End: 2024-03-19
Payer: MEDICAID

## 2024-03-19 NOTE — TELEPHONE ENCOUNTER
Attempted to contact patient, no answer, left detailed message - no new medicaid at this time.     ----- Message from Liseth Singh sent at 3/19/2024 11:32 AM CDT -----  Contact: mother  Type:  Sooner Appointment Request    Caller is requesting a sooner appointment.  Caller declined first available appointment listed below.  Caller will not accept being placed on the waitlist and is requesting a message be sent to doctor.    Name of Caller:  Francisca/Mother  When is the first available appointment?  N/a  Symptoms:  Dermatitis  Would the patient rather a call back or a response via [facility]? Call back   Best Call Back Number:  [phone]    Additional Information:  States she would like to franklyn pt an appt and has referral.Please call back

## 2024-03-19 NOTE — TELEPHONE ENCOUNTER
Called [facility], they claim they still did not receive the fax. I got a second fax number and re faxed the paperwork for the incontinence supplies to [phone].  Victoria Rene MA 03/19/2024 7:38 AM

## 2024-03-19 NOTE — TELEPHONE ENCOUNTER
Called pt back per request to let her know external referral has been placed and see who they would like to see locally.

## 2024-03-28 ENCOUNTER — OFFICE VISIT (OUTPATIENT)
Dept: NEUROLOGY | Facility: CLINIC | Age: 29
End: 2024-03-28
Payer: MEDICAID

## 2024-03-28 VITALS
DIASTOLIC BLOOD PRESSURE: 75 MMHG | BODY MASS INDEX: 29.29 KG/M2 | SYSTOLIC BLOOD PRESSURE: 116 MMHG | HEART RATE: 80 BPM | HEIGHT: 60 IN

## 2024-03-28 DIAGNOSIS — G81.11 RIGHT SPASTIC HEMIPLEGIA: ICD-10-CM

## 2024-03-28 DIAGNOSIS — G80.2 SPASTIC HEMIPLEGIC CEREBRAL PALSY: ICD-10-CM

## 2024-03-28 DIAGNOSIS — R56.9 SEIZURES: ICD-10-CM

## 2024-03-28 PROCEDURE — 99999 PR PBB SHADOW E&M-EST. PATIENT-LVL IV: CPT | Mod: PBBFAC,,, | Performed by: INTERNAL MEDICINE

## 2024-03-28 PROCEDURE — 3078F DIAST BP <80 MM HG: CPT | Mod: CPTII,,, | Performed by: INTERNAL MEDICINE

## 2024-03-28 PROCEDURE — 3008F BODY MASS INDEX DOCD: CPT | Mod: CPTII,,, | Performed by: INTERNAL MEDICINE

## 2024-03-28 PROCEDURE — 3074F SYST BP LT 130 MM HG: CPT | Mod: CPTII,,, | Performed by: INTERNAL MEDICINE

## 2024-03-28 PROCEDURE — 99205 OFFICE O/P NEW HI 60 MIN: CPT | Mod: S$PBB,,, | Performed by: INTERNAL MEDICINE

## 2024-03-28 PROCEDURE — 99214 OFFICE O/P EST MOD 30 MIN: CPT | Mod: PBBFAC | Performed by: INTERNAL MEDICINE

## 2024-03-28 PROCEDURE — 1159F MED LIST DOCD IN RCRD: CPT | Mod: CPTII,,, | Performed by: INTERNAL MEDICINE

## 2024-03-28 RX ORDER — CENOBAMATE 100 MG/1
100 TABLET, FILM COATED ORAL NIGHTLY
COMMUNITY
End: 2024-03-28 | Stop reason: SDUPTHER

## 2024-03-28 RX ORDER — LEVETIRACETAM 100 MG/ML
1500 SOLUTION ORAL 2 TIMES DAILY
Qty: 900 ML | Refills: 6 | Status: SHIPPED | OUTPATIENT
Start: 2024-03-28 | End: 2025-04-17

## 2024-03-28 RX ORDER — CENOBAMATE 100 MG/1
100 TABLET, FILM COATED ORAL NIGHTLY
Qty: 60 TABLET | Refills: 3 | Status: SHIPPED | OUTPATIENT
Start: 2024-03-28 | End: 2024-06-07 | Stop reason: SDUPTHER

## 2024-03-28 NOTE — PROGRESS NOTES
GENERAL NEUROLOGY VISIT   3/28/2024  History:    Patient is a 29 y.o. female with past medical history of cerebral palsy, hydrocephalus s/p  shunt, seizure disorder presenting to the clinic for establishment of care. Patient is here with her mother today.  Patient was seeing another provider in [place] but mother wants to set up care in [facility].    Patient's mother states that patient was born premature at 23 weeks.  She had intraventricular hemorrhage and meningitis at 3 months and had shunt placed at that time.  Mother states that seizures started around this time as well.  Mother states that patient has always had focal motor seizures with horizontal movement of the eyes and right-sided facial twitch.  Usually events lasts for on 10 minutes with reduced mental status.  No loss of consciousness is noted, no tongue bite or bowel incontinence.  Wears diapers, unclear about bladder incontinence.  Patient has never had GTC, never had to be intubated for status.  She usually gets 1 event in couple of months, says frequency is better after starting Xcopri which was added on couple of months back as patient was having increased seizure frequency on Keppra.  Does not cluster.  Mother reports compliance to medication, tolerating medications well.  Possibly sees correlation of breakthrough seizures when patient is hypoglycemic.    Past ASMs:  Phenobarbital  Current ASM:  Keppra 1500 mg b.i.d., Xcopri 100 mg nightly    Of note, patient gets Botox for spasms in [place] currently, which is also being transitioned and has an appointment on 04/25 to establish with Dr. Jones.  Patient sleeps well, spends most of the time at home.  She used to go to special school pre COVID.  Patient has no trouble with agitation and frustration.  Also being set up with Neurosurgery in [facility] and MRI brain was obtained in Aug 2023 however images were motion degraded and nondiagnostic.    Past Medical History:   Diagnosis Date     Anticoagulant long-term use     Blood transfusion     Hydrocephalus     Has 2 shunts    PDA (patent ductus arteriosus) At birth    repaired    Seizures        Past Surgical History:   Procedure Laterality Date    BRAIN SURGERY      CARDIAC SURGERY      EYE SURGERY         Social History     Socioeconomic History    Marital status: Single   Tobacco Use    Smoking status: Never    Smokeless tobacco: Never   Substance and Sexual Activity    Alcohol use: Never    Drug use: Never    Sexual activity: Never     Social Determinants of Health     Financial Resource Strain: High Risk (3/27/2024)    Overall Financial Resource Strain (CARDIA)     Difficulty of Paying Living Expenses: Very hard   Food Insecurity: No Food Insecurity (3/27/2024)    Hunger Vital Sign     Worried About Running Out of Food in the Last Year: Never true     Ran Out of Food in the Last Year: Never true   Transportation Needs: Unmet Transportation Needs (3/27/2024)    PRAPARE - Transportation     Lack of Transportation (Medical): No     Lack of Transportation (Non-Medical): Yes   Physical Activity: Inactive (3/27/2024)    Exercise Vital Sign     Days of Exercise per Week: 0 days     Minutes of Exercise per Session: 0 min   Stress: No Stress Concern Present (3/27/2024)    [employer] - Occupational Stress Questionnaire     Feeling of Stress : Not at all   Social Connections: Unknown (3/27/2024)    Social Connection and Isolation Panel [NHANES]     Frequency of Communication with Friends and Family: Once a week     Frequency of Social Gatherings with Friends and Family: Once a week     Active Member of Clubs or Organizations: Yes     Attends Club or Organization Meetings: Never     Marital Status: Living with partner   Housing Stability: High Risk (3/27/2024)    Housing Stability Vital Sign     Unable to Pay for Housing in the Last Year: Yes     Number of Places Lived in the Last Year: 1     Unstable Housing in the Last  Year: No       Review of patient's allergies indicates:   Allergen Reactions    Latex, natural rubber Swelling    Demerol [meperidine]        Current Outpatient Medications on File Prior to Visit   Medication Sig Dispense Refill    adalimumab (HUMIRA) 40 mg/0.8 mL injection Inject 40 mg into the skin.      baclofen (LIORESAL) 20 MG tablet Take 20 mg by mouth 3 (three) times daily.      ciclopirox 0.77 % Gel Apply to affected area twice daily for 4 weeks 100 g 3    ciclopirox 1 % shampoo Wash to the affected area and allow to sit 5 minutes prior to rinsing. Use three times a week. 120 mL 3    clotrimazole (LOTRIMIN) 1 % cream Apply topically 2 (two) times daily. Please compound with 1 tube A & D ointment, 1 tube Desitin Creamy with 40% zinc oxide, and 1/2 cup of liquid maalox to make magic butt cream. 45 g 3    fluticasone propionate (FLONASE) 50 mcg/actuation nasal spray Every shift      lactulose (CHRONULAC) 10 gram/15 mL solution Take by mouth 3 (three) times daily.      levetiracetam 500 mg/5 mL (5 mL) Soln Take 15 mLs (1,500 mg total) by mouth 2 (two) times daily. 900 mL 0    mometasone (ELOCON) 0.1 % solution Apply topically once daily. 60 mL 5    mometasone 0.1% (ELOCON) 0.1 % cream Apply thin layer once daily to rash 45 g 5    mupirocin (BACTROBAN) 2 % ointment mupirocin 2 % topical ointment   Apply by topical route to affected area BID      nystatin (MYCOSTATIN) cream Apply topically 2 (two) times daily. 30 g 5    nystatin (MYCOSTATIN) powder Apply topically 2 (two) times daily. 60 g 1    ondansetron (ZOFRAN-ODT) 4 MG TbDL Take 1 tablet (4 mg total) by mouth every 6 (six) hours as needed. 12 tablet 0    promethazine HCl (PHENERGAN ORAL) Take by mouth. PRN      triamcinolone acetonide 0.5% (KENALOG) 0.5 % Crea APPLY TOPICALLY TO BACK OF NECK TWICE DAILY FOR RASH 15 g 3     No current facility-administered medications on file prior to visit.        Family history:  Non contributary    Review Of Systems      Constitutional Negative for fevers, chills, weigh loss   HEENT Negative for hearing loss, dysphagia, sore throat, diplopia   Respiratory Negative for shortness of breath, cough    Cardiovascular Negative for chest pain, palpitations    Gastrointestinal Negative for constipation, diarrhea, early satiety    Skin Negative for rashes    Musculoskeletal Negative for joint pains, myalgias.   Neurological See Above    Psychological Negative for sleep disturbances.    Heme/Lymph Negative for easy bruising, easy bleeding    Endocrine Negative for polyuria, polydypsia     Physical Exam:     Physical Examination  /75 (BP Location: Left arm, Patient Position: Sitting, BP Method: Large (Automatic))   Pulse 80   Ht 5' (1.524 m)   BMI 29.29 kg/m²   Body mass index is 29.29 kg/m².      Neurological Exam  Mental Status:   Awake and alert  Able to track, follow simple commands, has a social smile    CN:   II, III, IV, VI:  EOMI grossly normal.VII: facial droop on the right      Motor:   Spontaneously moving all extremities and to commands.   Increased tone in the right upper and lower extremity                Reflexes:    Bicep Tricep Brachioradial Patellar Achilles   Left 2+ 2+ 2+ 2+ 1+   Right 2+ 2+ 2+ 2+ 1+   No Clonus, down going toes b/l.    Sensation: on both UEs and LEs    Responds to touch in all 4 extremities    Coordination:    Tremor: Absent.    Gait:    Not tested    Interval/Previous Work-up:   EEG 2022: This is an abnormal EEG due to diffuse slowing of the background activity consistent with a moderate to severe encephalopathy. Left hemispheric slowing may be indicative of focal cerebral dysfunction, correlate clinically. Sharp wave discharges with epileptogenic potential were seen, but no seizures were recorded in this study.     Impression:   #focal seizures with impaired awareness  Seizures since birth.  Seizures with right-sided facial twitching and horizontal eye movements.  Never had GTCs.  Had been  maintained well on Keppra for a long time however recent increase in seizure frequency without change in semiology leading to addition of Xcopri couple of months back.  Patient has now been doing well on this.    Plan:   - continue Keppra 1500 mg b.i.d., (5 mg soln)  - continue Xcopri 100 mg nightly  - ordered MRI brain without contrast per mother's request for Neurosurgery review      RTC 1 yr or sooner if needed    Time spent on this encounter: 60 minutes. This includes face to face time and non-face to face time preparing to see the patient (eg, review of tests), obtaining and/or reviewing separately obtained history, documenting clinical information in the electronic or other health record, independently interpreting results and communicating results to the patient/family/caregiver, or care coordinator.     Jose Romeo MD  Neurology

## 2024-04-21 ENCOUNTER — HOSPITAL ENCOUNTER (OUTPATIENT)
Dept: RADIOLOGY | Facility: HOSPITAL | Age: 29
Discharge: HOME | End: 2024-04-21
Attending: INTERNAL MEDICINE
Payer: MEDICAID

## 2024-04-21 DIAGNOSIS — R56.9 SEIZURES: ICD-10-CM

## 2024-04-21 PROCEDURE — 70551 MRI BRAIN STEM W/O DYE: CPT | Mod: 26,,, | Performed by: RADIOLOGY

## 2024-04-21 PROCEDURE — 70551 MRI BRAIN STEM W/O DYE: CPT | Mod: TC

## 2024-05-05 ENCOUNTER — PATIENT MESSAGE (OUTPATIENT)
Dept: NEUROLOGY | Facility: CLINIC | Age: 29
End: 2024-05-05
Payer: MEDICAID

## 2024-05-14 ENCOUNTER — OFFICE VISIT (OUTPATIENT)
Dept: NEUROSURGERY | Facility: CLINIC | Age: 29
End: 2024-05-14
Attending: STUDENT IN AN ORGANIZED HEALTH CARE EDUCATION/TRAINING PROGRAM
Payer: MEDICAID

## 2024-05-14 VITALS
BODY MASS INDEX: 29.44 KG/M2 | OXYGEN SATURATION: 100 % | HEART RATE: 86 BPM | HEIGHT: 60 IN | SYSTOLIC BLOOD PRESSURE: 117 MMHG | DIASTOLIC BLOOD PRESSURE: 82 MMHG | WEIGHT: 149.94 LBS

## 2024-05-14 DIAGNOSIS — Z98.2 INTRACRANIAL SHUNT: ICD-10-CM

## 2024-05-14 DIAGNOSIS — I63.89 OTHER CEREBRAL INFARCTION: Primary | ICD-10-CM

## 2024-05-14 DIAGNOSIS — R56.9 SEIZURES: ICD-10-CM

## 2024-05-14 DIAGNOSIS — G91.9 HYDROCEPHALUS, UNSPECIFIED TYPE: ICD-10-CM

## 2024-05-14 PROCEDURE — 3074F SYST BP LT 130 MM HG: CPT | Mod: CPTII,S$GLB,, | Performed by: STUDENT IN AN ORGANIZED HEALTH CARE EDUCATION/TRAINING PROGRAM

## 2024-05-14 PROCEDURE — 99204 OFFICE O/P NEW MOD 45 MIN: CPT | Mod: S$GLB,,, | Performed by: STUDENT IN AN ORGANIZED HEALTH CARE EDUCATION/TRAINING PROGRAM

## 2024-05-14 PROCEDURE — 3008F BODY MASS INDEX DOCD: CPT | Mod: CPTII,S$GLB,, | Performed by: STUDENT IN AN ORGANIZED HEALTH CARE EDUCATION/TRAINING PROGRAM

## 2024-05-14 PROCEDURE — 1159F MED LIST DOCD IN RCRD: CPT | Mod: CPTII,S$GLB,, | Performed by: STUDENT IN AN ORGANIZED HEALTH CARE EDUCATION/TRAINING PROGRAM

## 2024-05-14 PROCEDURE — 3079F DIAST BP 80-89 MM HG: CPT | Mod: CPTII,S$GLB,, | Performed by: STUDENT IN AN ORGANIZED HEALTH CARE EDUCATION/TRAINING PROGRAM

## 2024-05-14 NOTE — PROGRESS NOTES
[facility]  Neurosurgery    SUBJECTIVE:     History of Present Illness:  Santino Pompa is a 29 y.o. female past medical history significant for hydrocephalus related to prematurity and intraventricular hemorrhage who presents to re-establish care.    Has a very complicated shunt system.  She initially had the shunts placed related to intraventricular hemorrhage.  She had a series of shunt malfunctions throughout childhood.  The symptoms of the shunt malfunctions were drowsiness and vomiting.  She ultimately has a system which involves 2 left-sided catheters as well as 1 right-sided catheter, as well as a left-sided posterior fossa catheter.  All of these catheters are disconnected at this point and do not drain anywhere past the cranial vault.    Patient's last shunt revision was in 2004 per the parents    They are not concerned with her today and feel like she is doing well.      (Not in a hospital admission)      Review of patient's allergies indicates:   Allergen Reactions    Latex, natural rubber Swelling    Demerol [meperidine]        Past Medical History:   Diagnosis Date    Anticoagulant long-term use     Blood transfusion     Hydrocephalus     Has 2 shunts    PDA (patent ductus arteriosus) At birth    repaired    Seizures      Past Surgical History:   Procedure Laterality Date    BRAIN SURGERY      CARDIAC SURGERY      EYE SURGERY       Family History   Problem Relation Name Age of Onset    Hypertension Maternal Grandmother      COPD Maternal Grandfather      Hypertension Maternal Grandfather      Hypertension Paternal Grandmother      Kidney disease Paternal Grandfather      Hypertension Father       Social History     Tobacco Use    Smoking status: Never    Smokeless tobacco: Never   Substance Use Topics    Alcohol use: Never    Drug use: Never        Review of Systems:  As noted in HPI    OBJECTIVE:     Vital Signs (Most Recent):  Pulse: 86 (05/14/24 1018)  BP: 117/82 (05/14/24  1018)  SpO2: 100 % (05/14/24 1018)    Physical Exam:  General:  Pleasant. dysmorphic appearance related to cerebral palsy.  Head:  Dysmorphic.  Multiple surgical scars on both sides of the head.  Neurologic:  Laughs.  Somewhat states name although this is hard to understand.  Interacts with the examiner some.  Cranial nerves:  Pupils are equal  Pulmonary: normal respirations  Motor Strength:  Able to lift up the arms and the legs, left side seems stronger than the right.  Follows simple commands    Gait:  In a wheelchair, dependent on parents to get around     Diagnostic Results:  I have personally reviewed imaging. My impression is as follows.    MRI brain without contrast from 04/21/2024 shows a dysmorphic ventricular system.  This study is quite limited by motion artifact.  There is good definition of the sulci.  There is no transependymal flow.  Multiple shunt catheters are coursing through on both sides.  Compared to prior imaging in our system, this appears roughly stable to prior scans.    I reviewed multiple prior CTs, going back to 2013.  This shows that none of the catheters are actually connected to a distal catheter.  Thus, the system is all disconnected.        ASSESSMENT/PLAN:     Santino Pompa is a 29 y.o. female with hydrocephalus related to intraventricular hemorrhage of prematurity who presents to \[facility]\"" care, likely no longer shunt dependent  -no neurosurgical intervention is indicated  -patient is following with Dr. Romeo of Neurology for management of her seizure medicines  -counseled the patient's family on signs and symptoms of shunt malfunction and what to look out for.  -suspect that patient is not shunt dependent.  A less likely explanation would be that she has some kind of scar tissue tract which is diverting her CSF  -return to clinic in 2 years with repeat MRI without contrast to assess the ventricular system.    Please feel free to call with any further questions.    Time  spent on this encounter: 45 minutes. This includes face-to-face time and non-face to face time preparing to see the patient (eg, review of tests), obtaining and/or reviewing separately obtained history, documenting clinical information in the electronic or other health record, independently interpreting results and communicating results to the patient/family/caregiver, or care coordinator.      Felix BOURNE [place]  [facility]  Department of Neurosurgery  [phone]    Disclaimer: This note was dictated by speech recognition. Minor errors in transcription may be present.  Please call with any questions.

## 2024-05-31 ENCOUNTER — TELEPHONE (OUTPATIENT)
Dept: NEUROLOGY | Facility: CLINIC | Age: 29
End: 2024-05-31

## 2024-06-03 ENCOUNTER — TELEPHONE (OUTPATIENT)
Dept: NEUROLOGY | Facility: CLINIC | Age: 29
End: 2024-06-03
Payer: MEDICAID

## 2024-06-03 NOTE — TELEPHONE ENCOUNTER
I spoke w/sandra dietrich(mother) and she is aware that dr sheehan is not a [place] medicaid provider and pt will need to establish a neurologist in [place], [place]    Pt is  in need of refills on xcopri 100 mg  and mother has been informed to call dr eric malave (pcp)   to ask for a refill on the medication      Thank you

## 2024-06-03 NOTE — TELEPHONE ENCOUNTER
Francisca pt mom called  to see if Dr Lara will fill this drug for Dr Romeo because she not in network of [place] [employer]. Needing another neurologist

## 2024-06-03 NOTE — TELEPHONE ENCOUNTER
----- Message from Arely Santoyo sent at 6/3/2024  1:58 PM CDT -----  Type:  Needs Medical Advice    Who Called:  Pt's mom Francisca    cenobamate (XCOPRI) 100 mg Tab    Pharmacy name and phone #:     [id number] - [place], [place] - [address] [facility]  [address]  [address]  [place] [address]  Phone: [phone] Fax: [phone]    Would the patient rather a call back or a response via [facility]?  Call back    Best Call Back Number:  [phone]    Additional Information:  Francisca is calling to see if Dr Lara will fill this drug for Dr Romeo because she not in network of [place] [employer]. Needing another neurologist.  Please call back to advise. Thanks!

## 2024-06-04 ENCOUNTER — PATIENT MESSAGE (OUTPATIENT)
Dept: PEDIATRICS | Facility: CLINIC | Age: 29
End: 2024-06-04
Payer: MEDICAID

## 2024-06-04 RX ORDER — CENOBAMATE 100 MG/1
100 TABLET, FILM COATED ORAL NIGHTLY
Qty: 60 TABLET | Refills: 3 | OUTPATIENT
Start: 2024-06-04

## 2024-06-06 ENCOUNTER — TELEPHONE (OUTPATIENT)
Dept: FAMILY MEDICINE | Facility: CLINIC | Age: 29
End: 2024-06-06
Payer: MEDICAID

## 2024-06-06 DIAGNOSIS — G91.9 HYDROCEPHALUS, UNSPECIFIED TYPE: Primary | ICD-10-CM

## 2024-06-06 DIAGNOSIS — G40.909 NONINTRACTABLE EPILEPSY WITHOUT STATUS EPILEPTICUS, UNSPECIFIED EPILEPSY TYPE: ICD-10-CM

## 2024-06-06 NOTE — TELEPHONE ENCOUNTER
----- Message from Corbin Shipman sent at 6/6/2024  3:20 PM CDT -----  Contact: Mom  Type: Needs Medical Advice  Who Called:  Mom    Best Call Back Number: [phone]   Additional Information: One would like a call from nurse to discuss cenobamate (XCOPRI) 100 mg Tab

## 2024-06-06 NOTE — TELEPHONE ENCOUNTER
Spoke with pt mom, Francisca.   Francisca reports Neurology provider did fill XCOPRI, yet pharmacy unable as neurologist is not a medicaid crouch  Neurologist advised to contact pcp to fill the medication until pt can get in with a new neurologist.   Please advise

## 2024-06-07 ENCOUNTER — TELEPHONE (OUTPATIENT)
Dept: ADMINISTRATIVE | Facility: OTHER | Age: 29
End: 2024-06-07
Payer: MEDICAID

## 2024-06-07 RX ORDER — CENOBAMATE 100 MG/1
100 TABLET, FILM COATED ORAL NIGHTLY
Qty: 30 TABLET | Refills: 0 | Status: SHIPPED | OUTPATIENT
Start: 2024-06-07

## 2024-06-07 NOTE — TELEPHONE ENCOUNTER
Pt mom notified RX sent into pharmacy.   Advised mom to contact insurance to see what neurologist in network. Mom will contact office back with information

## 2024-06-07 NOTE — TELEPHONE ENCOUNTER
I sent one month as this is a controlled medication.  I have also placed a referral for neurology. Please fax- possibly to [facility].  Dr. Lara

## 2024-06-12 ENCOUNTER — TELEPHONE (OUTPATIENT)
Dept: NEUROLOGY | Facility: CLINIC | Age: 29
End: 2024-06-12
Payer: MEDICAID

## 2024-06-12 NOTE — TELEPHONE ENCOUNTER
"I spoke with the patient's mother, she wanted to know if Dr. Mckeon "Neurologist" is licensed in [place]. I informed her that I will get a message to the Neuro. department at [facility] and let her know once I get a response. The pt. Is a patient of Dr. Jose Romeo but the provider is not a licensed medicaid provider in [place]. I advised the pt.'s mother that if Dr. Mckeon is not licensed in [place], the patient should see a provider in [place] whom does [place]/Medicaid. The patient's mother states that the patient has a neurologist  that she had previously seen in [place].    "

## 2024-06-19 ENCOUNTER — TELEPHONE (OUTPATIENT)
Dept: NEUROLOGY | Facility: CLINIC | Age: 29
End: 2024-06-19
Payer: MEDICAID

## 2024-06-20 ENCOUNTER — TELEPHONE (OUTPATIENT)
Dept: NEUROSURGERY | Facility: CLINIC | Age: 29
End: 2024-06-20
Payer: MEDICAID

## 2024-06-20 NOTE — TELEPHONE ENCOUNTER
----- Message from Dolly Tirado RN sent at 6/19/2024  4:31 PM CDT -----  Regarding: RE: self  Didn't even read michelle Mckeon and [place]!!  ----- Message -----  From: Chyna Renteria MA  Sent: 6/19/2024   3:14 PM CDT  To: Ai Ruffin MA; Dolly Tirado RN; #  Subject: RE: self                                         Dr. Mckeon is not licensed in [place] and also does not typically manage patients seizure medication, especially long term. Looks like she saw Dr. Albrecht in Neurosurgery on the [facility] and is now established for hydrocephalus. Since she will be following up with him, maybe he would be able to prescribe if it is not covered by the neurologist seeing her?  ----- Message -----  From: Dolly Tirado RN  Sent: 6/19/2024   2:15 PM CDT  To: Ai Ruffin MA; Екатерина Hernandez MA; #  Subject: RE: self                                         I have no idea but have included NSGY team as well.  ----- Message -----  From: Екатерина Hernandez MA  Sent: 6/12/2024   7:29 AM CDT  To: Dolly Tirado RN  Subject: FW: self                                         Good morning Dolly , do you know if Dr. Mckeon is licensed in the state of [place]?  ----- Message -----  From: Jose Romeo MD  Sent: 6/10/2024   1:00 PM CDT  To: Екатерина Hernandez MA  Subject: RE: self                                         I did not understand the question.    Jose  ----- Message -----  From: Екатерина Hernandez MA  Sent: 6/7/2024   2:56 PM CDT  To: Jose Romeo MD  Subject: FW: self                                           ----- Message -----  From: Petrona Orlando MA  Sent: 6/6/2024   4:09 PM CDT  To: [facility] Clinical Support  Subject: FW: self                                           ----- Message -----  From: Ryan Gomez  Sent: 6/6/2024   3:37 PM CDT  To: [facility] Staff  Subject: self                                             Type: Patient Call Back    Who called:self    What is the request in detail:pt is trying to  switch the medication she is taking for this provider over to cynthia matos because he takes their insurance   cenobamate (XCOPRI) 100 mg Tab    Can the clinic reply by [facility]?no    Would the patient rather a call back or a response via [facility]? callback    Best call back number:[phone]    Additional Information:

## 2024-07-25 ENCOUNTER — OFFICE VISIT (OUTPATIENT)
Dept: OPTOMETRY | Facility: CLINIC | Age: 29
End: 2024-07-25
Payer: MEDICAID

## 2024-07-25 DIAGNOSIS — H55.00 NYSTAGMUS: Primary | ICD-10-CM

## 2024-07-25 DIAGNOSIS — H50.21 HYPERTROPIA OF RIGHT EYE: ICD-10-CM

## 2024-07-25 PROCEDURE — 1159F MED LIST DOCD IN RCRD: CPT | Mod: CPTII,,, | Performed by: OPTOMETRIST

## 2024-07-25 PROCEDURE — 99999 PR PBB SHADOW E&M-EST. PATIENT-LVL III: CPT | Mod: PBBFAC,,, | Performed by: OPTOMETRIST

## 2024-07-25 PROCEDURE — 92004 COMPRE OPH EXAM NEW PT 1/>: CPT | Mod: S$PBB,,, | Performed by: OPTOMETRIST

## 2024-07-25 PROCEDURE — 1160F RVW MEDS BY RX/DR IN RCRD: CPT | Mod: CPTII,,, | Performed by: OPTOMETRIST

## 2024-07-25 PROCEDURE — 99213 OFFICE O/P EST LOW 20 MIN: CPT | Mod: PBBFAC | Performed by: OPTOMETRIST

## 2024-07-25 NOTE — PROGRESS NOTES
"HPI    Chief complaint (CC): 28 yo F here for annual eye exam. Pt presents with   her mother who states that she has noticed the left eye appears to be   protruding more and pointed downward. She reports that pt had strabismus   surgery in the past and is concerned whether pt may need a second surgery.   Pt and mother deny any other ocular or visual complaints today.   Glasses? No  Contacts? No   H/o eye surgery, injections or laser: Strabismus surgery   H/o eye injury: No   Known eye conditions? No   Family h/o eye conditions? No   Eye gtts? No      (-) Flashes (-)  Floaters (-) Mucous   (-)  Tearing (-) Itching (-) Burning   (-) Headaches (-) Eye Pain/discomfort (-) Irritation   (-)  Redness (-) Double vision (-) Blurry vision    Diabetic? No   A1c? No results found for: "LABA1C", "HGBA1C"  Last edited by Katia Rice, OD on 7/25/2024  1:48 PM.            Assessment /Plan     For exam results, see Encounter Report.      Nystagmus  Hypertropia of right eye   - Pt with h/o Hydrocephalus, Spastic hemiplegic cerebral palsy, Right spastic hemiplegia, Intracranial shunt, Intellectual disability, Epilepsy, and Seizures   - (+) RHT, right beating horizontal nystagmus   - Ocular health overall WNL with DFE OU (-) disc edema OU    - Pt's mother concerned whether or not pt will need additional strabismus surgery.    - Pt referred to Dr. Mead for further evaluation                     "

## 2024-09-10 ENCOUNTER — TELEPHONE (OUTPATIENT)
Dept: FAMILY MEDICINE | Facility: CLINIC | Age: 29
End: 2024-09-10
Payer: MEDICAID

## 2024-09-23 ENCOUNTER — OFFICE VISIT (OUTPATIENT)
Dept: OPHTHALMOLOGY | Facility: CLINIC | Age: 29
End: 2024-09-23
Payer: MEDICAID

## 2024-09-23 DIAGNOSIS — H50.012 ESOTROPIA OF LEFT EYE: ICD-10-CM

## 2024-09-23 DIAGNOSIS — H52.13 MYOPIA OF BOTH EYES: ICD-10-CM

## 2024-09-23 DIAGNOSIS — H51.0 PALSY OF CONJUGATE GAZE: Primary | ICD-10-CM

## 2024-09-23 DIAGNOSIS — H51.23 INTERNUCLEAR OPHTHALMOPLEGIA OF BOTH EYES: ICD-10-CM

## 2024-09-23 DIAGNOSIS — G91.9 HYDROCEPHALUS, UNSPECIFIED TYPE: ICD-10-CM

## 2024-09-23 PROCEDURE — 92015 DETERMINE REFRACTIVE STATE: CPT | Mod: ,,, | Performed by: STUDENT IN AN ORGANIZED HEALTH CARE EDUCATION/TRAINING PROGRAM

## 2024-09-23 PROCEDURE — 99204 OFFICE O/P NEW MOD 45 MIN: CPT | Mod: S$PBB,,, | Performed by: STUDENT IN AN ORGANIZED HEALTH CARE EDUCATION/TRAINING PROGRAM

## 2024-09-23 PROCEDURE — 99999 PR PBB SHADOW E&M-EST. PATIENT-LVL III: CPT | Mod: PBBFAC,,, | Performed by: STUDENT IN AN ORGANIZED HEALTH CARE EDUCATION/TRAINING PROGRAM

## 2024-09-23 PROCEDURE — 92060 SENSORIMOTOR EXAMINATION: CPT | Mod: 26,S$PBB,, | Performed by: STUDENT IN AN ORGANIZED HEALTH CARE EDUCATION/TRAINING PROGRAM

## 2024-09-23 PROCEDURE — 1159F MED LIST DOCD IN RCRD: CPT | Mod: CPTII,,, | Performed by: STUDENT IN AN ORGANIZED HEALTH CARE EDUCATION/TRAINING PROGRAM

## 2024-09-23 PROCEDURE — 99213 OFFICE O/P EST LOW 20 MIN: CPT | Mod: PBBFAC,25 | Performed by: STUDENT IN AN ORGANIZED HEALTH CARE EDUCATION/TRAINING PROGRAM

## 2024-09-23 PROCEDURE — 1160F RVW MEDS BY RX/DR IN RCRD: CPT | Mod: CPTII,,, | Performed by: STUDENT IN AN ORGANIZED HEALTH CARE EDUCATION/TRAINING PROGRAM

## 2024-09-23 PROCEDURE — 92060 SENSORIMOTOR EXAMINATION: CPT | Mod: PBBFAC | Performed by: STUDENT IN AN ORGANIZED HEALTH CARE EDUCATION/TRAINING PROGRAM

## 2024-09-23 NOTE — ASSESSMENT & PLAN NOTE
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  No acute intracranial process with a stable appearance of the brain and ventricles when compared to prior study.  The hippocampal formations are difficult to visualize with prominent dilatation of the temporal horns bilaterally.    Per Neurosurgery note 5/14/24:  "MRI brain without contrast from 04/21/2024 shows a dysmorphic ventricular system.  This study is quite limited by motion artifact.  There is good definition of the sulci.  There is no transependymal flow.  Multiple shunt catheters are coursing through on both sides.  Compared to prior imaging in our system, this appears roughly stable to prior scans.  I reviewed multiple prior CTs, going back to 2013.  This shows that none of the catheters are actually connected to a distal catheter.  Thus, the system is all disconnected."    Per Last Neurology note 7/11/24  "Seizures are under good control. Continue levetiracetam solution 15 mLs BID. Continue Xcopri 100 mg nightly."    9/23/24: Here for concern for worsening strabismus. Mom's main concern is the left eye looking more down, but she says both eyes have looked increasing downward over the last year.   My exam is extremely limited today given patient cooperation, however, I do see she is not able to elevate either eye above baseline. I also appreciate a left eye abduction deficit concerning for a sixth nerve palsy on the left  On dilated exam, I do not see any disc edema    Plan:  Exam extremely limited, but given the tonic downgaze and upgaze deficit as well as abduction deficit of the left eye, I am concerned for gradually increased intracranial pressure  Will message Neurology and Neurosurgery about guidance " of timing for repeat neuroimaging  Discussed that any strabismus repair would be reconstructive and would not necessarily improve visual functioning - recommend holding for now

## 2024-09-23 NOTE — PROGRESS NOTES
"HPI    Patient is here today for a Strabismus evaluation with her mother, Francisca.   Last eye exam was on 07/25/2024 with Dr. Rice. Mother has noticed her   left eye has been protruding out more and pointing downwards. Patient had   Strabismus surgery in [place].   Santino has a history of hydrocephalus s/p  shunt. She follows with   Neurosurgery and Neurology.  Her last MRI Brain on 4/21/24 showed: "    In terms of her ocular history, she reportedly had eye muscle surgery as a   young child for reported vertical strabismus. Mom feels the eyes were   straight for several years. Over the last year, Mom reports both eyes have   been increasingly pointing downward, left worse than right.   She was seen with Dr. Rice on 7/25/24 and noted to have a right   hypertropia. On that exam, her optic discs were reportedly normal with no   edema.    Last edited by Girish Mead MD on 9/23/2024 11:39 AM.        ROS    Negative for: Constitutional, Gastrointestinal, Neurological, Skin,   Genitourinary, Musculoskeletal, HENT, Endocrine, Cardiovascular, Eyes,   Respiratory, Psychiatric, Allergic/Imm, Heme/Lymph  Last edited by Girish Mead MD on 9/23/2024 11:39 AM.        Assessment /Plan     For exam results, see Encounter Report.    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  No acute intracranial process with a stable appearance of the brain and ventricles when compared to prior study.  The hippocampal " "formations are difficult to visualize with prominent dilatation of the temporal horns bilaterally.    Per Neurosurgery note 5/14/24:  "MRI brain without contrast from 04/21/2024 shows a dysmorphic ventricular system.  This study is quite limited by motion artifact.  There is good definition of the sulci.  There is no transependymal flow.  Multiple shunt catheters are coursing through on both sides.  Compared to prior imaging in our system, this appears roughly stable to prior scans.  I reviewed multiple prior CTs, going back to 2013.  This shows that none of the catheters are actually connected to a distal catheter.  Thus, the system is all disconnected."    Per Last Neurology note 7/11/24  "Seizures are under good control. Continue levetiracetam solution 15 mLs BID. Continue Xcopri 100 mg nightly."    9/23/24: Here for concern for worsening strabismus. Mom's main concern is the left eye looking more down, but she says both eyes have looked increasing downward over the last year.   My exam is extremely limited today given patient cooperation, however, I do see she is not able to elevate either eye above baseline. I also appreciate a left eye abduction deficit concerning for a sixth nerve palsy on the left  On dilated exam, I do not see any disc edema    Plan:  Exam extremely limited, but given the tonic downgaze and upgaze deficit as well as abduction deficit of the left eye, I am concerned for gradually increased intracranial pressure  Will message Neurology and Neurosurgery about guidance of timing for repeat neuroimaging  Discussed that any strabismus repair would be reconstructive and would not necessarily improve visual functioning - recommend holding for now           Esotropia of left eye    Myopia of both eyes    Current Assessment & Plan     Will give glasses to see if helps with visual functioning           Girish Mead MD  Pediatric Ophthalmology and Adult Strabismus  [facility]      Time " spent on this encounter: 45 minutes. This includes face to face time and non-face to face time preparing to see the patient (eg, review of tests, records, etc.), obtaining and/or reviewing separately obtained history, documenting clinical information in the electronic or other health record, examining patient, independently interpreting results and communicating results to the patient/family/caregiver, or care coordinator.

## 2024-09-27 ENCOUNTER — TELEPHONE (OUTPATIENT)
Dept: FAMILY MEDICINE | Facility: CLINIC | Age: 29
End: 2024-09-27
Payer: MEDICAID

## 2024-09-27 ENCOUNTER — PATIENT MESSAGE (OUTPATIENT)
Dept: FAMILY MEDICINE | Facility: CLINIC | Age: 29
End: 2024-09-27
Payer: MEDICAID

## 2024-09-27 NOTE — TELEPHONE ENCOUNTER
----- Message from Chelsea Amato sent at 9/27/2024  3:37 PM CDT -----  Regarding: portal for one last form  Contact: pt  Type:  Needs Medical Advice    Who Called: pt    Best Call Back Number: [phone]    Additional Information: pt was told by staff to put ihnr phys recommendation form up on the portal.  Pt unclear on how to do that..  Pt needs this last form to be signed.

## 2024-10-01 ENCOUNTER — TELEPHONE (OUTPATIENT)
Dept: FAMILY MEDICINE | Facility: CLINIC | Age: 29
End: 2024-10-01
Payer: MEDICAID

## 2024-10-01 NOTE — TELEPHONE ENCOUNTER
"Spoke with Francisca.   Paperwork submitted 9/27/24 for Dr. Lara to complete via portal. Informed mother MD out of office until 10/8/24  Mother states, "her services will be canceled by then. Can someone else print and complete it?" Please advise  "

## 2024-10-01 NOTE — TELEPHONE ENCOUNTER
----- Message from Lizette sent at 10/1/2024  4:21 PM CDT -----  Regarding: advice  Contact: Francisca mother  Type: Needs Medical Advice  Who Called:  Francisca mother  Symptoms (please be specific):    How long has patient had these symptoms:    Pharmacy name and phone #:    Best Call Back Number: [phone]  Additional Information: Please call mother concerning paper work sent in.  Thanks!

## 2024-10-02 ENCOUNTER — TELEPHONE (OUTPATIENT)
Dept: FAMILY MEDICINE | Facility: CLINIC | Age: 29
End: 2024-10-02
Payer: MEDICAID

## 2024-10-02 NOTE — TELEPHONE ENCOUNTER
Spoke with patients mother, informed her that the paperwork ( physicians recommendation form ) was ready for  at the .

## 2024-10-10 DIAGNOSIS — L30.9 DERMATITIS: ICD-10-CM

## 2024-10-10 RX ORDER — MOMETASONE FUROATE 1 MG/ML
SOLUTION TOPICAL
Qty: 60 ML | Refills: 5 | Status: SHIPPED | OUTPATIENT
Start: 2024-10-10

## 2024-10-10 NOTE — TELEPHONE ENCOUNTER
Refill Routing Note   Medication(s) are not appropriate for processing by [facility] for the following reason(s):        No active prescription written by provider    [facility] action(s):  Defer               Appointments  past 12m or future 3m with PCP    Date Provider   Last Visit   3/6/2024 Alysha Lara MD   Next Visit   11/1/2024 Alysha Lara MD   ED visits in past 90 days: 0        Note composed:1:14 PM 10/10/2024

## 2024-10-10 NOTE — TELEPHONE ENCOUNTER
No care due was identified.  [facility] Embedded Care Due Messages. Reference number: [id number].   10/10/2024 1:13:58 PM CDT

## 2024-10-30 ENCOUNTER — HOSPITAL ENCOUNTER (OUTPATIENT)
Dept: RADIOLOGY | Facility: HOSPITAL | Age: 29
Discharge: HOME | End: 2024-10-30
Attending: STUDENT IN AN ORGANIZED HEALTH CARE EDUCATION/TRAINING PROGRAM
Payer: MEDICAID

## 2024-10-30 DIAGNOSIS — H51.23 INTERNUCLEAR OPHTHALMOPLEGIA OF BOTH EYES: ICD-10-CM

## 2024-10-30 DIAGNOSIS — H50.012 ESOTROPIA OF LEFT EYE: ICD-10-CM

## 2024-10-30 DIAGNOSIS — H51.0 PALSY OF CONJUGATE GAZE: ICD-10-CM

## 2024-10-30 PROCEDURE — 70540 MRI ORBIT/FACE/NECK W/O DYE: CPT | Mod: TC

## 2024-10-30 PROCEDURE — 70551 MRI BRAIN STEM W/O DYE: CPT | Mod: TC

## 2024-10-30 PROCEDURE — 70540 MRI ORBIT/FACE/NECK W/O DYE: CPT | Mod: 26,,, | Performed by: RADIOLOGY

## 2024-10-30 PROCEDURE — 70551 MRI BRAIN STEM W/O DYE: CPT | Mod: 26,,, | Performed by: RADIOLOGY

## 2024-10-31 ENCOUNTER — TELEPHONE (OUTPATIENT)
Dept: OPHTHALMOLOGY | Facility: CLINIC | Age: 29
End: 2024-10-31
Payer: MEDICAID

## 2024-10-31 RX ORDER — HYDROCORTISONE 25 MG/G
CREAM TOPICAL 2 TIMES DAILY
Qty: 28 G | Refills: 11 | Status: SHIPPED | OUTPATIENT
Start: 2024-10-31

## 2024-11-01 ENCOUNTER — OFFICE VISIT (OUTPATIENT)
Dept: FAMILY MEDICINE | Facility: CLINIC | Age: 29
End: 2024-11-01
Payer: MEDICAID

## 2024-11-01 VITALS
WEIGHT: 155.31 LBS | HEIGHT: 60 IN | BODY MASS INDEX: 30.49 KG/M2 | RESPIRATION RATE: 16 BRPM | OXYGEN SATURATION: 95 % | SYSTOLIC BLOOD PRESSURE: 98 MMHG | DIASTOLIC BLOOD PRESSURE: 74 MMHG | HEART RATE: 94 BPM

## 2024-11-01 DIAGNOSIS — Z79.899 LONG-TERM USE OF HIGH-RISK MEDICATION: Primary | ICD-10-CM

## 2024-11-01 DIAGNOSIS — Z13.6 ENCOUNTER FOR LIPID SCREENING FOR CARDIOVASCULAR DISEASE: ICD-10-CM

## 2024-11-01 DIAGNOSIS — G81.11 RIGHT SPASTIC HEMIPLEGIA: ICD-10-CM

## 2024-11-01 DIAGNOSIS — G40.909 NONINTRACTABLE EPILEPSY WITHOUT STATUS EPILEPTICUS, UNSPECIFIED EPILEPSY TYPE: ICD-10-CM

## 2024-11-01 DIAGNOSIS — N39.42 INCONTINENCE WITHOUT SENSORY AWARENESS: ICD-10-CM

## 2024-11-01 DIAGNOSIS — N39.45 CONTINUOUS LEAKAGE OF URINE: ICD-10-CM

## 2024-11-01 DIAGNOSIS — G80.2 SPASTIC HEMIPLEGIC CEREBRAL PALSY: ICD-10-CM

## 2024-11-01 DIAGNOSIS — Z99.3 WHEELCHAIR DEPENDENCE: ICD-10-CM

## 2024-11-01 DIAGNOSIS — Z13.220 ENCOUNTER FOR LIPID SCREENING FOR CARDIOVASCULAR DISEASE: ICD-10-CM

## 2024-11-01 DIAGNOSIS — L30.9 DERMATITIS: ICD-10-CM

## 2024-11-01 DIAGNOSIS — F79 INTELLECTUAL DISABILITY: ICD-10-CM

## 2024-11-01 DIAGNOSIS — R26.89 DECREASED FUNCTIONAL MOBILITY: ICD-10-CM

## 2024-11-01 DIAGNOSIS — M62.838 MUSCLE SPASTICITY: ICD-10-CM

## 2024-11-01 PROBLEM — R56.9 SEIZURES: Status: RESOLVED | Noted: 2022-05-18 | Resolved: 2024-11-01

## 2024-11-01 PROCEDURE — 99214 OFFICE O/P EST MOD 30 MIN: CPT | Mod: PBBFAC,PN | Performed by: STUDENT IN AN ORGANIZED HEALTH CARE EDUCATION/TRAINING PROGRAM

## 2024-11-01 PROCEDURE — 99999 PR PBB SHADOW E&M-EST. PATIENT-LVL IV: CPT | Mod: PBBFAC,,, | Performed by: STUDENT IN AN ORGANIZED HEALTH CARE EDUCATION/TRAINING PROGRAM

## 2024-11-01 RX ORDER — TRIAMCINOLONE ACETONIDE 5 MG/G
CREAM TOPICAL
Qty: 15 G | Refills: 3 | Status: SHIPPED | OUTPATIENT
Start: 2024-11-01

## 2024-11-01 RX ORDER — NYSTATIN 100000 U/G
CREAM TOPICAL 2 TIMES DAILY
Qty: 30 G | Refills: 5 | Status: SHIPPED | OUTPATIENT
Start: 2024-11-01

## 2025-02-03 ENCOUNTER — OFFICE VISIT (OUTPATIENT)
Dept: OPHTHALMOLOGY | Facility: CLINIC | Age: 30
End: 2025-02-03
Payer: MEDICAID

## 2025-02-03 DIAGNOSIS — H50.012 ESOTROPIA OF LEFT EYE: ICD-10-CM

## 2025-02-03 DIAGNOSIS — H52.13 MYOPIA OF BOTH EYES: ICD-10-CM

## 2025-02-03 DIAGNOSIS — H01.001 BLEPHARITIS OF RIGHT UPPER EYELID, UNSPECIFIED TYPE: ICD-10-CM

## 2025-02-03 DIAGNOSIS — H51.0 PALSY OF CONJUGATE GAZE: Primary | ICD-10-CM

## 2025-02-03 PROCEDURE — 1160F RVW MEDS BY RX/DR IN RCRD: CPT | Mod: CPTII,,, | Performed by: STUDENT IN AN ORGANIZED HEALTH CARE EDUCATION/TRAINING PROGRAM

## 2025-02-03 PROCEDURE — 1159F MED LIST DOCD IN RCRD: CPT | Mod: CPTII,,, | Performed by: STUDENT IN AN ORGANIZED HEALTH CARE EDUCATION/TRAINING PROGRAM

## 2025-02-03 PROCEDURE — 99213 OFFICE O/P EST LOW 20 MIN: CPT | Mod: PBBFAC | Performed by: STUDENT IN AN ORGANIZED HEALTH CARE EDUCATION/TRAINING PROGRAM

## 2025-02-03 PROCEDURE — 99999 PR PBB SHADOW E&M-EST. PATIENT-LVL III: CPT | Mod: PBBFAC,,, | Performed by: STUDENT IN AN ORGANIZED HEALTH CARE EDUCATION/TRAINING PROGRAM

## 2025-02-03 PROCEDURE — 99213 OFFICE O/P EST LOW 20 MIN: CPT | Mod: S$PBB,,, | Performed by: STUDENT IN AN ORGANIZED HEALTH CARE EDUCATION/TRAINING PROGRAM

## 2025-02-03 RX ORDER — NEOMYCIN SULFATE, POLYMYXIN B SULFATE AND DEXAMETHASONE 3.5; 10000; 1 MG/ML; [USP'U]/ML; MG/ML
1 SUSPENSION/ DROPS OPHTHALMIC 2 TIMES DAILY
Qty: 5 ML | Refills: 0 | Status: SHIPPED | OUTPATIENT
Start: 2025-02-03

## 2025-02-03 NOTE — ASSESSMENT & PLAN NOTE
Mom noting a lot of discharge from OD for one month  Has anterior and post blepharitis OD only    Plan:  Advised on twice daily lid scrubs with baby shampoo  Will do short course of maxitrol - BID for two weeks then stop

## 2025-02-03 NOTE — PROGRESS NOTES
"HPI    Santino Pompa is a 30 y.o. female who is brought in by her mother for   continued eye care. Mom reports that she noticed crusting of Santino's right   eye that started one month ago. Mom tried warm compresses, no relief. Mom   denies eye swelling and redness.   Mom states that she still notice Santino's right eye has been protruding out   more and her left eye is pointing downwards.  Mom states that Santino has been wearing her eyeglasses full time before   breaking them last week.     No Current Eye Meds.    Last edited by Jenni Castro MA on 2/3/2025  1:11 PM.        ROS    Negative for: Constitutional, Gastrointestinal, Neurological, Skin,   Genitourinary, Musculoskeletal, HENT, Endocrine, Cardiovascular, Eyes,   Respiratory, Psychiatric, Allergic/Imm, Heme/Lymph  Last edited by Girish Mead MD on 2/3/2025  4:07 PM.        Assessment /Plan     For exam results, see Encounter Report.    Palsy of conjugate gaze    Blepharitis of right upper eyelid, unspecified type    Myopia of both eyes    Esotropia of left eye    Other orders  -     neomycin-polymyxin-dexamethasone (MAXITROL) 3.5mg/mL-10,000 unit/mL-0.1 % DrpS; Place 1 drop into the right eye 2 (two) times a day.  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  This study is quite limited by motion artifact.  There is good definition of the sulci.  There is no transependymal flow.  Multiple shunt catheters are coursing through on both sides.  Compared to prior imaging in our system, this " "appears roughly stable to prior scans.  I reviewed multiple prior CTs, going back to 2013.  This shows that none of the catheters are actually connected to a distal catheter.  Thus, the system is all disconnected."    9/23/24: Here for concern for worsening strabismus. Mom's main concern is the left eye looking more down, but she says both eyes have looked increasing downward over the last year.   My exam is extremely limited today given patient cooperation, however, I do see she is not able to elevate either eye above baseline. I also appreciate a left eye abduction deficit concerning for a sixth nerve palsy on the left  On dilated exam, I do not see any disc edema  Plan:  Exam extremely limited, but given the tonic downgaze and upgaze deficit as well as abduction deficit of the left eye, I am concerned for gradually increased intracranial pressure    MRI Brain/Orbits 10/30/24: MR orbits: Allowing for artifact from motion and postoperative metal there is diffuse small caliber intra orbital and intracanalicular optic nerve concerning for optic nerve atrophy clinical correlation advised     MRI brain: Remote operative change with scattered areas of encephalomalacia cerebral hemispheres similar to prior.  In addition there is encephalomalacia in the left midbrain and in the left cerebellum similar to prior.  No evidence for acute infarction or significant new parenchymal signal abnormality  Stable bilateral parietal and left occipital ventricular catheters without increased sized ventricles to suggest worsening hydrocephalus.    2/3/24: Exam grossly unchanged, maybe slightly better  Unable to obtain formal alignment measurement due to patient participation  Plan:  Recommend observation as strabismus surgical intervention likely not to improve visual functioning  RTC 1 year         Esotropia of left eye    Myopia of both eyes    Current Assessment & Plan     Mom says patient liked wearing glasses but recently lost " script    Plan:  Updated Rx today         Blepharitis of right upper eyelid    Current Assessment & Plan     Mom noting a lot of discharge from OD for one month  Has anterior and post blepharitis OD only    Plan:  Advised on twice daily lid scrubs with baby shampoo  Will do short course of maxitrol - BID for two weeks then stop           Girish Mead MD  Pediatric Ophthalmology and Adult Strabismus  [facility]

## 2025-02-03 NOTE — ASSESSMENT & PLAN NOTE
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  This study is quite limited by motion artifact.  There is good definition of the sulci.  There is no transependymal flow.  Multiple shunt catheters are coursing through on both sides.  Compared to prior imaging in our system, this appears roughly stable to prior scans.  I reviewed multiple prior CTs, going back to 2013.  This shows that none of the catheters are actually connected to a distal catheter.  Thus, the system is all disconnected."    9/23/24: Here for concern for worsening strabismus. Mom's main concern is the left eye looking more down, but she says both eyes have looked increasing downward over the last year.   My exam is extremely limited today given patient cooperation, however, I do see she is not able to elevate either eye above baseline. I also appreciate a left eye abduction deficit concerning for a sixth nerve palsy on the left  On dilated exam, I do not see any disc edema  Plan:  Exam extremely limited, but given the tonic downgaze and upgaze deficit as well as abduction deficit of the left eye, I am concerned for gradually increased intracranial pressure    MRI Brain/Orbits 10/30/24: MR orbits: Allowing for artifact from motion and postoperative metal there is diffuse small caliber intra orbital and intracanalicular optic nerve concerning for optic nerve atrophy clinical correlation advised     MRI brain: Remote operative change with scattered areas of encephalomalacia cerebral hemispheres similar to prior.  In addition there is encephalomalacia in the left midbrain and in the left cerebellum similar to prior.  No evidence for acute " infarction or significant new parenchymal signal abnormality  Stable bilateral parietal and left occipital ventricular catheters without increased sized ventricles to suggest worsening hydrocephalus.    2/3/24: Exam grossly unchanged, maybe slightly better  Unable to obtain formal alignment measurement due to patient participation  Plan:  Recommend observation as strabismus surgical intervention likely not to improve visual functioning  RTC 1 year

## 2025-04-08 ENCOUNTER — TELEPHONE (OUTPATIENT)
Dept: FAMILY MEDICINE | Facility: CLINIC | Age: 30
End: 2025-04-08
Payer: MEDICAID

## 2025-04-08 NOTE — TELEPHONE ENCOUNTER
----- Message from Isabel sent at 4/8/2025 12:38 PM CDT -----  Contact: Vickie worley/[facility] urology  Vickie is asking for a call back to check the status of the order form for the patients incontinent supplies faxed over on 3/31/2025.  Please call Vickie back at [phone] and she is refaxing it over today so please get this filled out and sent back as soon as possible.  Thanks

## 2025-04-08 NOTE — TELEPHONE ENCOUNTER
Spoke with [employer] gave them our fax number in the back [phone]. They faxed the paperwork out, we have it and it is now on Dr. Lara's desk.

## 2025-04-09 ENCOUNTER — TELEPHONE (OUTPATIENT)
Dept: FAMILY MEDICINE | Facility: CLINIC | Age: 30
End: 2025-04-09
Payer: MEDICAID

## 2025-04-09 NOTE — TELEPHONE ENCOUNTER
"Your fax has been successfully sent to [phone] at [phone].  ------------------------------------------------------------  From: [id number]  ------------------------------------------------------------  4/9/2025 6:58:39 AM Transmission Record          Sent to [phone] with remote ID "[id number]      "          Result: (0/339;0/0) Success          Page record: 1 - 5          Elapsed time: 01:57 on channel 37     Incontinence form faxed to [employer] at [phone]  on 4/9/2025  Conformation above    "

## 2025-05-13 DIAGNOSIS — G91.1 OBSTRUCTIVE HYDROCEPHALUS: Primary | ICD-10-CM

## 2025-05-27 ENCOUNTER — OFFICE VISIT (OUTPATIENT)
Dept: FAMILY MEDICINE | Facility: CLINIC | Age: 30
End: 2025-05-27
Payer: MEDICAID

## 2025-05-27 ENCOUNTER — TELEPHONE (OUTPATIENT)
Dept: FAMILY MEDICINE | Facility: CLINIC | Age: 30
End: 2025-05-27

## 2025-05-27 VITALS
SYSTOLIC BLOOD PRESSURE: 155 MMHG | RESPIRATION RATE: 16 BRPM | HEART RATE: 90 BPM | OXYGEN SATURATION: 99 % | DIASTOLIC BLOOD PRESSURE: 77 MMHG | HEIGHT: 60 IN | BODY MASS INDEX: 29.98 KG/M2 | WEIGHT: 152.69 LBS

## 2025-05-27 DIAGNOSIS — K08.89 CHEWING DIFFICULTY: ICD-10-CM

## 2025-05-27 DIAGNOSIS — K50.819 CROHN'S DISEASE OF SMALL AND LARGE INTESTINES WITH COMPLICATION: ICD-10-CM

## 2025-05-27 DIAGNOSIS — R63.5 WEIGHT GAIN: ICD-10-CM

## 2025-05-27 DIAGNOSIS — R26.89 DECREASED FUNCTIONAL MOBILITY: ICD-10-CM

## 2025-05-27 DIAGNOSIS — L22 DIAPER RASH: ICD-10-CM

## 2025-05-27 DIAGNOSIS — R21 SKIN RASH: ICD-10-CM

## 2025-05-27 DIAGNOSIS — R71.8 MICROCYTOSIS: ICD-10-CM

## 2025-05-27 DIAGNOSIS — Z13.220 ENCOUNTER FOR LIPID SCREENING FOR CARDIOVASCULAR DISEASE: ICD-10-CM

## 2025-05-27 DIAGNOSIS — G80.2 SPASTIC HEMIPLEGIC CEREBRAL PALSY: Primary | ICD-10-CM

## 2025-05-27 DIAGNOSIS — M62.838 MUSCLE SPASTICITY: ICD-10-CM

## 2025-05-27 DIAGNOSIS — R23.8 SKIN BREAKDOWN: ICD-10-CM

## 2025-05-27 DIAGNOSIS — Z13.6 ENCOUNTER FOR LIPID SCREENING FOR CARDIOVASCULAR DISEASE: ICD-10-CM

## 2025-05-27 DIAGNOSIS — R21 SKIN RASH: Primary | ICD-10-CM

## 2025-05-27 PROCEDURE — 99215 OFFICE O/P EST HI 40 MIN: CPT | Mod: PBBFAC,PN | Performed by: STUDENT IN AN ORGANIZED HEALTH CARE EDUCATION/TRAINING PROGRAM

## 2025-05-27 PROCEDURE — 99214 OFFICE O/P EST MOD 30 MIN: CPT | Mod: S$PBB,,, | Performed by: STUDENT IN AN ORGANIZED HEALTH CARE EDUCATION/TRAINING PROGRAM

## 2025-05-27 PROCEDURE — 99999 PR PBB SHADOW E&M-EST. PATIENT-LVL V: CPT | Mod: PBBFAC,,, | Performed by: STUDENT IN AN ORGANIZED HEALTH CARE EDUCATION/TRAINING PROGRAM

## 2025-05-27 PROCEDURE — 3078F DIAST BP <80 MM HG: CPT | Mod: CPTII,,, | Performed by: STUDENT IN AN ORGANIZED HEALTH CARE EDUCATION/TRAINING PROGRAM

## 2025-05-27 PROCEDURE — G2211 COMPLEX E/M VISIT ADD ON: HCPCS | Mod: S$PBB,,, | Performed by: STUDENT IN AN ORGANIZED HEALTH CARE EDUCATION/TRAINING PROGRAM

## 2025-05-27 PROCEDURE — 3008F BODY MASS INDEX DOCD: CPT | Mod: CPTII,,, | Performed by: STUDENT IN AN ORGANIZED HEALTH CARE EDUCATION/TRAINING PROGRAM

## 2025-05-27 PROCEDURE — 1159F MED LIST DOCD IN RCRD: CPT | Mod: CPTII,,, | Performed by: STUDENT IN AN ORGANIZED HEALTH CARE EDUCATION/TRAINING PROGRAM

## 2025-05-27 PROCEDURE — 3077F SYST BP >= 140 MM HG: CPT | Mod: CPTII,,, | Performed by: STUDENT IN AN ORGANIZED HEALTH CARE EDUCATION/TRAINING PROGRAM

## 2025-05-27 RX ORDER — CLOTRIMAZOLE 1 %
CREAM (GRAM) TOPICAL 2 TIMES DAILY
Qty: 45 G | Refills: 3 | Status: SHIPPED | OUTPATIENT
Start: 2025-05-27

## 2025-05-27 RX ORDER — UPADACITINIB 45 MG/1
1 TABLET, EXTENDED RELEASE ORAL DAILY
COMMUNITY

## 2025-05-27 NOTE — ASSESSMENT & PLAN NOTE
Could benefit from a hospital bed with an air mattress to prevent ulcerations and rashes due to her mobility.  She needs a jad lift to help with getting her in the tub  She needs a different lift to get her in the car.  She is in need of bath equipment to help with her hygiene adls.  She cannot position herself and she is in need of assistance because her caregiver cannot lift her any longer

## 2025-05-27 NOTE — PROGRESS NOTES
"Subjective:       Patient ID: Santino Pompa is a 30 y.o. female.    Chief Complaint: Cerebral Palsy    History of Present Illness    CHIEF COMPLAINT:  Ms. Pompa presents today for follow up regarding equipment needs and skin concerns.    SKIN CONCERNS:  She reports skin cracking with visible flesh in certain areas and white fine bumps that are drying out on the front of her body. She also notes white, malodorous areas behind her ears. Her caregiver has been applying Vaseline to affected areas with some relief.    FEEDING AND SWALLOWING:  She has difficulty chewing and tends to mash food with her tongue, sometimes swallowing food whole if not ground up. Her caregiver provides ground meat and soft foods like rice and pasta. She is experiencing weight gain, which is concerning due to her limited mobility.    MEDICAL EQUIPMENT:  She requires a hospital bed with air mattress for skin breakdown prevention, a jad lift with car transfer equipment, and a bath chair for bathing assistance. Her caregiver reports increasing difficulty with car transfers due to personal history of two back surgeries, describing the discomfort as "unbearable."       Santino Pompa has a mobility limitation that significantly impairs her ability to participate in one or more mobility related activities of daily living (MRADLS) such as toileting, feeding, dressing, grooming, and bathing in customary locations in the home.  The mobility limitations cannot be sufficiently resolved with the use of a cane or walker.  The use of a wheelchair will significantly improve the patient's ability to participate in MRADLS, and the patient will use it on a regular basis in the home.Her mother has expressed willingness to use a manual wheelchair in the home. She also has a caregiver who is available, willing, and able to provide assistance with a wheelchair when needed. and Santino Pompa requires a hospital bed due to the patient " requiring positioning of the body in ways not feasible with an ordinary bed to alleviate her pain.  The patient is completely immobile or has limited mobility and cannot independently make changes in body position without the use of the hospital bed.  The positioning of the patient's body cannot be sufficiently resolved with the use of pillows and wedges. She is getting skin breakdown from her regular bed and is in need of an air mattress for pressure support.     Santino Pompa requires the head of the hospital bed to be elevated more than 30° most of the time due to risk of aspiration.  The positioning of the body cannot be sufficiently resolved by the use of pillows and wedges.    She could benefit from a lift to get her into the tub and allow for bathing/hygiene as she is unable to position into the tub and her caregiver is not able to safely lift her into the tub.  She is in need of a lift to get her into the car as her caregiver is not able to lift her into the car and Ms De is not able to position herself in the car.         Review of Systems   Unable to perform ROS: Patient nonverbal      Problem List[1]  Santino has a current medication list which includes the following prescription(s): baclofen, xcopri, ciclopirox, ciclopirox, fluticasone propionate, hydrocortisone, lactulose, levetiracetam, mometasone, mometasone 0.1%, mupirocin, neomycin-polymyxin-dexamethasone, nystatin, nystatin, ondansetron, promethazine hcl, rinvoq, triamcinolone acetonide 0.5%, adalimumab, clotrimazole, and zinc oxide.        Health Maintenance Due   Topic Date Due    TETANUS VACCINE  Never done    COVID-19 Vaccine (4 - 2024-25 season) 09/01/2024      Health Maintenance reviewed and discussed- labs ordered.     Objective:      Physical Exam  Constitutional:       General: She is not in acute distress.     Appearance: Normal appearance. She is obese. She is not ill-appearing.      Comments: With mom in room   Eyes:       Conjunctiva/sclera: Conjunctivae normal.      Pupils: Pupils are equal, round, and reactive to light.   Cardiovascular:      Rate and Rhythm: Normal rate and regular rhythm.      Heart sounds: Normal heart sounds. No murmur heard.  Pulmonary:      Effort: Pulmonary effort is normal. No respiratory distress.      Breath sounds: Normal breath sounds. No wheezing, rhonchi or rales.   Musculoskeletal:      Right lower leg: No edema.      Left lower leg: No edema.   Skin:     General: Skin is warm and dry.   Neurological:      Mental Status: She is alert. Mental status is at baseline.      Gait: Gait abnormal (wheelchair use).      Comments: Wheelchair bound from limited mobility, cerebral palsy, spasticity   Psychiatric:         Mood and Affect: Mood normal.         Thought Content: Thought content normal.         Judgment: Judgment normal.      Comments: Mental handicap.  Does not speak but is very pleasant                 Assessment:       Assessment & Plan    1. Assessed skin condition, noting cracking and exposed flesh.  2. Suspect yeast infection behind ears based on white, odorous presentation.  3. Reviewed recent labs; glucose levels normal (74 mg/dL on May 13th), ruling out immediate diabetes concern.  4. Plan to monitor thyroid and sugar levels due to weight changes.           Plan:       1. Spastic hemiplegic cerebral palsy  Assessment & Plan:  Could benefit from a hospital bed with an air mattress to prevent ulcerations and rashes due to her mobility.  She needs a jad lift to help with getting her in the tub  She needs a different lift to get her in the car.  She is in need of bath equipment to help with her hygiene adls.  She cannot position herself and she is in need of assistance because her caregiver cannot lift her any longer    Orders:  -     Ambulatory referral/consult to Home Health; Future; Expected date: 05/28/2025  -     E - OTHER    2. Crohn's disease of small and large intestines with  complication  Assessment & Plan:  Was not doing well on humira  Now on renvoq.        3. Muscle spasticity  Assessment & Plan:  Wheelchair bound and hospital bed bound due to spacticity, limited mobility and need for positioning and pressure relief      4. Decreased functional mobility  -     Ambulatory referral/consult to Home Health; Future; Expected date: 05/28/2025  -     HME - OTHER    5. Skin rash  Comments:  continued drying, clotrimazole cream  Orders:  -     Ambulatory referral/consult to Dermatology; Future; Expected date: 06/03/2025    6. Skin breakdown  Comments:  pressure relief, zinc oxide for barrier.  Orders:  -     zinc oxide 10 % Oint; Twice daily for rash prevention  Dispense: 226.6 g; Refill: 3    7. Diaper rash  Comments:  Finding a compound cream with zinc oxide, nystatin cream in it.   Orders:  -     clotrimazole (LOTRIMIN) 1 % cream; Apply topically 2 (two) times daily.  Dispense: 45 g; Refill: 3    8. Chewing difficulty  Comments:  speech therapy to help with food thickening, chewing and swellowing difficulty  Orders:  -     Ambulatory referral/consult to Home Health; Future; Expected date: 05/28/2025    9. Encounter for lipid screening for cardiovascular disease  -     Lipid Panel; Future; Expected date: 05/27/2025    10. Microcytosis  -     CBC Without Differential; Future; Expected date: 05/27/2025  -     Iron and TIBC; Future; Expected date: 05/27/2025    11. Weight gain  -     Hemoglobin A1C; Future; Expected date: 05/27/2025  -     TSH; Future; Expected date: 05/27/2025       Visit today included increased complexity associated with the care of the episodic problem fungal rash, skin breakdown addressed and managing the longitudinal care of the patient due to the serious and/or complex managed problem(s) cerebral palsy, spasticity.    This note was generated with the assistance of ambient listening technology. Verbal consent was obtained by the patient and accompanying visitor(s) for  the recording of patient appointment to facilitate this note. I attest to having reviewed and edited the generated note for accuracy, though some syntax or spelling errors may persist. Please contact the author of this note for any clarification.                    [1]   Patient Active Problem List  Diagnosis    Hydrocephalus    Stiffness of right elbow joint    Stiffness of joint of right forearm    Stiffness of right wrist joint    Stiffness of right hand joint    Muscle spasticity    Decreased functional mobility    Stiff elbow, right    Stiff wrist, left    Spastic hemiplegic cerebral palsy    Right spastic hemiplegia    Psychophysiologic disorder    Intracranial shunt    Intellectual disability    Incontinence without sensory awareness    Epilepsy    Dental caries    Contracture, right elbow    Continuous leakage of urine    Crohn's disease of small and large intestines with complication    Palsy of conjugate gaze    Esotropia of left eye    Myopia of both eyes    Wheelchair dependence    Blepharitis of right upper eyelid

## 2025-05-28 ENCOUNTER — LAB VISIT (OUTPATIENT)
Dept: LAB | Facility: HOSPITAL | Age: 30
End: 2025-05-28
Attending: STUDENT IN AN ORGANIZED HEALTH CARE EDUCATION/TRAINING PROGRAM
Payer: MEDICAID

## 2025-05-28 DIAGNOSIS — R63.5 WEIGHT GAIN: ICD-10-CM

## 2025-05-28 DIAGNOSIS — R71.8 MICROCYTOSIS: ICD-10-CM

## 2025-05-28 DIAGNOSIS — Z13.6 ENCOUNTER FOR LIPID SCREENING FOR CARDIOVASCULAR DISEASE: ICD-10-CM

## 2025-05-28 DIAGNOSIS — Z13.220 ENCOUNTER FOR LIPID SCREENING FOR CARDIOVASCULAR DISEASE: ICD-10-CM

## 2025-05-28 LAB
CHOLEST SERPL-MCNC: 132 MG/DL (ref 120–199)
CHOLEST/HDLC SERPL: 3.1 {RATIO} (ref 2–5)
EAG (SMH): 103 MG/DL (ref 68–131)
ERYTHROCYTE [DISTWIDTH] IN BLOOD BY AUTOMATED COUNT: 13.2 % (ref 11.5–14.5)
HBA1C MFR BLD: 5.2 % (ref 4–5.6)
HCT VFR BLD AUTO: 42 % (ref 37–48.5)
HDLC SERPL-MCNC: 42 MG/DL (ref 40–75)
HDLC SERPL: 31.8 % (ref 20–50)
HGB BLD-MCNC: 12.5 GM/DL (ref 12–16)
IRON SATN MFR SERPL: 27 % (ref 20–50)
IRON SERPL-MCNC: 83 UG/DL (ref 30–160)
LDLC SERPL CALC-MCNC: 75.6 MG/DL (ref 63–159)
MCH RBC QN AUTO: 25.7 PG (ref 27–31)
MCHC RBC AUTO-ENTMCNC: 29.8 G/DL (ref 32–36)
MCV RBC AUTO: 86 FL (ref 82–98)
NONHDLC SERPL-MCNC: 90 MG/DL
PLATELET # BLD AUTO: 59 K/UL (ref 150–450)
PMV BLD AUTO: 11.2 FL (ref 9.2–12.9)
RBC # BLD AUTO: 4.87 M/UL (ref 4–5.4)
TIBC SERPL-MCNC: 309 UG/DL (ref 250–450)
TRANSFERRIN SERPL-MCNC: 209 MG/DL (ref 200–375)
TRIGL SERPL-MCNC: 72 MG/DL (ref 30–150)
TSH SERPL-ACNC: 0.79 UIU/ML (ref 0.4–4)
WBC # BLD AUTO: 6.19 K/UL (ref 3.9–12.7)

## 2025-05-28 PROCEDURE — 84466 ASSAY OF TRANSFERRIN: CPT

## 2025-05-28 PROCEDURE — 83036 HEMOGLOBIN GLYCOSYLATED A1C: CPT

## 2025-05-28 PROCEDURE — 84443 ASSAY THYROID STIM HORMONE: CPT

## 2025-05-28 PROCEDURE — 36415 COLL VENOUS BLD VENIPUNCTURE: CPT

## 2025-05-28 PROCEDURE — 80061 LIPID PANEL: CPT

## 2025-05-28 PROCEDURE — 85027 COMPLETE CBC AUTOMATED: CPT

## 2025-05-28 NOTE — ASSESSMENT & PLAN NOTE
Wheelchair bound and hospital bed bound due to spacticity, limited mobility and need for positioning and pressure relief

## 2025-05-29 ENCOUNTER — TELEPHONE (OUTPATIENT)
Dept: FAMILY MEDICINE | Facility: CLINIC | Age: 30
End: 2025-05-29
Payer: MEDICAID

## 2025-05-29 DIAGNOSIS — R21 SKIN RASH: Primary | ICD-10-CM

## 2025-05-29 DIAGNOSIS — L22 DIAPER RASH: ICD-10-CM

## 2025-05-29 NOTE — TELEPHONE ENCOUNTER
Copied from CRM #[id number]. Topic: Appointments - Amb Referral  >> May 29, 2025 11:03 AM Arlene wrote:  Type:  Needs Medical Advice    Who Called: Myah from [facility]  Would the patient rather a call back or a response via [employer]? N/A  Best Call Back Number: Myah [phone]  Additional Information: Myah from [facility] called in regards to a referral that was put into them. The referral requested a hospital bed and an electric lift. Unfortunately, they are located in [place] and cannot help with that request in [place]. If you have any questions for Myah please see number listed above.

## 2025-05-29 NOTE — TELEPHONE ENCOUNTER
Reached out pt mother.   [facility] [place] Location    [place]- [phone]  [place]- [phone]    Attempted ot contact [place] to confirm fax #, no answer. LVM

## 2025-05-30 ENCOUNTER — RESULTS FOLLOW-UP (OUTPATIENT)
Dept: FAMILY MEDICINE | Facility: CLINIC | Age: 30
End: 2025-05-30

## 2025-05-30 RX ORDER — ZINC OXIDE 20 G/100G
OINTMENT TOPICAL
Qty: 500 G | Refills: 3 | Status: SHIPPED | OUTPATIENT
Start: 2025-05-30

## 2025-06-17 ENCOUNTER — TELEPHONE (OUTPATIENT)
Dept: FAMILY MEDICINE | Facility: CLINIC | Age: 30
End: 2025-06-17
Payer: MEDICAID

## 2025-06-17 NOTE — TELEPHONE ENCOUNTER
Copied from CRM #[id number]. Topic: General Inquiry - Return Call  >> Jun 17, 2025 11:17 AM Roxanne wrote:  Please call Lesley at [facility]  at [phone]. She faxed over paperwork for pt's mobility unit. She's checking on the status. Please call. Thank you.

## 2025-06-17 NOTE — TELEPHONE ENCOUNTER
6/10/25 encounter  Returned call to [facility]   Medicaid Provider ID # [id number] provided for MD  Medicaid certification of medical necessity Order will be faxed to   [phone]   Please be on look out for fax        Dr. Lara, did you receive this?

## 2025-06-23 ENCOUNTER — TELEPHONE (OUTPATIENT)
Dept: FAMILY MEDICINE | Facility: CLINIC | Age: 30
End: 2025-06-23

## 2025-06-23 ENCOUNTER — OFFICE VISIT (OUTPATIENT)
Dept: FAMILY MEDICINE | Facility: CLINIC | Age: 30
End: 2025-06-23
Payer: MEDICAID

## 2025-06-23 ENCOUNTER — TELEPHONE (OUTPATIENT)
Dept: FAMILY MEDICINE | Facility: CLINIC | Age: 30
End: 2025-06-23
Payer: MEDICAID

## 2025-06-23 VITALS — SYSTOLIC BLOOD PRESSURE: 112 MMHG | HEART RATE: 71 BPM | OXYGEN SATURATION: 98 % | DIASTOLIC BLOOD PRESSURE: 68 MMHG

## 2025-06-23 DIAGNOSIS — J20.9 ACUTE BRONCHITIS, UNSPECIFIED ORGANISM: ICD-10-CM

## 2025-06-23 DIAGNOSIS — J84.9 INTERSTITIAL LUNG DISEASE: ICD-10-CM

## 2025-06-23 DIAGNOSIS — R06.89 INEFFECTIVE AIRWAY CLEARANCE: ICD-10-CM

## 2025-06-23 DIAGNOSIS — R09.89 CHEST CONGESTION: Primary | ICD-10-CM

## 2025-06-23 PROCEDURE — 99213 OFFICE O/P EST LOW 20 MIN: CPT | Mod: PBBFAC,PN | Performed by: NURSE PRACTITIONER

## 2025-06-23 PROCEDURE — 1159F MED LIST DOCD IN RCRD: CPT | Mod: CPTII,,, | Performed by: NURSE PRACTITIONER

## 2025-06-23 PROCEDURE — 3074F SYST BP LT 130 MM HG: CPT | Mod: CPTII,,, | Performed by: NURSE PRACTITIONER

## 2025-06-23 PROCEDURE — 99214 OFFICE O/P EST MOD 30 MIN: CPT | Mod: S$PBB,,, | Performed by: NURSE PRACTITIONER

## 2025-06-23 PROCEDURE — 3044F HG A1C LEVEL LT 7.0%: CPT | Mod: CPTII,,, | Performed by: NURSE PRACTITIONER

## 2025-06-23 PROCEDURE — 3078F DIAST BP <80 MM HG: CPT | Mod: CPTII,,, | Performed by: NURSE PRACTITIONER

## 2025-06-23 PROCEDURE — 99999 PR PBB SHADOW E&M-EST. PATIENT-LVL III: CPT | Mod: PBBFAC,,, | Performed by: NURSE PRACTITIONER

## 2025-06-23 RX ORDER — IPRATROPIUM BROMIDE AND ALBUTEROL SULFATE 2.5; .5 MG/3ML; MG/3ML
3 SOLUTION RESPIRATORY (INHALATION) EVERY 6 HOURS PRN
Qty: 75 ML | Refills: 0 | Status: SHIPPED | OUTPATIENT
Start: 2025-06-23 | End: 2026-06-23

## 2025-06-23 RX ORDER — AZITHROMYCIN 250 MG/1
TABLET, FILM COATED ORAL
Qty: 6 TABLET | Refills: 0 | Status: SHIPPED | OUTPATIENT
Start: 2025-06-23 | End: 2025-06-28

## 2025-06-23 NOTE — TELEPHONE ENCOUNTER
Received paperwork Dr. Lara signed I faxed it to [phone] and [phone]   Also made a copy for the patient to    Called the patient, spoke with her mother ( Francisca ) she verbally understood and thanked us.   Victoria Rene CMA 06/23/2025 11:03 AM

## 2025-06-23 NOTE — PROGRESS NOTES
To Subjective:       Patient ID: Santino Pompa is a 30 y.o. female.    Chief Complaint: Nasal Congestion (Pt is here for nasal,chest congestion x Thursday, treating with vitamin c, d and zinc, liquid hydration,mucinex,nyquil) and Chest Congestion    Santino Pompa presents to the clinic with her mother for chest congestion, sinus congestion, productive cough  Established patient within the clinic, new patient to myself    Under the care of the following  PCP: Dr. Lara   Gastroenterology: Dr. KALEN Coy  Neurology: Dr. STEFFEN Zaragoza  Patient Active Problem List  as of 6/23/2025 10:00 AM  Diagnosis  · Hydrocephalus  · Stiffness of right elbow joint  · Stiffness of joint of right forearm  · Stiffness of right wrist joint  · Stiffness of right hand joint  · Muscle spasticity  · Decreased functional mobility  · Stiff elbow, right  · Stiff wrist, left  · Spastic hemiplegic cerebral palsy  · Right spastic hemiplegia  · Psychophysiologic disorder  · Intracranial shunt  · Intellectual disability  · Incontinence without sensory awareness  · Epilepsy  · Dental caries  · Contracture, right elbow  · Continuous leakage of urine  · Crohn's disease of small and large intestines with complication  · Palsy of conjugate gaze  · Esotropia of left eye  · Myopia of both eyes  · Wheelchair dependence  · Blepharitis of right upper eyelid        Nasal Congestion/ chest congestion  Began last Thursday  Mother reports it has been passing throughout the home  Has been treating with vitamin c, d and zinc, liquid hydration,mucinex,nyquil) and Chest Congestion  Patient is not able to blow nose, coughing up mucus, but swallows versus spitting this out          Review of Systems    Problem List[1]    Objective:      Physical Exam  Constitutional:       General: She is not in acute distress.     Appearance: Normal appearance. She is obese. She is not ill-appearing.      Comments: With mom in room   HENT:      Right Ear: Tympanic membrane  normal.      Left Ear: Tympanic membrane normal.      Nose: Mucosal edema and congestion present.      Mouth/Throat:      Lips: Pink.      Pharynx: Oropharynx is clear. Uvula midline. Postnasal drip present. No posterior oropharyngeal erythema.      Tonsils: 1+ on the right. 1+ on the left.      Comments: Tonsil stones bilaterally   Eyes:      Conjunctiva/sclera: Conjunctivae normal.      Pupils: Pupils are equal, round, and reactive to light.   Cardiovascular:      Rate and Rhythm: Normal rate and regular rhythm.      Heart sounds: Normal heart sounds. No murmur heard.  Pulmonary:      Effort: Pulmonary effort is normal. No respiratory distress.      Breath sounds: Examination of the right-upper field reveals wheezing. Examination of the left-upper field reveals wheezing. Wheezing present. No rhonchi or rales.   Musculoskeletal:      Right lower leg: No edema.      Left lower leg: No edema.   Lymphadenopathy:      Cervical: No cervical adenopathy.   Skin:     General: Skin is warm and dry.   Neurological:      Mental Status: She is alert. Mental status is at baseline.      Gait: Gait abnormal (wheelchair use).      Comments: Wheelchair bound from limited mobility, cerebral palsy, spasticity   Psychiatric:         Mood and Affect: Mood normal.         Thought Content: Thought content normal.         Judgment: Judgment normal.      Comments: Mental handicap.  Does not speak but is very pleasant         Lab Results   Component Value Date    WBC 6.19 05/28/2025    HGB 12.5 05/28/2025    HCT 42.0 05/28/2025    PLT 59 (L) 05/28/2025    CHOL 132 05/28/2025    TRIG 72 05/28/2025    HDL 42 05/28/2025    ALT 18 05/18/2022    AST 18 05/18/2022     05/18/2022    K 3.6 05/18/2022     05/18/2022    CREATININE 0.7 05/18/2022    BUN 9 05/18/2022    CO2 26 05/18/2022    TSH 0.786 05/28/2025    INR 1.02 09/25/2012    HGBA1C 5.2 05/28/2025     The ASCVD Risk score (Brandie RICE, et al., 2019) failed to calculate for the  following reasons:    The 2019 ASCVD risk score is only valid for ages 40 to 79  Visit Vitals  /68 (BP Location: Left arm, Patient Position: Sitting)   Pulse 71   SpO2 98%      Assessment:       1. Chest congestion    2. Acute bronchitis, unspecified organism    3. Interstitial lung disease    4. Ineffective airway clearance        Plan:       1. Chest congestion  -     albuterol-ipratropium (DUO-NEB) 2.5 mg-0.5 mg/3 mL nebulizer solution; Take 3 mLs by nebulization every 6 (six) hours as needed for Wheezing. Rescue  Dispense: 75 mL; Refill: 0  -     NEBULIZER FOR HOME USE  -     NEBULIZER KIT (SUPPLIES) FOR HOME USE    2. Acute bronchitis, unspecified organism  -     azithromycin (Z-TAMI) 250 MG tablet; Take 2 tablets by mouth on day 1; Take 1 tablet by mouth on days 2-5  Dispense: 6 tablet; Refill: 0  -     NEBULIZER FOR HOME USE  -     NEBULIZER KIT (SUPPLIES) FOR HOME USE  Take medication as prescribed until all gone  Continue with fluids, mucinex, otc cough medication  [facility]   Chest percussion after nebulizer tx demonstrated/discussed.   3. Interstitial lung disease  -     albuterol-ipratropium (DUO-NEB) 2.5 mg-0.5 mg/3 mL nebulizer solution; Take 3 mLs by nebulization every 6 (six) hours as needed for Wheezing. Rescue  Dispense: 75 mL; Refill: 0  -     NEBULIZER FOR HOME USE  -     NEBULIZER KIT (SUPPLIES) FOR HOME USE    4. Ineffective airway clearance  -     albuterol-ipratropium (DUO-NEB) 2.5 mg-0.5 mg/3 mL nebulizer solution; Take 3 mLs by nebulization every 6 (six) hours as needed for Wheezing. Rescue  Dispense: 75 mL; Refill: 0  -     NEBULIZER FOR HOME USE  -     NEBULIZER KIT (SUPPLIES) FOR HOME USE       No follow-ups on file.      Future Appointments       Date Provider Specialty Appt Notes    8/27/2025 Alysha Lara MD Family Medicine  Arrive at: [employer] f/u    5/13/2026  Radiology per [place]                  [1]   Patient  Active Problem List  Diagnosis    Hydrocephalus    Stiffness of right elbow joint    Stiffness of joint of right forearm    Stiffness of right wrist joint    Stiffness of right hand joint    Muscle spasticity    Decreased functional mobility    Stiff elbow, right    Stiff wrist, left    Spastic hemiplegic cerebral palsy    Right spastic hemiplegia    Psychophysiologic disorder    Intracranial shunt    Intellectual disability    Incontinence without sensory awareness    Epilepsy    Dental caries    Contracture, right elbow    Continuous leakage of urine    Crohn's disease of small and large intestines with complication    Palsy of conjugate gaze    Esotropia of left eye    Myopia of both eyes    Wheelchair dependence    Blepharitis of right upper eyelid

## 2025-08-04 ENCOUNTER — TELEPHONE (OUTPATIENT)
Dept: FAMILY MEDICINE | Facility: CLINIC | Age: 30
End: 2025-08-04
Payer: MEDICAID

## 2025-08-04 NOTE — TELEPHONE ENCOUNTER
Spoke with Ms. Alanis w/ Medicaid waiver  re certification needed, Paperwork faxed to office 7/2/25 and hand delivered 8/4/25 to office. Sarah Alanis once completed paperwork will be faxed back.

## 2025-08-04 NOTE — TELEPHONE ENCOUNTER
Copied from CRM #[id number]. Topic: General Inquiry - Patient Advice  >> Aug 4, 2025  3:21 PM Yessica Brooks wrote:  Type:  Needs Medical Advice    Who Called: pt's  -Kelly Alanis -[facility]    Regarding (please be specific): Paperwork submitted 7/2 & 8/4    Would the patient rather a call back or a response via [employer]? Call back    Best Call Back Number: [phone]    Additional Information: Ms. Alanis checking status of ppwk to be completed by Dr. Lara; call back to confirm when she can pick it up.

## 2025-08-06 ENCOUNTER — TELEPHONE (OUTPATIENT)
Dept: FAMILY MEDICINE | Facility: CLINIC | Age: 30
End: 2025-08-06
Payer: MEDICAID

## 2025-08-06 NOTE — TELEPHONE ENCOUNTER
In-Home nursing respite physicians recommendation form is filled out and ready for pick at .  LVM for Patient's mother

## 2025-08-14 ENCOUNTER — DOCUMENT SCAN (OUTPATIENT)
Dept: HOME HEALTH SERVICES | Facility: HOSPITAL | Age: 30
End: 2025-08-14
Payer: MEDICAID

## 2025-08-27 ENCOUNTER — OFFICE VISIT (OUTPATIENT)
Dept: FAMILY MEDICINE | Facility: CLINIC | Age: 30
End: 2025-08-27
Payer: MEDICAID

## 2025-08-27 VITALS
WEIGHT: 152 LBS | HEART RATE: 67 BPM | OXYGEN SATURATION: 99 % | DIASTOLIC BLOOD PRESSURE: 78 MMHG | HEIGHT: 60 IN | RESPIRATION RATE: 16 BRPM | SYSTOLIC BLOOD PRESSURE: 124 MMHG | BODY MASS INDEX: 29.84 KG/M2

## 2025-08-27 DIAGNOSIS — L30.9 DERMATITIS: ICD-10-CM

## 2025-08-27 DIAGNOSIS — R13.10 DYSPHAGIA, UNSPECIFIED TYPE: Primary | ICD-10-CM

## 2025-08-27 DIAGNOSIS — J20.9 ACUTE BRONCHITIS, UNSPECIFIED ORGANISM: ICD-10-CM

## 2025-08-27 PROCEDURE — 3044F HG A1C LEVEL LT 7.0%: CPT | Mod: CPTII,,, | Performed by: STUDENT IN AN ORGANIZED HEALTH CARE EDUCATION/TRAINING PROGRAM

## 2025-08-27 PROCEDURE — 3074F SYST BP LT 130 MM HG: CPT | Mod: CPTII,,, | Performed by: STUDENT IN AN ORGANIZED HEALTH CARE EDUCATION/TRAINING PROGRAM

## 2025-08-27 PROCEDURE — 3008F BODY MASS INDEX DOCD: CPT | Mod: CPTII,,, | Performed by: STUDENT IN AN ORGANIZED HEALTH CARE EDUCATION/TRAINING PROGRAM

## 2025-08-27 PROCEDURE — 99215 OFFICE O/P EST HI 40 MIN: CPT | Mod: PBBFAC,PN | Performed by: STUDENT IN AN ORGANIZED HEALTH CARE EDUCATION/TRAINING PROGRAM

## 2025-08-27 PROCEDURE — 99214 OFFICE O/P EST MOD 30 MIN: CPT | Mod: S$PBB,,, | Performed by: STUDENT IN AN ORGANIZED HEALTH CARE EDUCATION/TRAINING PROGRAM

## 2025-08-27 PROCEDURE — 99999 PR PBB SHADOW E&M-EST. PATIENT-LVL V: CPT | Mod: PBBFAC,,, | Performed by: STUDENT IN AN ORGANIZED HEALTH CARE EDUCATION/TRAINING PROGRAM

## 2025-08-27 PROCEDURE — 3078F DIAST BP <80 MM HG: CPT | Mod: CPTII,,, | Performed by: STUDENT IN AN ORGANIZED HEALTH CARE EDUCATION/TRAINING PROGRAM

## 2025-08-27 PROCEDURE — 1159F MED LIST DOCD IN RCRD: CPT | Mod: CPTII,,, | Performed by: STUDENT IN AN ORGANIZED HEALTH CARE EDUCATION/TRAINING PROGRAM

## 2025-08-27 RX ORDER — NYSTATIN 100000 U/G
CREAM TOPICAL 2 TIMES DAILY
Qty: 30 G | Refills: 5 | Status: SHIPPED | OUTPATIENT
Start: 2025-08-27

## 2025-08-27 RX ORDER — PROMETHAZINE HYDROCHLORIDE AND DEXTROMETHORPHAN HYDROBROMIDE 6.25; 15 MG/5ML; MG/5ML
5 SYRUP ORAL EVERY 6 HOURS PRN
Qty: 118 ML | Refills: 0 | Status: SHIPPED | OUTPATIENT
Start: 2025-08-27 | End: 2025-09-06

## 2025-08-29 ENCOUNTER — TELEPHONE (OUTPATIENT)
Dept: FAMILY MEDICINE | Facility: CLINIC | Age: 30
End: 2025-08-29
Payer: MEDICAID

## 2025-09-04 ENCOUNTER — CLINICAL SUPPORT (OUTPATIENT)
Dept: REHABILITATION | Facility: HOSPITAL | Age: 30
End: 2025-09-04
Payer: MEDICAID

## 2025-09-04 DIAGNOSIS — R13.11 ORAL PHASE DYSPHAGIA: Primary | ICD-10-CM

## 2025-09-04 PROCEDURE — 92610 EVALUATE SWALLOWING FUNCTION: CPT | Mod: PO

## 2025-09-04 PROCEDURE — 92526 ORAL FUNCTION THERAPY: CPT | Mod: PO
